# Patient Record
Sex: FEMALE | Race: OTHER
[De-identification: names, ages, dates, MRNs, and addresses within clinical notes are randomized per-mention and may not be internally consistent; named-entity substitution may affect disease eponyms.]

---

## 2017-01-12 ENCOUNTER — APPOINTMENT (OUTPATIENT)
Dept: GYNECOLOGIC ONCOLOGY | Facility: CLINIC | Age: 52
End: 2017-01-12

## 2017-07-05 ENCOUNTER — APPOINTMENT (OUTPATIENT)
Dept: GYNECOLOGIC ONCOLOGY | Facility: CLINIC | Age: 52
End: 2017-07-05

## 2017-07-05 VITALS
HEIGHT: 57 IN | BODY MASS INDEX: 29.12 KG/M2 | DIASTOLIC BLOOD PRESSURE: 70 MMHG | WEIGHT: 135 LBS | SYSTOLIC BLOOD PRESSURE: 120 MMHG

## 2017-07-06 LAB — HPV HIGH+LOW RISK DNA PNL CVX: NEGATIVE

## 2017-07-10 LAB
CORE LAB BIOPSY: NORMAL
CYTOLOGY CVX/VAG DOC THIN PREP: NORMAL

## 2017-07-12 ENCOUNTER — APPOINTMENT (OUTPATIENT)
Dept: NUCLEAR MEDICINE | Facility: IMAGING CENTER | Age: 52
End: 2017-07-12

## 2017-07-25 ENCOUNTER — OTHER (OUTPATIENT)
Age: 52
End: 2017-07-25

## 2017-08-09 ENCOUNTER — APPOINTMENT (OUTPATIENT)
Dept: CT IMAGING | Facility: IMAGING CENTER | Age: 52
End: 2017-08-09
Payer: MEDICAID

## 2017-08-09 ENCOUNTER — OUTPATIENT (OUTPATIENT)
Dept: OUTPATIENT SERVICES | Facility: HOSPITAL | Age: 52
LOS: 1 days | End: 2017-08-09
Payer: MEDICAID

## 2017-08-09 DIAGNOSIS — N93.9 ABNORMAL UTERINE AND VAGINAL BLEEDING, UNSPECIFIED: ICD-10-CM

## 2017-08-09 DIAGNOSIS — C53.9 MALIGNANT NEOPLASM OF CERVIX UTERI, UNSPECIFIED: ICD-10-CM

## 2017-08-09 PROCEDURE — 71260 CT THORAX DX C+: CPT | Mod: 26

## 2017-08-09 PROCEDURE — 74177 CT ABD & PELVIS W/CONTRAST: CPT

## 2017-08-09 PROCEDURE — 74177 CT ABD & PELVIS W/CONTRAST: CPT | Mod: 26

## 2017-08-09 PROCEDURE — 71260 CT THORAX DX C+: CPT

## 2017-12-21 ENCOUNTER — APPOINTMENT (OUTPATIENT)
Dept: RADIATION ONCOLOGY | Facility: CLINIC | Age: 52
End: 2017-12-21

## 2018-03-08 ENCOUNTER — APPOINTMENT (OUTPATIENT)
Dept: GYNECOLOGIC ONCOLOGY | Facility: CLINIC | Age: 53
End: 2018-03-08
Payer: COMMERCIAL

## 2018-03-08 VITALS
SYSTOLIC BLOOD PRESSURE: 120 MMHG | HEIGHT: 57 IN | BODY MASS INDEX: 30.85 KG/M2 | DIASTOLIC BLOOD PRESSURE: 80 MMHG | WEIGHT: 143 LBS

## 2018-03-08 DIAGNOSIS — G89.3 NEOPLASM RELATED PAIN (ACUTE) (CHRONIC): ICD-10-CM

## 2018-03-08 DIAGNOSIS — Z87.898 PERSONAL HISTORY OF OTHER SPECIFIED CONDITIONS: ICD-10-CM

## 2018-03-08 DIAGNOSIS — N93.9 ABNORMAL UTERINE AND VAGINAL BLEEDING, UNSPECIFIED: ICD-10-CM

## 2018-03-08 DIAGNOSIS — N88.8 OTHER SPECIFIED NONINFLAMMATORY DISORDERS OF CERVIX UTERI: ICD-10-CM

## 2018-03-08 PROCEDURE — 99213 OFFICE O/P EST LOW 20 MIN: CPT

## 2018-03-09 LAB — HPV HIGH+LOW RISK DNA PNL CVX: NOT DETECTED

## 2018-03-16 LAB — CYTOLOGY CVX/VAG DOC THIN PREP: NORMAL

## 2018-03-20 ENCOUNTER — CHART COPY (OUTPATIENT)
Age: 53
End: 2018-03-20

## 2018-10-11 ENCOUNTER — APPOINTMENT (OUTPATIENT)
Dept: GYNECOLOGIC ONCOLOGY | Facility: CLINIC | Age: 53
End: 2018-10-11

## 2018-12-20 ENCOUNTER — APPOINTMENT (OUTPATIENT)
Dept: GYNECOLOGIC ONCOLOGY | Facility: CLINIC | Age: 53
End: 2018-12-20
Payer: COMMERCIAL

## 2018-12-20 ENCOUNTER — OTHER (OUTPATIENT)
Age: 53
End: 2018-12-20

## 2018-12-20 VITALS
HEIGHT: 57 IN | WEIGHT: 140 LBS | SYSTOLIC BLOOD PRESSURE: 124 MMHG | BODY MASS INDEX: 30.2 KG/M2 | DIASTOLIC BLOOD PRESSURE: 80 MMHG

## 2018-12-20 PROCEDURE — 57100 BIOPSY VAGINAL MUCOSA SIMPLE: CPT

## 2018-12-20 PROCEDURE — 99213 OFFICE O/P EST LOW 20 MIN: CPT | Mod: 25

## 2018-12-21 LAB — HPV HIGH+LOW RISK DNA PNL CVX: NOT DETECTED

## 2018-12-27 ENCOUNTER — OTHER (OUTPATIENT)
Age: 53
End: 2018-12-27

## 2019-01-02 LAB
CORE LAB BIOPSY: NORMAL
CYTOLOGY CVX/VAG DOC THIN PREP: NORMAL

## 2019-01-23 ENCOUNTER — OUTPATIENT (OUTPATIENT)
Dept: OUTPATIENT SERVICES | Facility: HOSPITAL | Age: 54
LOS: 1 days | End: 2019-01-23
Payer: MEDICAID

## 2019-01-23 ENCOUNTER — APPOINTMENT (OUTPATIENT)
Dept: NUCLEAR MEDICINE | Facility: IMAGING CENTER | Age: 54
End: 2019-01-23
Payer: COMMERCIAL

## 2019-01-23 DIAGNOSIS — C53.9 MALIGNANT NEOPLASM OF CERVIX UTERI, UNSPECIFIED: ICD-10-CM

## 2019-01-23 PROCEDURE — 78815 PET IMAGE W/CT SKULL-THIGH: CPT | Mod: 26,PS

## 2019-01-23 PROCEDURE — 78815 PET IMAGE W/CT SKULL-THIGH: CPT

## 2019-01-23 PROCEDURE — A9552: CPT

## 2019-02-06 ENCOUNTER — APPOINTMENT (OUTPATIENT)
Dept: ULTRASOUND IMAGING | Facility: IMAGING CENTER | Age: 54
End: 2019-02-06
Payer: COMMERCIAL

## 2019-02-06 ENCOUNTER — OUTPATIENT (OUTPATIENT)
Dept: OUTPATIENT SERVICES | Facility: HOSPITAL | Age: 54
LOS: 1 days | End: 2019-02-06
Payer: COMMERCIAL

## 2019-02-06 ENCOUNTER — RESULT REVIEW (OUTPATIENT)
Age: 54
End: 2019-02-06

## 2019-02-06 DIAGNOSIS — R59.0 LOCALIZED ENLARGED LYMPH NODES: ICD-10-CM

## 2019-02-06 DIAGNOSIS — Z00.8 ENCOUNTER FOR OTHER GENERAL EXAMINATION: ICD-10-CM

## 2019-02-06 PROCEDURE — 88305 TISSUE EXAM BY PATHOLOGIST: CPT

## 2019-02-06 PROCEDURE — 88172 CYTP DX EVAL FNA 1ST EA SITE: CPT

## 2019-02-06 PROCEDURE — 10005 FNA BX W/US GDN 1ST LES: CPT

## 2019-02-06 PROCEDURE — 88173 CYTOPATH EVAL FNA REPORT: CPT

## 2019-02-06 PROCEDURE — 88305 TISSUE EXAM BY PATHOLOGIST: CPT | Mod: 26

## 2019-02-06 PROCEDURE — 88173 CYTOPATH EVAL FNA REPORT: CPT | Mod: 26

## 2019-02-11 LAB — NON-GYNECOLOGICAL CYTOLOGY STUDY: SIGNIFICANT CHANGE UP

## 2019-03-21 ENCOUNTER — EMERGENCY (EMERGENCY)
Facility: HOSPITAL | Age: 54
LOS: 1 days | Discharge: HOME | End: 2019-03-21
Attending: EMERGENCY MEDICINE
Payer: COMMERCIAL

## 2019-03-21 VITALS
HEIGHT: 59 IN | WEIGHT: 134.92 LBS | HEART RATE: 74 BPM | SYSTOLIC BLOOD PRESSURE: 158 MMHG | DIASTOLIC BLOOD PRESSURE: 87 MMHG | TEMPERATURE: 98 F | RESPIRATION RATE: 16 BRPM | OXYGEN SATURATION: 97 %

## 2019-03-21 LAB
APPEARANCE UR: CLEAR — SIGNIFICANT CHANGE UP
BILIRUB UR-MCNC: NEGATIVE — SIGNIFICANT CHANGE UP
COLOR SPEC: ABNORMAL
DIFF PNL FLD: ABNORMAL
GLUCOSE UR QL: NEGATIVE — SIGNIFICANT CHANGE UP
KETONES UR-MCNC: ABNORMAL
LEUKOCYTE ESTERASE UR-ACNC: ABNORMAL
NITRITE UR-MCNC: NEGATIVE — SIGNIFICANT CHANGE UP
PH UR: 7 — SIGNIFICANT CHANGE UP (ref 5–8)
PROT UR-MCNC: 500 MG/DL
SP GR SPEC: 1.01 — SIGNIFICANT CHANGE UP (ref 1.01–1.02)
UROBILINOGEN FLD QL: NEGATIVE — SIGNIFICANT CHANGE UP

## 2019-03-21 PROCEDURE — 87086 URINE CULTURE/COLONY COUNT: CPT

## 2019-03-21 PROCEDURE — 99283 EMERGENCY DEPT VISIT LOW MDM: CPT | Mod: 25

## 2019-03-21 PROCEDURE — 81001 URINALYSIS AUTO W/SCOPE: CPT

## 2019-03-21 PROCEDURE — 99283 EMERGENCY DEPT VISIT LOW MDM: CPT

## 2019-03-21 NOTE — ED ADULT NURSE NOTE - OBJECTIVE STATEMENT
pt is here for bloody urine.  pt stated that bloody urine for one month, on and off, denied pain or sob, denied N/V/D, pt calm at this time.

## 2019-03-21 NOTE — ED ADULT NURSE NOTE - NSIMPLEMENTINTERV_GEN_ALL_ED
Implemented All Universal Safety Interventions:  [place] to call system. Call bell, personal items and telephone within reach. Instruct patient to call for assistance. Room bathroom lighting operational. Non-slip footwear when patient is off stretcher. Physically safe environment: no spills, clutter or unnecessary equipment. Stretcher in lowest position, wheels locked, appropriate side rails in place.

## 2019-03-21 NOTE — ED PROVIDER NOTE - CLINICAL SUMMARY MEDICAL DECISION MAKING FREE TEXT BOX
53 year old female with intermittent hematuria. vitals WNL. PE as above.  ua with +blood but no UTI. will dc home with urology f/u- advised she will likely need cystoscopy for r/o mass/polyp/etc and to determine cause of hematuria. also possibly radiation cystitis? return precautions given.

## 2019-03-21 NOTE — ED PROVIDER NOTE - TEMPLATE, MLM
Patient cancelled/no showed appointment on 1/29/19 at 11am due to patient's mom states that the patient does not need appointment.      This appointment was: . Dr. Solis, would you like to cancel or no show this appointment? Appointment is pending.    Message routed as Routine priority   Message routed to the Provider and the PSR Pool for the site Provider is working at today    Close message on routing unless Provider input is needed       General

## 2019-03-22 ENCOUNTER — APPOINTMENT (OUTPATIENT)
Dept: RADIATION ONCOLOGY | Facility: CLINIC | Age: 54
End: 2019-03-22

## 2019-03-22 ENCOUNTER — APPOINTMENT (OUTPATIENT)
Dept: UROLOGY | Facility: CLINIC | Age: 54
End: 2019-03-22
Payer: COMMERCIAL

## 2019-03-22 VITALS
OXYGEN SATURATION: 98 % | DIASTOLIC BLOOD PRESSURE: 79 MMHG | HEART RATE: 77 BPM | SYSTOLIC BLOOD PRESSURE: 120 MMHG | TEMPERATURE: 98.4 F

## 2019-03-22 PROCEDURE — 99204 OFFICE O/P NEW MOD 45 MIN: CPT

## 2019-03-22 NOTE — REVIEW OF SYSTEMS
[Painful [place]] : painful [place] [Pain during urination] : pain during urination [Blood in urine that you can see] : blood visible in urine [Discharge from urine canal] : discharge from urine canal [Strong urge to urinate] : strong urge to urinate [Leakage of urine with urgency] : leakage of urine with urgency [Unaware of when urine is leaking] : unaware of when urine is leaking [Negative] : Heme/Lymph

## 2019-03-23 LAB
CULTURE RESULTS: NO GROWTH — SIGNIFICANT CHANGE UP
SPECIMEN SOURCE: SIGNIFICANT CHANGE UP

## 2019-03-24 NOTE — HISTORY OF PRESENT ILLNESS
[FreeTextEntry1] : 52 yo Citizen of [place] speaking F with history of cervical cancer s/p chemoradiation\par Has been having gross hematuria intermittently since January\par Yesterday had gross hematuria with clots and dysuria\par Went to ER and was told to follow-up with urology\par Urine was clear today\par No fever, chills\par Pt also pending follow-up with rad onc later today\par Of note, pt had cysto in 2014 which showed evidence of radiation cystitis\par

## 2019-03-24 NOTE — ASSESSMENT
[FreeTextEntry1] : 54 yo F with gross hematuria\par \par - Reviewed PET CT done in January. Unfortunately, no urogram\par - CT urogram\par - UA, culture, cytology\par - Schedule cystoscopy for Mon\par - Start Bactrim today in case UTI and in anticipation of cysto on Mon

## 2019-03-24 NOTE — PHYSICAL EXAM

## 2019-03-25 ENCOUNTER — APPOINTMENT (OUTPATIENT)
Age: 54
End: 2019-03-25
Payer: COMMERCIAL

## 2019-03-25 PROBLEM — C53.9 MALIGNANT NEOPLASM OF CERVIX UTERI, UNSPECIFIED: Chronic | Status: ACTIVE | Noted: 2019-03-21

## 2019-03-25 LAB
APPEARANCE: CLEAR
BACTERIA UR CULT: NORMAL
BACTERIA: NEGATIVE
BILIRUBIN URINE: NEGATIVE
BLOOD URINE: ABNORMAL
COLOR: YELLOW
GLUCOSE QUALITATIVE U: NEGATIVE
HYALINE CASTS: 0 /LPF
KETONES URINE: NEGATIVE
LEUKOCYTE ESTERASE URINE: NEGATIVE
MICROSCOPIC-UA: NORMAL
NITRITE URINE: NEGATIVE
PH URINE: 6
PROTEIN URINE: NORMAL
RED BLOOD CELLS URINE: 3 /HPF
SPECIFIC GRAVITY URINE: 1.02
SQUAMOUS EPITHELIAL CELLS: 0 /HPF
URINE CYTOLOGY: NORMAL
UROBILINOGEN URINE: NORMAL
WHITE BLOOD CELLS URINE: 2 /HPF

## 2019-03-25 PROCEDURE — 52000 CYSTOURETHROSCOPY: CPT

## 2019-03-25 NOTE — HISTORY OF PRESENT ILLNESS
[FreeTextEntry1] : Shawanda Marrero is a 53 year old female with stage IIB cervical SCC s/p concurrent chemotherapy and EBRT/vaginal brachytherapy  completed 12/2013. She was on the OUtback trial and randomized to received additional systemic cytotoxic chemotherapy, completing March 2014. She then underwent TANIA/BSO in [facility] early 2015. She has continued to follow up with med onc and gyn onc. Was last seen in our office in April 2015.\par \par Recent imaging on 1/23/19 PET/CT noted a left retroclavicular LN unchanged in size and demonstrating FDG activity (SUV 3.7), biopsy recommended and completed on 2/6/19. Pathology was negative.

## 2019-03-27 ENCOUNTER — OUTPATIENT (OUTPATIENT)
Dept: OUTPATIENT SERVICES | Facility: HOSPITAL | Age: 54
LOS: 1 days | End: 2019-03-27
Payer: COMMERCIAL

## 2019-03-27 ENCOUNTER — APPOINTMENT (OUTPATIENT)
Dept: CT IMAGING | Facility: CLINIC | Age: 54
End: 2019-03-27
Payer: COMMERCIAL

## 2019-03-27 DIAGNOSIS — Z00.8 ENCOUNTER FOR OTHER GENERAL EXAMINATION: ICD-10-CM

## 2019-03-27 PROCEDURE — 74178 CT ABD&PLV WO CNTR FLWD CNTR: CPT

## 2019-03-27 PROCEDURE — 74178 CT ABD&PLV WO CNTR FLWD CNTR: CPT | Mod: 26

## 2019-04-25 ENCOUNTER — APPOINTMENT (OUTPATIENT)
Age: 54
End: 2019-04-25
Payer: COMMERCIAL

## 2019-04-25 VITALS
BODY MASS INDEX: 30.2 KG/M2 | DIASTOLIC BLOOD PRESSURE: 82 MMHG | HEART RATE: 70 BPM | SYSTOLIC BLOOD PRESSURE: 124 MMHG | WEIGHT: 140 LBS | HEIGHT: 57 IN

## 2019-04-25 PROCEDURE — 99213 OFFICE O/P EST LOW 20 MIN: CPT

## 2019-04-25 NOTE — PHYSICAL EXAM
[General Appearance - Well Developed] : well developed [General Appearance - Well Nourished] : well nourished [Normal Appearance] : normal appearance [Well Groomed] : well groomed [General Appearance - In No Acute Distress] : no acute distress [Abdomen Soft] : soft [Abdomen Tenderness] : non-tender [Costovertebral Angle Tenderness] : no ~M costovertebral angle tenderness [Urethral Meatus] : normal urethra [Urinary Bladder Findings] : the bladder was normal on palpation [External Female Genitalia] : normal external genitalia [FreeTextEntry1] : deferred today [Edema] : no peripheral edema [] : no respiratory distress [Respiration, Rhythm And Depth] : normal respiratory rhythm and effort [Exaggerated Use Of Accessory Muscles For Inspiration] : no accessory muscle use [Oriented To Time, Place, And Person] : oriented to person, place, and time [Affect] : the affect was normal [Mood] : the mood was normal [Not Anxious] : not anxious [Normal Station and Gait] : the gait and station were normal for the patient's age [No Focal Deficits] : no focal deficits [No Palpable Adenopathy] : no palpable adenopathy

## 2019-04-25 NOTE — HISTORY OF PRESENT ILLNESS
[FreeTextEntry1] : 52 yo Nicaraguan speaking F with history of cervical cancer s/p chemoradiation\par Has been having gross hematuria intermittently since January\par Yesterday had gross hematuria with clots and dysuria\par Went to ER and was told to follow-up with urology\par Urine was clear today\par No fever, chills\par Pt also pending follow-up with rad onc later today\par Of note, pt had cysto in 2014 which showed evidence of radiation cystitis\par \par Interval history: no recurrences of hematuria since last visit\par Feels like elmiron has been making her more fatigued\par \par

## 2019-04-25 NOTE — ASSESSMENT
[FreeTextEntry1] : 52 yo F with radiation cystitis\par \par - Can try stopping elmiron\par - Should hematuria recur, discussed importance of immediate medical attention\par - FU in 3 months

## 2019-06-20 ENCOUNTER — APPOINTMENT (OUTPATIENT)
Dept: GYNECOLOGIC ONCOLOGY | Facility: CLINIC | Age: 54
End: 2019-06-20
Payer: COMMERCIAL

## 2019-06-20 VITALS
DIASTOLIC BLOOD PRESSURE: 83 MMHG | BODY MASS INDEX: 33.44 KG/M2 | HEIGHT: 57 IN | SYSTOLIC BLOOD PRESSURE: 135 MMHG | HEART RATE: 75 BPM | WEIGHT: 155 LBS

## 2019-06-20 PROCEDURE — 99213 OFFICE O/P EST LOW 20 MIN: CPT | Mod: 25

## 2019-06-20 PROCEDURE — 57100 BIOPSY VAGINAL MUCOSA SIMPLE: CPT

## 2019-06-24 LAB — HPV HIGH+LOW RISK DNA PNL CVX: NOT DETECTED

## 2019-06-25 LAB — CORE LAB BIOPSY: NORMAL

## 2019-06-30 LAB — CYTOLOGY CVX/VAG DOC THIN PREP: NORMAL

## 2019-07-25 ENCOUNTER — APPOINTMENT (OUTPATIENT)
Age: 54
End: 2019-07-25
Payer: COMMERCIAL

## 2019-07-25 VITALS
WEIGHT: 150 LBS | TEMPERATURE: 97.9 F | HEIGHT: 57 IN | SYSTOLIC BLOOD PRESSURE: 125 MMHG | BODY MASS INDEX: 32.36 KG/M2 | HEART RATE: 77 BPM | DIASTOLIC BLOOD PRESSURE: 84 MMHG

## 2019-07-25 PROCEDURE — 99213 OFFICE O/P EST LOW 20 MIN: CPT

## 2019-07-25 RX ORDER — PENTOSAN POLYSULFATE SODIUM 100 MG/1
100 CAPSULE, GELATIN COATED ORAL 3 TIMES DAILY
Qty: 90 | Refills: 3 | Status: COMPLETED | COMMUNITY
Start: 2019-03-25 | End: 2019-07-25

## 2019-07-25 RX ORDER — SULFAMETHOXAZOLE AND TRIMETHOPRIM 400; 80 MG/1; MG/1
400-80 TABLET ORAL TWICE DAILY
Qty: 14 | Refills: 0 | Status: COMPLETED | COMMUNITY
Start: 2019-03-22 | End: 2019-07-25

## 2019-08-04 NOTE — ASSESSMENT
[FreeTextEntry1] : 55 yo F with history of radiation cystitis. Doing well\par \par - Can continue to hold elmiron\par - FU in 6 months

## 2019-08-04 NOTE — HISTORY OF PRESENT ILLNESS
[FreeTextEntry1] : 54 yo [place] Leonean speaking F with history of cervical cancer s/p chemoradiation\par Has been having gross hematuria intermittently since January\par Yesterday had gross hematuria with clots and dysuria\par Went to ER and was told to follow-up with urology\par Urine was clear today\par No fever, chills\par Pt also pending follow-up with rad onc later today\par Of note, pt had cysto in 2014 which showed evidence of radiation cystitis\par \par Interval history: no recurrences of hematuria since last visit\par Feels like elmiron has been making her more fatigued\par \par Interval history; feels like she gained weight and feeling bloated\par Stopped elmiron and no more recurrences of hematuria\par \par

## 2019-12-26 ENCOUNTER — APPOINTMENT (OUTPATIENT)
Dept: GYNECOLOGIC ONCOLOGY | Facility: CLINIC | Age: 54
End: 2019-12-26
Payer: COMMERCIAL

## 2019-12-26 VITALS
DIASTOLIC BLOOD PRESSURE: 85 MMHG | HEIGHT: 57 IN | SYSTOLIC BLOOD PRESSURE: 129 MMHG | WEIGHT: 149 LBS | BODY MASS INDEX: 32.15 KG/M2

## 2019-12-26 PROCEDURE — 99213 OFFICE O/P EST LOW 20 MIN: CPT

## 2019-12-27 LAB — HPV HIGH+LOW RISK DNA PNL CVX: NOT DETECTED

## 2020-01-07 ENCOUNTER — CHART COPY (OUTPATIENT)
Age: 55
End: 2020-01-07

## 2020-01-07 LAB — CYTOLOGY CVX/VAG DOC THIN PREP: ABNORMAL

## 2020-01-27 ENCOUNTER — APPOINTMENT (OUTPATIENT)
Dept: UROLOGY | Facility: CLINIC | Age: 55
End: 2020-01-27
Payer: COMMERCIAL

## 2020-01-27 VITALS
HEIGHT: 57 IN | SYSTOLIC BLOOD PRESSURE: 122 MMHG | DIASTOLIC BLOOD PRESSURE: 81 MMHG | WEIGHT: 152 LBS | BODY MASS INDEX: 32.79 KG/M2

## 2020-01-27 PROCEDURE — 99213 OFFICE O/P EST LOW 20 MIN: CPT

## 2020-01-27 NOTE — ASSESSMENT
[FreeTextEntry1] : 53 yo F with radiation cystitis. Stable\par \par - Continue observation\par - Reaffirmed behavioral modifications\par - FU in 6 months

## 2020-01-27 NOTE — HISTORY OF PRESENT ILLNESS
[FreeTextEntry1] : 52 yo Micronesian speaking F with history of cervical cancer s/p chemoradiation\par Has been having gross hematuria intermittently since January\par Yesterday had gross hematuria with clots and dysuria\par Went to ER and was told to follow-up with urology\par Urine was clear today\par No fever, chills\par Pt also pending follow-up with rad onc later today\par Of note, pt had cysto in 2014 which showed evidence of radiation cystitis\par \par Interval history: no recurrences of hematuria since last visit\par Feels like elmiron has been making her more fatigued\par \par 7/25/19 Interval history; feels like she gained weight and feeling bloated\par Stopped elmiron and no more recurrences of hematuria\par \par 1/27/20 Interval history: In August had some hematuria when travelling in [place] after she lifted something heavy\par Lasted for only one day\par Took two doses of elmiron when it occurred and no recurrences since\par no pain\par

## 2020-06-11 ENCOUNTER — APPOINTMENT (OUTPATIENT)
Dept: GYNECOLOGIC ONCOLOGY | Facility: CLINIC | Age: 55
End: 2020-06-11
Payer: COMMERCIAL

## 2020-06-11 VITALS
SYSTOLIC BLOOD PRESSURE: 146 MMHG | WEIGHT: 154 LBS | DIASTOLIC BLOOD PRESSURE: 86 MMHG | HEART RATE: 78 BPM | BODY MASS INDEX: 33.33 KG/M2

## 2020-06-11 PROCEDURE — 99213 OFFICE O/P EST LOW 20 MIN: CPT

## 2020-06-12 LAB — HPV HIGH+LOW RISK DNA PNL CVX: NOT DETECTED

## 2020-06-16 LAB — CYTOLOGY CVX/VAG DOC THIN PREP: ABNORMAL

## 2020-07-27 ENCOUNTER — APPOINTMENT (OUTPATIENT)
Age: 55
End: 2020-07-27

## 2021-06-17 ENCOUNTER — APPOINTMENT (OUTPATIENT)
Dept: GYNECOLOGIC ONCOLOGY | Facility: CLINIC | Age: 56
End: 2021-06-17
Payer: COMMERCIAL

## 2021-06-17 VITALS
SYSTOLIC BLOOD PRESSURE: 147 MMHG | DIASTOLIC BLOOD PRESSURE: 90 MMHG | HEIGHT: 57 IN | BODY MASS INDEX: 33.44 KG/M2 | HEART RATE: 69 BPM | WEIGHT: 155 LBS

## 2021-06-17 PROCEDURE — 99213 OFFICE O/P EST LOW 20 MIN: CPT | Mod: 25

## 2021-06-17 PROCEDURE — 57100 BIOPSY VAGINAL MUCOSA SIMPLE: CPT

## 2021-06-19 LAB — HPV HIGH+LOW RISK DNA PNL CVX: NOT DETECTED

## 2021-06-22 LAB — CYTOLOGY CVX/VAG DOC THIN PREP: ABNORMAL

## 2021-06-30 LAB — CORE LAB BIOPSY: NORMAL

## 2021-07-01 ENCOUNTER — NON-APPOINTMENT (OUTPATIENT)
Age: 56
End: 2021-07-01

## 2021-12-16 ENCOUNTER — APPOINTMENT (OUTPATIENT)
Dept: GYNECOLOGIC ONCOLOGY | Facility: CLINIC | Age: 56
End: 2021-12-16
Payer: COMMERCIAL

## 2021-12-16 VITALS
BODY MASS INDEX: 33.22 KG/M2 | WEIGHT: 154 LBS | HEART RATE: 86 BPM | SYSTOLIC BLOOD PRESSURE: 130 MMHG | DIASTOLIC BLOOD PRESSURE: 86 MMHG | HEIGHT: 57 IN

## 2021-12-16 DIAGNOSIS — Z85.41 ENCOUNTER FOR FOLLOW-UP EXAMINATION AFTER COMPLETED TREATMENT FOR MALIGNANT NEOPLASM: ICD-10-CM

## 2021-12-16 DIAGNOSIS — Z08 ENCOUNTER FOR FOLLOW-UP EXAMINATION AFTER COMPLETED TREATMENT FOR MALIGNANT NEOPLASM: ICD-10-CM

## 2021-12-16 PROCEDURE — 99213 OFFICE O/P EST LOW 20 MIN: CPT

## 2021-12-21 LAB — HPV HIGH+LOW RISK DNA PNL CVX: NOT DETECTED

## 2021-12-22 ENCOUNTER — NON-APPOINTMENT (OUTPATIENT)
Age: 56
End: 2021-12-22

## 2021-12-22 LAB — CYTOLOGY CVX/VAG DOC THIN PREP: ABNORMAL

## 2022-06-22 RX ORDER — ICOSAPENT ETHYL 1 G/1
1 CAPSULE ORAL
Qty: 360 | Refills: 0 | Status: ACTIVE | COMMUNITY
Start: 2022-03-09

## 2022-06-22 RX ORDER — ATORVASTATIN CALCIUM 40 MG/1
40 TABLET, FILM COATED ORAL
Qty: 90 | Refills: 0 | Status: ACTIVE | COMMUNITY
Start: 2022-06-02

## 2022-06-22 RX ORDER — ERGOCALCIFEROL 1.25 MG/1
1.25 MG CAPSULE, LIQUID FILLED ORAL
Qty: 12 | Refills: 0 | Status: ACTIVE | COMMUNITY
Start: 2022-03-03

## 2022-07-14 ENCOUNTER — APPOINTMENT (OUTPATIENT)
Dept: GYNECOLOGIC ONCOLOGY | Facility: CLINIC | Age: 57
End: 2022-07-14

## 2022-07-14 VITALS
SYSTOLIC BLOOD PRESSURE: 139 MMHG | DIASTOLIC BLOOD PRESSURE: 89 MMHG | WEIGHT: 154 LBS | HEART RATE: 67 BPM | BODY MASS INDEX: 33.22 KG/M2 | HEIGHT: 57 IN

## 2022-07-14 PROCEDURE — 99213 OFFICE O/P EST LOW 20 MIN: CPT

## 2022-07-15 LAB — HPV HIGH+LOW RISK DNA PNL CVX: NOT DETECTED

## 2022-07-22 ENCOUNTER — NON-APPOINTMENT (OUTPATIENT)
Age: 57
End: 2022-07-22

## 2022-07-22 LAB — CYTOLOGY CVX/VAG DOC THIN PREP: ABNORMAL

## 2022-08-25 ENCOUNTER — APPOINTMENT (OUTPATIENT)
Dept: UROLOGY | Facility: CLINIC | Age: 57
End: 2022-08-25

## 2022-09-26 ENCOUNTER — APPOINTMENT (OUTPATIENT)
Age: 57
End: 2022-09-26

## 2022-09-26 VITALS
OXYGEN SATURATION: 99 % | TEMPERATURE: 98.1 F | HEART RATE: 70 BPM | BODY MASS INDEX: 33.22 KG/M2 | WEIGHT: 154 LBS | DIASTOLIC BLOOD PRESSURE: 83 MMHG | HEIGHT: 57 IN | SYSTOLIC BLOOD PRESSURE: 128 MMHG | RESPIRATION RATE: 15 BRPM

## 2022-09-26 DIAGNOSIS — Z78.9 OTHER SPECIFIED HEALTH STATUS: ICD-10-CM

## 2022-09-26 PROCEDURE — 99214 OFFICE O/P EST MOD 30 MIN: CPT

## 2022-09-27 NOTE — ASSESSMENT
[FreeTextEntry1] : 58 yo F with history of radiation cystitis presents with intermittent gross hematuria\par \par - UA, culture, cytology\par - last cysto was in 2019. Discussed pros and cons of repeating cysto at this time. Will schedule in the near future

## 2022-09-27 NOTE — PHYSICAL EXAM
[General Appearance - Well Developed] : well developed [General Appearance - Well Nourished] : well nourished [Normal Appearance] : normal appearance [Well Groomed] : well groomed [General Appearance - In No Acute Distress] : no acute distress [Edema] : no peripheral edema [Respiration, Rhythm And Depth] : normal respiratory rhythm and effort [Exaggerated Use Of Accessory Muscles For Inspiration] : no accessory muscle use [Abdomen Soft] : soft [Abdomen Tenderness] : non-tender [Costovertebral Angle Tenderness] : no ~M costovertebral angle tenderness [Urethral Meatus] : normal urethra [Urinary Bladder Findings] : the bladder was normal on palpation [External Female Genitalia] : normal external genitalia [Normal Station and Gait] : the gait and station were normal for the patient's age [] : no rash [No Focal Deficits] : no focal deficits [Oriented To Time, Place, And Person] : oriented to person, place, and time [Affect] : the affect was normal [Mood] : the mood was normal [Not Anxious] : not anxious [No Palpable Adenopathy] : no palpable adenopathy [FreeTextEntry1] : deferred today

## 2022-09-27 NOTE — REVIEW OF SYSTEMS
[Told you have blood in urine on a urine test] : told blood was present in a urine test [Negative] : Heme/Lymph [FreeTextEntry6] : some discomfort on the vaginal area / bladder

## 2022-09-27 NOTE — HISTORY OF PRESENT ILLNESS
[FreeTextEntry1] : 54 yo Malagasy speaking F with history of cervical cancer s/p chemoradiation\par Has been having gross hematuria intermittently since January\par Yesterday had gross hematuria with clots and dysuria\par Went to ER and was told to follow-up with urology\par Urine was clear today\par No fever, chills\par Pt also pending follow-up with rad onc later today\par Of note, pt had cysto in 2014 which showed evidence of radiation cystitis\par \par Interval history: no recurrences of hematuria since last visit\par Feels like elmiron has been making her more fatigued\par \par 7/25/19 Interval history; feels like she gained weight and feeling bloated\par Stopped elmiron and no more recurrences of hematuria\par \par 1/27/20 Interval history: In August had some hematuria when travelling in [place] after she lifted something heavy\par Lasted for only one day\par Took two doses of elmiron when it occurred and no recurrences since\par no pain\par \par 9/26/22 Interval history: Last seen in Jan 2020\par Was doing well with no hematuria until this past June\par Since then, has been having intermittent hematuria\par some lower pelvic pressure\par no dysuria\par Also with recent Diarrhea issues and hematochezia

## 2022-09-28 LAB
APPEARANCE: CLEAR
BACTERIA UR CULT: NORMAL
BACTERIA: NEGATIVE
BILIRUBIN URINE: NEGATIVE
BLOOD URINE: NEGATIVE
COLOR: NORMAL
GLUCOSE QUALITATIVE U: NEGATIVE
HYALINE CASTS: 1 /LPF
KETONES URINE: NEGATIVE
LEUKOCYTE ESTERASE URINE: ABNORMAL
MICROSCOPIC-UA: NORMAL
NITRITE URINE: NEGATIVE
PH URINE: 6.5
PROTEIN URINE: NEGATIVE
RED BLOOD CELLS URINE: 1 /HPF
SPECIFIC GRAVITY URINE: 1.01
SQUAMOUS EPITHELIAL CELLS: 0 /HPF
URINE CYTOLOGY: NORMAL
UROBILINOGEN URINE: NORMAL
WHITE BLOOD CELLS URINE: 0 /HPF

## 2022-10-31 ENCOUNTER — APPOINTMENT (OUTPATIENT)
Dept: UROLOGY | Facility: CLINIC | Age: 57
End: 2022-10-31

## 2022-11-14 ENCOUNTER — APPOINTMENT (OUTPATIENT)
Age: 57
End: 2022-11-14

## 2023-01-12 ENCOUNTER — APPOINTMENT (OUTPATIENT)
Dept: GYNECOLOGIC ONCOLOGY | Facility: CLINIC | Age: 58
End: 2023-01-12
Payer: COMMERCIAL

## 2023-01-12 VITALS
BODY MASS INDEX: 33.22 KG/M2 | WEIGHT: 154 LBS | SYSTOLIC BLOOD PRESSURE: 151 MMHG | HEART RATE: 64 BPM | DIASTOLIC BLOOD PRESSURE: 89 MMHG | HEIGHT: 57 IN

## 2023-01-12 PROCEDURE — 99213 OFFICE O/P EST LOW 20 MIN: CPT

## 2023-01-19 ENCOUNTER — NON-APPOINTMENT (OUTPATIENT)
Age: 58
End: 2023-01-19

## 2023-01-19 LAB
CYTOLOGY CVX/VAG DOC THIN PREP: ABNORMAL
HPV HIGH+LOW RISK DNA PNL CVX: NOT DETECTED

## 2023-01-19 NOTE — HISTORY OF PRESENT ILLNESS
[FreeTextEntry1] : Ms. Marrero has a h/o stage IIB cervical SCC (with bilateral pelvic node involvement on imaging at diagnosis)  and is a former patient of Dr. Larson. She was treated on the Outback trial and completed concurrent chemotherapy and pelvic RT/vaginal brachytherapy completed in 12/2013. She was randomized to receive additional systemic cytotoxic chemotherapy and completed this with Dr. Mandujano in 3/2014. \par Subsequently, she underwent TANIA/BSO in [facility] in early 2015 due to bleeding, and per notes, there was no residual disease on the specimen. \par \par SHe has been ELVIS since that time. She feels well and denies pelvic pain or any other associated signs or symptoms. \par She has spotting after intercourse which is longstanding and resolves same day; occasional scant bleeding with wiping after urination. \par She does not use a dilator.\par She is followed by Urology, Dr. Sierra - CT identified a bladder lesion, she had office cystoscopy; radiation cystitis; she denies any hematuria since summer 2022. \par \par IMAGING:\par PET/CT 3/2016 - no recurrence. Decreased size and metabolism of small cervical nodes. \par CT C/A/P 8/9/17: ELVIS (questionable mild posterior bladder wall thickening and post-tx changes of sigmoid colon.) PET was not approved. \par 1/23/19 PET/CT noted a left retroclavicular LN unchanged in size and demonstrating FDG activity (SUV 3.7), biopsy 2/6/19 was negative.\par 3/27/19 CT A/P: small b/l renal angiomyolipomas; 6mm bladder polypoid lesion with air bubbles in bladder.\par \par HM:\par 12/26/19: PAP negative/HRHPV (-)\par 6/2019 PAP negative/HRHPV (-)/ vaginal biopsy benign.\par 12/20/18 PAP negative; HRHPV (-); vaginal biopsy c/w post-RT changes.\par 3/2018 PAP (-)/HRHPV (-)\par 6/12/2020:  PAP:  Negative for intraepithelial lesion or malignancy.  Negative HPV\par 6/17/21:  Vagina, Right:  Dilated and anastomosing vascular spaces in the subepithelial tissue, with hemorrhage and fibrin.  Negative for malignancy.  \par 6/17/21:  Negative for intraepithelial lesion or malignancy.  Negative HPV\par 12/16/21: vag PAP negative/HPV (-)\par 7/2022: vag PAP negative/HPV (-)\par \par Mammo: outside facility 12/2021 per pt\par Colonoscopy: 10/2015 radiation proctitis\par \par PCP: Dr. Porter Camacho, Dr. Aletha Zamora in same office\par Med Onc: Dr. Mandujano\par Rad Onc: Dr. Resendiz\par Urologist: Dr. Rosa Sierra

## 2023-01-19 NOTE — PHYSICAL EXAM
[Chaperone Present] : A chaperone was present in the examining room during all aspects of the physical examination [Absent] : Adnexa(ae): Absent [Normal] : Recto-Vaginal Exam: Normal [Fully active, able to carry on all pre-disease performance without restriction] : Status 0 - Fully active, able to carry on all pre-disease performance without restriction [FreeTextEntry1] : Ara [de-identified] : vagina is shortened and stenotic c/w treatment effect, no lesions [de-identified] : no mass [de-identified] : confirmatory

## 2023-08-24 ENCOUNTER — APPOINTMENT (OUTPATIENT)
Dept: GYNECOLOGIC ONCOLOGY | Facility: CLINIC | Age: 58
End: 2023-08-24
Payer: COMMERCIAL

## 2023-08-24 VITALS
DIASTOLIC BLOOD PRESSURE: 82 MMHG | HEIGHT: 57 IN | BODY MASS INDEX: 33.01 KG/M2 | SYSTOLIC BLOOD PRESSURE: 144 MMHG | WEIGHT: 153 LBS | HEART RATE: 67 BPM

## 2023-08-24 PROCEDURE — 99213 OFFICE O/P EST LOW 20 MIN: CPT

## 2023-08-24 NOTE — REASON FOR VISIT
[FreeTextEntry1] : surveillance; IIB cervical cancer s/p chemoradiation and subsequent TANIA/BSO 2015

## 2023-08-24 NOTE — DISCUSSION/SUMMARY
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 - f/u UA, C&S  - Continued Urology followup is advised, for intermittent hematuria.- we gave her Dr. Sierra's number. -  reviewed - Risk, signs and symptoms of recurrence were reviewed.  -  All questions answered. She was again advised to see me on a 6 month alternating basis with Dr. Resendiz for continued surveillance.

## 2023-08-24 NOTE — ASSESSMENT
[FreeTextEntry1] : 59 y/o with stage IIB cervical SCC, s/p chemoradiation and hysterectomy/BSO, clinically without evidence of disease.

## 2023-08-24 NOTE — PHYSICAL EXAM
[Chaperone Present] : A chaperone was present in the examining room during all aspects of the physical examination [Absent] : Adnexa(ae): Absent [Normal] : Recto-Vaginal Exam: Normal [Fully active, able to carry on all pre-disease performance without restriction] : Status 0 - Fully active, able to carry on all pre-disease performance without restriction [FreeTextEntry1] : Ara [de-identified] : vagina is shortened and stenotic c/w treatment effect, no lesions [de-identified] : no mass [de-identified] : confirmatory

## 2023-08-24 NOTE — HISTORY OF PRESENT ILLNESS
[FreeTextEntry1] : Ms. Marrero has a h/o stage IIB cervical SCC (with bilateral pelvic node involvement on imaging at diagnosis)  and is a former patient of Dr. Larson. She was treated on the Outback trial and completed concurrent chemotherapy and pelvic RT/vaginal brachytherapy completed in 12/2013. She was randomized to receive additional systemic cytotoxic chemotherapy and completed this with Dr. Mandujano in 3/2014.  Subsequently, she underwent TANIA/BSO in [facility] in early 2015 due to bleeding, and per notes, there was no residual disease on the specimen.   SHe has been ELVIS since that time. She feels well and denies pelvic pain or any other associated signs or symptoms.  She has spotting after intercourse which is longstanding and resolves same day; occasional scant bleeding with wiping after urination.  She does not use a dilator. She is followed by Urology, Dr. Sierra - CT identified a bladder lesion, she had office cystoscopy; radiation cystitis; she denies any hematuria since summer 2022.   IMAGING: PET/CT 3/2016 - no recurrence. Decreased size and metabolism of small cervical nodes.  CT C/A/P 8/9/17: ELVIS (questionable mild posterior bladder wall thickening and post-tx changes of sigmoid colon.) PET was not approved.  1/23/19 PET/CT noted a left retroclavicular LN unchanged in size and demonstrating FDG activity (SUV 3.7), biopsy 2/6/19 was negative. 3/27/19 CT A/P: small b/l renal angiomyolipomas; 6mm bladder polypoid lesion with air bubbles in bladder.  HM: 12/26/19: PAP negative/HRHPV (-) 6/2019 PAP negative/HRHPV (-)/ vaginal biopsy benign. 12/20/18 PAP negative; HRHPV (-); vaginal biopsy c/w post-RT changes. 3/2018 PAP (-)/HRHPV (-) 6/12/2020:  PAP:  Negative for intraepithelial lesion or malignancy.  Negative HPV 6/17/21:  Vagina, Right:  Dilated and anastomosing vascular spaces in the subepithelial tissue, with hemorrhage and fibrin.  Negative for malignancy.   6/17/21:  Negative for intraepithelial lesion or malignancy.  Negative HPV 12/16/21: vag PAP negative/HPV (-) 7/2022: vag PAP negative/HPV (-) 1/2023: VAG PAP negative/ HRHPV (-)  Mammo: up to date per pt Colonoscopy: 10/2015 radiation proctitis  PCP: Dr. Porter Camacho, Dr. Aletha Zamora in same office Med Onc: Dr. Delbert [place] Onc: Dr. Resendiz Urologist: Dr. Rosa Sierra

## 2023-10-13 LAB
APPEARANCE: CLEAR
BACTERIA UR CULT: NORMAL
BACTERIA: NEGATIVE /HPF
BILIRUBIN URINE: NEGATIVE
BLOOD URINE: NEGATIVE
CAST: 0 /LPF
COLOR: YELLOW
CYTOLOGY CVX/VAG DOC THIN PREP: ABNORMAL
EPITHELIAL CELLS: 0 /HPF
GLUCOSE QUALITATIVE U: NEGATIVE MG/DL
HPV HIGH+LOW RISK DNA PNL CVX: NOT DETECTED
KETONES URINE: NEGATIVE MG/DL
LEUKOCYTE ESTERASE URINE: NEGATIVE
MICROSCOPIC-UA: NORMAL
NITRITE URINE: NEGATIVE
PH URINE: 6
PROTEIN URINE: NEGATIVE MG/DL
RED BLOOD CELLS URINE: 0 /HPF
SPECIFIC GRAVITY URINE: 1.01
UROBILINOGEN URINE: 0.2 MG/DL
WHITE BLOOD CELLS URINE: 0 /HPF

## 2024-02-15 ENCOUNTER — APPOINTMENT (OUTPATIENT)
Dept: GYNECOLOGIC ONCOLOGY | Facility: CLINIC | Age: 59
End: 2024-02-15
Payer: COMMERCIAL

## 2024-02-15 ENCOUNTER — RESULT REVIEW (OUTPATIENT)
Age: 59
End: 2024-02-15

## 2024-02-15 VITALS
BODY MASS INDEX: 32.15 KG/M2 | DIASTOLIC BLOOD PRESSURE: 85 MMHG | SYSTOLIC BLOOD PRESSURE: 130 MMHG | HEART RATE: 74 BPM | HEIGHT: 57 IN | WEIGHT: 149 LBS

## 2024-02-15 DIAGNOSIS — C53.9 MALIGNANT NEOPLASM OF CERVIX UTERI, UNSPECIFIED: ICD-10-CM

## 2024-02-15 PROCEDURE — 99213 OFFICE O/P EST LOW 20 MIN: CPT

## 2024-02-24 ENCOUNTER — APPOINTMENT (OUTPATIENT)
Dept: CT IMAGING | Facility: IMAGING CENTER | Age: 59
End: 2024-02-24
Payer: SELF-PAY

## 2024-02-24 ENCOUNTER — OUTPATIENT (OUTPATIENT)
Dept: OUTPATIENT SERVICES | Facility: HOSPITAL | Age: 59
LOS: 1 days | End: 2024-02-24
Payer: COMMERCIAL

## 2024-02-24 DIAGNOSIS — C53.9 MALIGNANT NEOPLASM OF CERVIX UTERI, UNSPECIFIED: ICD-10-CM

## 2024-02-24 DIAGNOSIS — Z00.8 ENCOUNTER FOR OTHER GENERAL EXAMINATION: ICD-10-CM

## 2024-02-24 PROCEDURE — 74177 CT ABD & PELVIS W/CONTRAST: CPT

## 2024-02-24 PROCEDURE — 71260 CT THORAX DX C+: CPT | Mod: 26

## 2024-02-24 PROCEDURE — 74177 CT ABD & PELVIS W/CONTRAST: CPT | Mod: 26

## 2024-02-24 PROCEDURE — 71260 CT THORAX DX C+: CPT

## 2024-02-27 LAB
CYTOLOGY CVX/VAG DOC THIN PREP: ABNORMAL
HPV HIGH+LOW RISK DNA PNL CVX: NOT DETECTED

## 2024-02-27 NOTE — HISTORY OF PRESENT ILLNESS
[FreeTextEntry1] : Ms. Marrero has a h/o stage IIB cervical SCC (with bilateral pelvic node involvement on imaging at diagnosis)  and is a former patient of Dr. Larson. She was treated on the Outback trial and completed concurrent chemotherapy and pelvic RT/vaginal brachytherapy completed in 12/2013. She was randomized to receive additional systemic cytotoxic chemotherapy and completed this with Dr. Mandujano in 3/2014.  Subsequently, she underwent TANIA/BSO in [facility] in early 2015 due to bleeding, and per notes, there was no residual disease on the specimen.   SHe has been ELVIS since that time.  She notes narinder past week there is suprapubic pain radiating to groins. No urinary symptoms at all. She otherwise feels well and denies pelvic pain or any other associated signs or symptoms.  She has had spotting after intercourse which is longstanding and occasional scant bleeding with wiping after urination; however none of this in the past 6 months.  She does not use a dilator. She is followed by Urology, Dr. Sierra - CT identified a bladder lesion, she had office cystoscopy; radiation cystitis; she denies any hematuria since summer 2022.   IMAGING: PET/CT 3/2016 - no recurrence. Decreased size and metabolism of small cervical nodes.  CT C/A/P 8/9/17: ELVIS (questionable mild posterior bladder wall thickening and post-tx changes of sigmoid colon.) PET was not approved.  1/23/19 PET/CT noted a left retroclavicular LN unchanged in size and demonstrating FDG activity (SUV 3.7), biopsy 2/6/19 was negative. 3/27/19 CT A/P: small b/l renal angiomyolipomas; 6mm bladder polypoid lesion with air bubbles in bladder.  HM: 12/26/19: PAP negative/HRHPV (-) 6/2019 PAP negative/HRHPV (-)/ vaginal biopsy benign. 12/20/18 PAP negative; HRHPV (-); vaginal biopsy c/w post-RT changes. 3/2018 PAP (-)/HRHPV (-) 6/12/2020:  PAP:  Negative for intraepithelial lesion or malignancy.  Negative HPV 6/17/21:  Vagina, Right:  Dilated and anastomosing vascular spaces in the subepithelial tissue, with hemorrhage and fibrin.  Negative for malignancy.   6/17/21:  Negative for intraepithelial lesion or malignancy.  Negative HPV 12/16/21: vag PAP negative/HPV (-) 7/2022: vag PAP negative/HPV (-) 1/2023: VAG PAP negative/ HRHPV (-) 8/2023: vag PAP negative/ HRHPV (-)  Mammo: up to date per pt Colonoscopy: 10/2015 radiation proctitis  PCP: Dr. Porter Camacho, Dr. Aletha Zamora in same office Med Onc: Dr. Mandujano Rad Onc: Dr. Resendiz Urologist: Dr. Rosa Sierra

## 2024-02-27 NOTE — REASON FOR VISIT
[FreeTextEntry1] : surveillance; IIB cervical cancer s/p chemoradiation 2013 and subsequent TANIA/BSO 2015

## 2024-02-27 NOTE — PHYSICAL EXAM
[Chaperone Present] : A chaperone was present in the examining room during all aspects of the physical examination [Absent] : Adnexa(ae): Absent [Normal] : Recto-Vaginal Exam: Normal [Fully active, able to carry on all pre-disease performance without restriction] : Status 0 - Fully active, able to carry on all pre-disease performance without restriction [FreeTextEntry1] : Ara [de-identified] : vagina is shortened and stenotic c/w treatment effect, no lesions [de-identified] : no mass [de-identified] : confirmatory

## 2024-02-27 NOTE — DISCUSSION/SUMMARY
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 (addendum: PAP negative/HRHPV (-) - CT C/A/P ordered; eval new pain; no scans since 2019 (addendum: CT : ELVIS; Patient notified by BJS) - HM reviewed - Risk, signs and symptoms of recurrence were reviewed.  -  All questions answered. She was again advised to see me on a 6 month alternating basis with Dr. Resendiz for continued surveillance.

## 2024-02-29 ENCOUNTER — APPOINTMENT (OUTPATIENT)
Dept: UROLOGY | Facility: CLINIC | Age: 59
End: 2024-02-29
Payer: COMMERCIAL

## 2024-02-29 VITALS
SYSTOLIC BLOOD PRESSURE: 111 MMHG | HEART RATE: 85 BPM | DIASTOLIC BLOOD PRESSURE: 74 MMHG | TEMPERATURE: 97.3 F | WEIGHT: 149 LBS | OXYGEN SATURATION: 97 % | BODY MASS INDEX: 32.15 KG/M2 | RESPIRATION RATE: 16 BRPM | HEIGHT: 57 IN

## 2024-02-29 DIAGNOSIS — Z87.448 PERSONAL HISTORY OF OTHER DISEASES OF URINARY SYSTEM: ICD-10-CM

## 2024-02-29 DIAGNOSIS — N30.40 IRRADIATION CYSTITIS W/OUT HEMATURIA: ICD-10-CM

## 2024-02-29 DIAGNOSIS — R10.2 PELVIC AND PERINEAL PAIN: ICD-10-CM

## 2024-02-29 PROCEDURE — 99213 OFFICE O/P EST LOW 20 MIN: CPT

## 2024-03-04 LAB
APPEARANCE: CLEAR
BACTERIA UR CULT: NORMAL
BACTERIA: NEGATIVE /HPF
BILIRUBIN URINE: NEGATIVE
BLOOD URINE: NEGATIVE
CAST: 1 /LPF
COLOR: NORMAL
EPITHELIAL CELLS: 2 /HPF
GLUCOSE QUALITATIVE U: NEGATIVE MG/DL
KETONES URINE: NEGATIVE MG/DL
LEUKOCYTE ESTERASE URINE: ABNORMAL
MICROSCOPIC-UA: NORMAL
NITRITE URINE: NEGATIVE
PH URINE: 6
PROTEIN URINE: NORMAL MG/DL
RED BLOOD CELLS URINE: 2 /HPF
SPECIFIC GRAVITY URINE: 1.02
UROBILINOGEN URINE: 1 MG/DL
WHITE BLOOD CELLS URINE: 7 /HPF

## 2024-03-10 ENCOUNTER — INPATIENT (INPATIENT)
Facility: HOSPITAL | Age: 59
LOS: 11 days | Discharge: HOME | DRG: 558 | End: 2024-03-22
Attending: INTERNAL MEDICINE | Admitting: INTERNAL MEDICINE
Payer: MEDICAID

## 2024-03-10 VITALS
HEART RATE: 85 BPM | OXYGEN SATURATION: 97 % | HEIGHT: 58 IN | SYSTOLIC BLOOD PRESSURE: 163 MMHG | TEMPERATURE: 98 F | DIASTOLIC BLOOD PRESSURE: 89 MMHG | RESPIRATION RATE: 16 BRPM | WEIGHT: 149.91 LBS

## 2024-03-10 PROBLEM — Z87.448 HISTORY OF HEMATURIA: Status: RESOLVED | Noted: 2019-03-22 | Resolved: 2024-03-10

## 2024-03-10 PROBLEM — R10.2 PELVIC PAIN: Status: ACTIVE | Noted: 2024-02-15

## 2024-03-10 LAB
ALBUMIN SERPL ELPH-MCNC: 2.4 G/DL — LOW (ref 3.5–5)
ALP SERPL-CCNC: 755 U/L — HIGH (ref 40–120)
ALT FLD-CCNC: 103 U/L DA — HIGH (ref 10–60)
ANION GAP SERPL CALC-SCNC: 5 MMOL/L — SIGNIFICANT CHANGE UP (ref 5–17)
ANISOCYTOSIS BLD QL: SLIGHT — SIGNIFICANT CHANGE UP
APPEARANCE UR: CLEAR — SIGNIFICANT CHANGE UP
AST SERPL-CCNC: 72 U/L — HIGH (ref 10–40)
BACTERIA # UR AUTO: ABNORMAL /HPF
BASOPHILS # BLD AUTO: 0 K/UL — SIGNIFICANT CHANGE UP (ref 0–0.2)
BASOPHILS NFR BLD AUTO: 0 % — SIGNIFICANT CHANGE UP (ref 0–2)
BILIRUB SERPL-MCNC: 0.3 MG/DL — SIGNIFICANT CHANGE UP (ref 0.2–1.2)
BILIRUB UR-MCNC: NEGATIVE — SIGNIFICANT CHANGE UP
BUN SERPL-MCNC: 17 MG/DL — SIGNIFICANT CHANGE UP (ref 7–18)
CALCIUM SERPL-MCNC: 9.1 MG/DL — SIGNIFICANT CHANGE UP (ref 8.4–10.5)
CHLORIDE SERPL-SCNC: 109 MMOL/L — HIGH (ref 96–108)
CO2 SERPL-SCNC: 25 MMOL/L — SIGNIFICANT CHANGE UP (ref 22–31)
COLOR SPEC: YELLOW — SIGNIFICANT CHANGE UP
COMMENT - URINE: SIGNIFICANT CHANGE UP
CREAT SERPL-MCNC: 0.68 MG/DL — SIGNIFICANT CHANGE UP (ref 0.5–1.3)
DIFF PNL FLD: NEGATIVE — SIGNIFICANT CHANGE UP
EGFR: 100 ML/MIN/1.73M2 — SIGNIFICANT CHANGE UP
EOSINOPHIL # BLD AUTO: 0.41 K/UL — SIGNIFICANT CHANGE UP (ref 0–0.5)
EOSINOPHIL NFR BLD AUTO: 3 % — SIGNIFICANT CHANGE UP (ref 0–6)
EPI CELLS # UR: PRESENT
GLUCOSE SERPL-MCNC: 131 MG/DL — HIGH (ref 70–99)
GLUCOSE UR QL: NEGATIVE MG/DL — SIGNIFICANT CHANGE UP
HCT VFR BLD CALC: 32 % — LOW (ref 34.5–45)
HGB BLD-MCNC: 10.4 G/DL — LOW (ref 11.5–15.5)
HYPOCHROMIA BLD QL: SLIGHT — SIGNIFICANT CHANGE UP
KETONES UR-MCNC: NEGATIVE MG/DL — SIGNIFICANT CHANGE UP
LACTATE SERPL-SCNC: 0.6 MMOL/L — LOW (ref 0.7–2)
LEUKOCYTE ESTERASE UR-ACNC: ABNORMAL
LG PLATELETS BLD QL AUTO: SLIGHT — SIGNIFICANT CHANGE UP
LIDOCAIN IGE QN: 48 U/L — SIGNIFICANT CHANGE UP (ref 13–75)
LYMPHOCYTES # BLD AUTO: 1.8 K/UL — SIGNIFICANT CHANGE UP (ref 1–3.3)
LYMPHOCYTES # BLD AUTO: 13 % — SIGNIFICANT CHANGE UP (ref 13–44)
MANUAL SMEAR VERIFICATION: SIGNIFICANT CHANGE UP
MCHC RBC-ENTMCNC: 29.5 PG — SIGNIFICANT CHANGE UP (ref 27–34)
MCHC RBC-ENTMCNC: 32.5 GM/DL — SIGNIFICANT CHANGE UP (ref 32–36)
MCV RBC AUTO: 90.7 FL — SIGNIFICANT CHANGE UP (ref 80–100)
MICROCYTES BLD QL: SLIGHT — SIGNIFICANT CHANGE UP
MONOCYTES # BLD AUTO: 0.83 K/UL — SIGNIFICANT CHANGE UP (ref 0–0.9)
MONOCYTES NFR BLD AUTO: 6 % — SIGNIFICANT CHANGE UP (ref 2–14)
NEUTROPHILS # BLD AUTO: 10.78 K/UL — HIGH (ref 1.8–7.4)
NEUTROPHILS NFR BLD AUTO: 78 % — HIGH (ref 43–77)
NITRITE UR-MCNC: NEGATIVE — SIGNIFICANT CHANGE UP
NRBC # BLD: 0 /100 WBCS — SIGNIFICANT CHANGE UP (ref 0–0)
PH UR: 6 — SIGNIFICANT CHANGE UP (ref 5–8)
PLAT MORPH BLD: NORMAL — SIGNIFICANT CHANGE UP
PLATELET # BLD AUTO: 499 K/UL — HIGH (ref 150–400)
POIKILOCYTOSIS BLD QL AUTO: SLIGHT — SIGNIFICANT CHANGE UP
POTASSIUM SERPL-MCNC: 3.9 MMOL/L — SIGNIFICANT CHANGE UP (ref 3.5–5.3)
POTASSIUM SERPL-SCNC: 3.9 MMOL/L — SIGNIFICANT CHANGE UP (ref 3.5–5.3)
PROT SERPL-MCNC: 7.6 G/DL — SIGNIFICANT CHANGE UP (ref 6–8.3)
PROT UR-MCNC: NEGATIVE MG/DL — SIGNIFICANT CHANGE UP
RBC # BLD: 3.53 M/UL — LOW (ref 3.8–5.2)
RBC # FLD: 13.2 % — SIGNIFICANT CHANGE UP (ref 10.3–14.5)
RBC BLD AUTO: ABNORMAL
RBC CASTS # UR COMP ASSIST: SIGNIFICANT CHANGE UP /HPF
SODIUM SERPL-SCNC: 139 MMOL/L — SIGNIFICANT CHANGE UP (ref 135–145)
SP GR SPEC: 1.01 — SIGNIFICANT CHANGE UP (ref 1–1.03)
UROBILINOGEN FLD QL: 0.2 MG/DL — SIGNIFICANT CHANGE UP (ref 0.2–1)
WBC # BLD: 13.82 K/UL — HIGH (ref 3.8–10.5)
WBC # FLD AUTO: 13.82 K/UL — HIGH (ref 3.8–10.5)
WBC UR QL: 5 /HPF — SIGNIFICANT CHANGE UP (ref 0–5)

## 2024-03-10 PROCEDURE — 74177 CT ABD & PELVIS W/CONTRAST: CPT | Mod: 26,MC

## 2024-03-10 PROCEDURE — 99285 EMERGENCY DEPT VISIT HI MDM: CPT

## 2024-03-10 RX ORDER — CYCLOBENZAPRINE HYDROCHLORIDE 10 MG/1
10 TABLET, FILM COATED ORAL ONCE
Refills: 0 | Status: COMPLETED | OUTPATIENT
Start: 2024-03-10 | End: 2024-03-10

## 2024-03-10 RX ORDER — ACETAMINOPHEN 500 MG
975 TABLET ORAL ONCE
Refills: 0 | Status: COMPLETED | OUTPATIENT
Start: 2024-03-10 | End: 2024-03-10

## 2024-03-10 RX ADMIN — Medication 975 MILLIGRAM(S): at 20:28

## 2024-03-10 RX ADMIN — Medication 975 MILLIGRAM(S): at 20:58

## 2024-03-10 RX ADMIN — CYCLOBENZAPRINE HYDROCHLORIDE 10 MILLIGRAM(S): 10 TABLET, FILM COATED ORAL at 20:28

## 2024-03-10 NOTE — ED ADULT NURSE NOTE - OBJECTIVE STATEMENT
Pt arrived to ED c/o Rt groin pain x 2 months getting progressively worse , interfering with ambulation

## 2024-03-10 NOTE — ED PROVIDER NOTE - CARE PLAN
Principal Discharge DX:	Abdominal pain   1 Principal Discharge DX:	Abdominal pain  Secondary Diagnosis:	Abscess

## 2024-03-10 NOTE — ED ADULT NURSE NOTE - SUICIDE SCREENING QUESTION 1
Render Risk Assessment In Note?: no
Additional Notes: Cutemol samples given to patient
Detail Level: Simple
No

## 2024-03-10 NOTE — ED PROVIDER NOTE - CLINICAL SUMMARY MEDICAL DECISION MAKING FREE TEXT BOX
58-year-old female with past history of cervical cancer status post chemo, status post hysterectomy, recent PET scan about 3 weeks ago was negative coming in with 2 months of right groin pain that is been getting worse.  States patient has been seen by Gynonc and urologist for these symptoms  -UA, physical exam, Pap smear and biopsies were all negative.  States recently seen at her urgent care where they noted she had mild erythema of the right thigh and started her on Keflex.  Reports the pain is still persistent.  Has been taking Motrin 800 mg every 8 hours without relief.  Reports the pain is alleviated when she rests and lies down and gets worse when she is bearing weight and walking on that side.  Patient is nontoxic-appearing.  No distress.  No spinal tenderness to palpation.  No paraspinal tenderness to palpation.  Positive TTP of right lower quadrant, right proximal thigh.  Full range of motion in both upper and lower extremity with slight limitation due to pain in the right lower extremity when flexing the hip.  No focal bony tenderness to palpation of the lower extremities.  Sensation is intact in lower extremities. DDx include but not limited to appendicitis, msk pain, UTI/cystitis.

## 2024-03-10 NOTE — ED PROVIDER NOTE - PROGRESS NOTE DETAILS
Donovan RIDDLE: Received sign out from Dr. Corona.  CT reported New 5.3 cm complex collection in the region of the right obturator   externus muscle medially, most consistent with abscess.    There is asymmetric mural thickening of the urinary bladder with   perivesical fat stranding, likely reactively inflamed.    Given the patient's prior history of cervical cancer, follow-up imaging   is recommended after resolution of symptoms or within 6-8 weeks.    1.2 cm indeterminate right hepatic lobe hyperdense focus. This can be   further characterized with nonemergent hepatic protocol MRI. IV Clinda ordered.  Surgical HO endorsed and will eval. patient evaluated by surgical house officer, patient to be admitted to medicine service for IR drainage of abscess.  Dr. Arenas and medical admitting resident endorsed.  I had a detailed discussion with the patient and/or guardian regarding the historical points, exam findings, and any diagnostic results supporting the admit diagnosis.

## 2024-03-10 NOTE — ASSESSMENT
[FreeTextEntry1] : 59 yo F with pelvic pain, previous history of radiation cystitis  - Reviewed CT imaging from 2/24/24 through PACS and confirmed findings as stated above - Discussed potential etiologies for symptoms including  vs. non- - Reviewed ways to manage symptoms including NSAIDs, heating packs, IC diet - UA, culture - FU as needed

## 2024-03-10 NOTE — PHYSICAL EXAM
[Normal Appearance] : normal appearance [Well Groomed] : well groomed [Edema] : no peripheral edema [General Appearance - In No Acute Distress] : no acute distress [Respiration, Rhythm And Depth] : normal respiratory rhythm and effort [Exaggerated Use Of Accessory Muscles For Inspiration] : no accessory muscle use [Abdomen Tenderness] : non-tender [Abdomen Soft] : soft [Costovertebral Angle Tenderness] : no ~M costovertebral angle tenderness [Urinary Bladder Findings] : the bladder was normal on palpation [No Focal Deficits] : no focal deficits [Oriented To Time, Place, And Person] : oriented to person, place, and time [] : no rash [Mood] : the mood was normal [Affect] : the affect was normal

## 2024-03-11 DIAGNOSIS — L02.91 CUTANEOUS ABSCESS, UNSPECIFIED: ICD-10-CM

## 2024-03-11 DIAGNOSIS — R74.8 ABNORMAL LEVELS OF OTHER SERUM ENZYMES: ICD-10-CM

## 2024-03-11 DIAGNOSIS — R10.9 UNSPECIFIED ABDOMINAL PAIN: ICD-10-CM

## 2024-03-11 DIAGNOSIS — C53.9 MALIGNANT NEOPLASM OF CERVIX UTERI, UNSPECIFIED: ICD-10-CM

## 2024-03-11 DIAGNOSIS — Z29.9 ENCOUNTER FOR PROPHYLACTIC MEASURES, UNSPECIFIED: ICD-10-CM

## 2024-03-11 LAB
A1C WITH ESTIMATED AVERAGE GLUCOSE RESULT: 6.5 % — HIGH (ref 4–5.6)
ALBUMIN SERPL ELPH-MCNC: 2.3 G/DL — LOW (ref 3.5–5)
ALP SERPL-CCNC: 610 U/L — HIGH (ref 40–120)
ALT FLD-CCNC: 89 U/L DA — HIGH (ref 10–60)
ANION GAP SERPL CALC-SCNC: 5 MMOL/L — SIGNIFICANT CHANGE UP (ref 5–17)
APTT BLD: 41.8 SEC — HIGH (ref 24.5–35.6)
AST SERPL-CCNC: 42 U/L — HIGH (ref 10–40)
BILIRUB SERPL-MCNC: 0.3 MG/DL — SIGNIFICANT CHANGE UP (ref 0.2–1.2)
BLD GP AB SCN SERPL QL: SIGNIFICANT CHANGE UP
BUN SERPL-MCNC: 13 MG/DL — SIGNIFICANT CHANGE UP (ref 7–18)
CALCIUM SERPL-MCNC: 8.5 MG/DL — SIGNIFICANT CHANGE UP (ref 8.4–10.5)
CHLORIDE SERPL-SCNC: 108 MMOL/L — SIGNIFICANT CHANGE UP (ref 96–108)
CO2 SERPL-SCNC: 24 MMOL/L — SIGNIFICANT CHANGE UP (ref 22–31)
CREAT SERPL-MCNC: 0.68 MG/DL — SIGNIFICANT CHANGE UP (ref 0.5–1.3)
EGFR: 100 ML/MIN/1.73M2 — SIGNIFICANT CHANGE UP
ESTIMATED AVERAGE GLUCOSE: 140 MG/DL — HIGH (ref 68–114)
GLUCOSE BLDC GLUCOMTR-MCNC: 108 MG/DL — HIGH (ref 70–99)
GLUCOSE SERPL-MCNC: 282 MG/DL — HIGH (ref 70–99)
HCT VFR BLD CALC: 31 % — LOW (ref 34.5–45)
HGB BLD-MCNC: 9.8 G/DL — LOW (ref 11.5–15.5)
INR BLD: 1.12 RATIO — SIGNIFICANT CHANGE UP (ref 0.85–1.18)
MAGNESIUM SERPL-MCNC: 1.8 MG/DL — SIGNIFICANT CHANGE UP (ref 1.6–2.6)
MCHC RBC-ENTMCNC: 29.4 PG — SIGNIFICANT CHANGE UP (ref 27–34)
MCHC RBC-ENTMCNC: 31.6 GM/DL — LOW (ref 32–36)
MCV RBC AUTO: 93.1 FL — SIGNIFICANT CHANGE UP (ref 80–100)
MRSA PCR RESULT.: SIGNIFICANT CHANGE UP
NRBC # BLD: 0 /100 WBCS — SIGNIFICANT CHANGE UP (ref 0–0)
PHOSPHATE SERPL-MCNC: 3.5 MG/DL — SIGNIFICANT CHANGE UP (ref 2.5–4.5)
PLATELET # BLD AUTO: 450 K/UL — HIGH (ref 150–400)
POTASSIUM SERPL-MCNC: 3.8 MMOL/L — SIGNIFICANT CHANGE UP (ref 3.5–5.3)
POTASSIUM SERPL-SCNC: 3.8 MMOL/L — SIGNIFICANT CHANGE UP (ref 3.5–5.3)
PROT SERPL-MCNC: 7.1 G/DL — SIGNIFICANT CHANGE UP (ref 6–8.3)
PROTHROM AB SERPL-ACNC: 12.7 SEC — SIGNIFICANT CHANGE UP (ref 9.5–13)
RBC # BLD: 3.33 M/UL — LOW (ref 3.8–5.2)
RBC # FLD: 13.3 % — SIGNIFICANT CHANGE UP (ref 10.3–14.5)
S AUREUS DNA NOSE QL NAA+PROBE: SIGNIFICANT CHANGE UP
SODIUM SERPL-SCNC: 137 MMOL/L — SIGNIFICANT CHANGE UP (ref 135–145)
WBC # BLD: 11.33 K/UL — HIGH (ref 3.8–10.5)
WBC # FLD AUTO: 11.33 K/UL — HIGH (ref 3.8–10.5)

## 2024-03-11 PROCEDURE — 77012 CT SCAN FOR NEEDLE BIOPSY: CPT | Mod: 26

## 2024-03-11 PROCEDURE — 10160 PNXR ASPIR ABSC HMTMA BULLA: CPT

## 2024-03-11 PROCEDURE — 99221 1ST HOSP IP/OBS SF/LOW 40: CPT

## 2024-03-11 RX ORDER — MORPHINE SULFATE 50 MG/1
2 CAPSULE, EXTENDED RELEASE ORAL EVERY 6 HOURS
Refills: 0 | Status: DISCONTINUED | OUTPATIENT
Start: 2024-03-11 | End: 2024-03-14

## 2024-03-11 RX ORDER — VANCOMYCIN HCL 1 G
1000 VIAL (EA) INTRAVENOUS EVERY 12 HOURS
Refills: 0 | Status: DISCONTINUED | OUTPATIENT
Start: 2024-03-11 | End: 2024-03-13

## 2024-03-11 RX ORDER — KETOROLAC TROMETHAMINE 30 MG/ML
15 SYRINGE (ML) INJECTION EVERY 8 HOURS
Refills: 0 | Status: DISCONTINUED | OUTPATIENT
Start: 2024-03-11 | End: 2024-03-15

## 2024-03-11 RX ORDER — CEFEPIME 1 G/1
1000 INJECTION, POWDER, FOR SOLUTION INTRAMUSCULAR; INTRAVENOUS EVERY 8 HOURS
Refills: 0 | Status: DISCONTINUED | OUTPATIENT
Start: 2024-03-11 | End: 2024-03-13

## 2024-03-11 RX ORDER — SODIUM CHLORIDE 9 MG/ML
1000 INJECTION, SOLUTION INTRAVENOUS
Refills: 0 | Status: DISCONTINUED | OUTPATIENT
Start: 2024-03-11 | End: 2024-03-11

## 2024-03-11 RX ORDER — INFLUENZA VIRUS VACCINE 15; 15; 15; 15 UG/.5ML; UG/.5ML; UG/.5ML; UG/.5ML
0.5 SUSPENSION INTRAMUSCULAR ONCE
Refills: 0 | Status: COMPLETED | OUTPATIENT
Start: 2024-03-11 | End: 2024-03-22

## 2024-03-11 RX ORDER — ONDANSETRON 8 MG/1
4 TABLET, FILM COATED ORAL EVERY 8 HOURS
Refills: 0 | Status: DISCONTINUED | OUTPATIENT
Start: 2024-03-11 | End: 2024-03-22

## 2024-03-11 RX ORDER — ACETAMINOPHEN 500 MG
650 TABLET ORAL EVERY 6 HOURS
Refills: 0 | Status: DISCONTINUED | OUTPATIENT
Start: 2024-03-11 | End: 2024-03-22

## 2024-03-11 RX ORDER — LANOLIN ALCOHOL/MO/W.PET/CERES
3 CREAM (GRAM) TOPICAL AT BEDTIME
Refills: 0 | Status: DISCONTINUED | OUTPATIENT
Start: 2024-03-11 | End: 2024-03-22

## 2024-03-11 RX ADMIN — SODIUM CHLORIDE 110 MILLILITER(S): 9 INJECTION, SOLUTION INTRAVENOUS at 07:31

## 2024-03-11 RX ADMIN — CEFEPIME 100 MILLIGRAM(S): 1 INJECTION, POWDER, FOR SOLUTION INTRAMUSCULAR; INTRAVENOUS at 14:07

## 2024-03-11 RX ADMIN — Medication 100 MILLIGRAM(S): at 02:18

## 2024-03-11 RX ADMIN — Medication 100 MILLIGRAM(S): at 06:15

## 2024-03-11 RX ADMIN — Medication 15 MILLIGRAM(S): at 22:38

## 2024-03-11 RX ADMIN — Medication 15 MILLIGRAM(S): at 22:08

## 2024-03-11 RX ADMIN — SODIUM CHLORIDE 110 MILLILITER(S): 9 INJECTION, SOLUTION INTRAVENOUS at 02:21

## 2024-03-11 RX ADMIN — CEFEPIME 100 MILLIGRAM(S): 1 INJECTION, POWDER, FOR SOLUTION INTRAMUSCULAR; INTRAVENOUS at 21:20

## 2024-03-11 RX ADMIN — Medication 250 MILLIGRAM(S): at 18:35

## 2024-03-11 NOTE — PROGRESS NOTE ADULT - ASSESSMENT
admitted with inner thigh cellulitis  CT finding of collection at site of obturator externus muscle, not seen on ct few weeks prior  hx GYN ca in past    Recommend consult GYN/Onc due to pt's Ca hx and new finding in pelvic area  pt on abx  IR evaluating pt for possible drainage, will recommend await for GYN/Onc input  medical team notified  discussed with Dr Leon who re-reviewed CT and agrees with plan

## 2024-03-11 NOTE — H&P ADULT - PROBLEM SELECTOR PLAN 2
SCDs for now, hold for IR procedure    IMPROVE VTE Individual Risk Assessment    RISK                                                                Points  [  ] Previous VTE                                                  3  [  ] Thrombophilia                                               2  [  ] Lower limb paralysis                                      2        (unable to hold up >15 seconds)    [  ] Current Cancer                                              2         (within 6 months)  [ x ] Immobilization > 24 hrs                                1  [  ] ICU/CCU stay > 24 hours                              1  [  x] Age > 60                                                      1  IMPROVE VTE Score _____2____   ALT/AST 72/103  No symptoms at this time   CT with 1.2 cm indeterminate right hepatic lobe hyperdense focus. This can be further characterized with nonemergent hepatic protocol MR  Trend CMP

## 2024-03-11 NOTE — CONSULT NOTE ADULT - SUBJECTIVE AND OBJECTIVE BOX
INCOMPLETE    GENERAL SURGERY CONSULT NOTE    Patient is a 58y old  Female who presents with a chief complaint of     HPI:      PAST MEDICAL & SURGICAL HISTORY:  Cervical cancer  cervical cancer /hysterectomy          Review of Systems:    I have reviewed 9 systems with the patient and the only positive findings were     MEDICATIONS  (STANDING):  clindamycin IVPB 600 milliGRAM(s) IV Intermittent once  clindamycin IVPB 600 milliGRAM(s) IV Intermittent every 8 hours  dextrose 5% + sodium chloride 0.45%. 1000 milliLiter(s) (110 mL/Hr) IV Continuous <Continuous>    MEDICATIONS  (PRN):      Allergies    No Known Allergies    Intolerances        Social History:      FAMILY HISTORY:      Vital Signs Last 24 Hrs  T(C): 36.5 (11 Mar 2024 01:03), Max: 36.9 (10 Mar 2024 22:31)  T(F): 97.7 (11 Mar 2024 01:03), Max: 98.4 (10 Mar 2024 22:31)  HR: 80 (11 Mar 2024 01:03) (72 - 85)  BP: 118/77 (11 Mar 2024 01:03) (118/77 - 163/89)  BP(mean): --  RR: 18 (11 Mar 2024 01:03) (16 - 18)  SpO2: 96% (11 Mar 2024 01:03) (96% - 99%)    Parameters below as of 11 Mar 2024 01:03  Patient On (Oxygen Delivery Method): room air        Physical Exam:  Vital Signs Last 24 Hrs  T(C): 36.5 (11 Mar 2024 01:03), Max: 36.9 (10 Mar 2024 22:31)  T(F): 97.7 (11 Mar 2024 01:03), Max: 98.4 (10 Mar 2024 22:31)  HR: 80 (11 Mar 2024 01:03) (72 - 85)  BP: 118/77 (11 Mar 2024 01:03) (118/77 - 163/89)  BP(mean): --  RR: 18 (11 Mar 2024 01:03) (16 - 18)  SpO2: 96% (11 Mar 2024 01:03) (96% - 99%)    Parameters below as of 11 Mar 2024 01:03  Patient On (Oxygen Delivery Method): room air        General:  A&Ox3,Appears stated age, No acute distress,  Head: NC/AT  EENT: PERRLA. EOMI. Conjunctiva and sclera clear. Pharynx clear.  Neck: Supple. No JVD  Lungs: CTA B/l. Nonlabored Respirations  CV: +S1S2, RRR  Abdomen: Soft, Nondistended,  Nontender, no guarding, no rebound  Extremities: Warm and well perfused. 2+ peripheral pulses b/l. Calf soft, nontender b/l. No pedal edema.            LABS:                        10.4   13.82 )-----------( 499      ( 10 Mar 2024 20:42 )             32.0     03-10    139  |  109<H>  |  17  ----------------------------<  131<H>  3.9   |  25  |  0.68    Ca    9.1      10 Mar 2024 20:42    TPro  7.6  /  Alb  2.4<L>  /  TBili  0.3  /  DBili  x   /  AST  72<H>  /  ALT  103<H>  /  AlkPhos  755<H>  03-10    LIVER FUNCTIONS - ( 10 Mar 2024 20:42 )  Alb: 2.4 g/dL / Pro: 7.6 g/dL / ALK PHOS: 755 U/L / ALT: 103 U/L DA / AST: 72 U/L / GGT: x             Urinalysis Basic - ( 10 Mar 2024 21:15 )    Color: Yellow / Appearance: Clear / S.010 / pH: x  Gluc: x / Ketone: Negative mg/dL  / Bili: Negative / Urobili: 0.2 mg/dL   Blood: x / Protein: Negative mg/dL / Nitrite: Negative   Leuk Esterase: Small / RBC: None Seen /HPF / WBC 5 /HPF   Sq Epi: x / Non Sq Epi: x / Bacteria: Occasional /HPF        RADIOLOGY & ADDITIONAL STUDIES:  < from: CT Abdomen and Pelvis w/ IV Cont (03.10.24 @ 22:44) >  FINDINGS:  LOWER CHEST: Within normal limits.    LIVER: 1.2 cm indeterminate hyperdense focus in the right hepatic lobe   (2/40)  BILE DUCTS: Normal caliber.  GALLBLADDER: Cholelithiasis.  SPLEEN: Within normal limits.  PANCREAS: Within normal limits.  ADRENALS: 1.7 cm indeterminate left adrenal nodule, not substantially   changed  KIDNEYS/URETERS: Within normal limits.    BLADDER: Asymmetric mural thickening of the urinary bladder with   perivesical fat stranding  REPRODUCTIVE ORGANS: Hysterectomy. History of cervical cancer, now   definite nodular mass in the vaginal cuff.    BOWEL: No bowel obstruction. Appendix is normal.  PERITONEUM: No ascites.  VESSELS: Atherosclerotic changes.  RETROPERITONEUM/LYMPH NODES: No lymphadenopathy.  ABDOMINAL WALL: In the region of the right obturator externus muscle   medially there is a 5.3 x 3.8 x 3.5 cm complex peripherally enhancing   collection with surrounding fat stranding, new compared to prior  BONES: No acute osseous abnormality.    IMPRESSION:  New 5.3 cm complex collection in the region of the right obturator   externus muscle medially, most consistent with abscess.    There is asymmetric mural thickening of the urinary bladder with   perivesical fat stranding, likely reactively inflamed.    Given the patient's prior history of cervical cancer, follow-up imaging   is recommended after resolution of symptoms or within 6-8 weeks.    1.2 cm indeterminate right hepatic lobe hyperdense focus. This can be   further characterized with nonemergent hepatic protocol MRI.    < end of copied text >   GENERAL SURGERY CONSULT NOTE     chief complaint of Rt Inguinal pain x 2wks    HPI:  59y/o f with PMHx of cervical cancer s/p chemo in , s/p hysterectomy in , with a recent negative PET scan 3 weeks ago presents to the ED with Rt inguinal Pain x 2wks .  Pain is mostly in the Rt proximal inguinal thigh described as very debilitating .  Last week the pain got worse so she went to urgent care where she was given Keflex 500 mg tid.  She has been taking the Keflex since . The day before admission the pain became worse and so she came to the emergency room today. It gets worse when she bears weight and walks.  Denies fever, chills, SOB, lightheadedness, calf pain or any other complaints at this time.       PAST MEDICAL & SURGICAL HISTORY:  Cervical cancer  cervical cancer /hysterectomy          MEDICATIONS  (STANDING):  clindamycin IVPB 600 milliGRAM(s) IV Intermittent once  clindamycin IVPB 600 milliGRAM(s) IV Intermittent every 8 hours  dextrose 5% + sodium chloride 0.45%. 1000 milliLiter(s) (110 mL/Hr) IV Continuous <Continuous>    MEDICATIONS  (PRN):      Allergies    No Known Allergies    Intolerances        Social History:      FAMILY HISTORY:      Vital Signs Last 24 Hrs  T(C): 36.5 (11 Mar 2024 01:03), Max: 36.9 (10 Mar 2024 22:31)  T(F): 97.7 (11 Mar 2024 01:03), Max: 98.4 (10 Mar 2024 22:31)  HR: 80 (11 Mar 2024 01:03) (72 - 85)  BP: 118/77 (11 Mar 2024 01:03) (118/77 - 163/89)  BP(mean): --  RR: 18 (11 Mar 2024 01:03) (16 - 18)  SpO2: 96% (11 Mar 2024 01:03) (96% - 99%)    Parameters below as of 11 Mar 2024 01:03  Patient On (Oxygen Delivery Method): room air        General:  A&Ox3,Appears stated age, No acute distress,  Head: NC/AT  EENT: PERRLA. EOMI. Conjunctiva and sclera clear. Pharynx clear.  Neck: Supple. No JVD  Lungs: CTA B/l. Nonlabored Respirations  CV: +S1S2, RRR  Abdomen: Soft, Nondistended,  Nontender, no guarding, no rebound  Extremities: Rt proximal Inguinal medial tenderness, no obvious induration, or fluctuance. Other extremities are Warm and well perfused. 2+ peripheral pulses b/l. Calf soft, nontender b/l. No pedal edema.            LABS:                        10.4   13.82 )-----------( 499      ( 10 Mar 2024 20:42 )             32.0     03-10    139  |  109<H>  |  17  ----------------------------<  131<H>  3.9   |  25  |  0.68    Ca    9.1      10 Mar 2024 20:42    TPro  7.6  /  Alb  2.4<L>  /  TBili  0.3  /  DBili  x   /  AST  72<H>  /  ALT  103<H>  /  AlkPhos  755<H>  03-10    LIVER FUNCTIONS - ( 10 Mar 2024 20:42 )  Alb: 2.4 g/dL / Pro: 7.6 g/dL / ALK PHOS: 755 U/L / ALT: 103 U/L DA / AST: 72 U/L / GGT: x             Urinalysis Basic - ( 10 Mar 2024 21:15 )    Color: Yellow / Appearance: Clear / S.010 / pH: x  Gluc: x / Ketone: Negative mg/dL  / Bili: Negative / Urobili: 0.2 mg/dL   Blood: x / Protein: Negative mg/dL / Nitrite: Negative   Leuk Esterase: Small / RBC: None Seen /HPF / WBC 5 /HPF   Sq Epi: x / Non Sq Epi: x / Bacteria: Occasional /HPF        RADIOLOGY & ADDITIONAL STUDIES:  < from: CT Abdomen and Pelvis w/ IV Cont (03.10.24 @ 22:44) >  FINDINGS:  LOWER CHEST: Within normal limits.    LIVER: 1.2 cm indeterminate hyperdense focus in the right hepatic lobe   (2/40)  BILE DUCTS: Normal caliber.  GALLBLADDER: Cholelithiasis.  SPLEEN: Within normal limits.  PANCREAS: Within normal limits.  ADRENALS: 1.7 cm indeterminate left adrenal nodule, not substantially   changed  KIDNEYS/URETERS: Within normal limits.    BLADDER: Asymmetric mural thickening of the urinary bladder with   perivesical fat stranding  REPRODUCTIVE ORGANS: Hysterectomy. History of cervical cancer, now   definite nodular mass in the vaginal cuff.    BOWEL: No bowel obstruction. Appendix is normal.  PERITONEUM: No ascites.  VESSELS: Atherosclerotic changes.  RETROPERITONEUM/LYMPH NODES: No lymphadenopathy.  ABDOMINAL WALL: In the region of the right obturator externus muscle   medially there is a 5.3 x 3.8 x 3.5 cm complex peripherally enhancing   collection with surrounding fat stranding, new compared to prior  BONES: No acute osseous abnormality.    IMPRESSION:  New 5.3 cm complex collection in the region of the right obturator   externus muscle medially, most consistent with abscess.    There is asymmetric mural thickening of the urinary bladder with   perivesical fat stranding, likely reactively inflamed.    Given the patient's prior history of cervical cancer, follow-up imaging   is recommended after resolution of symptoms or within 6-8 weeks.    1.2 cm indeterminate right hepatic lobe hyperdense focus. This can be   further characterized with nonemergent hepatic protocol MRI.    < end of copied text >

## 2024-03-11 NOTE — PROGRESS NOTE ADULT - SUBJECTIVE AND OBJECTIVE BOX
Pt c/o persistent pain right groin area inner thigh area. no n/v no change in bm  no dysuria  ICU Vital Signs Last 24 Hrs  T(C): 36.9 (11 Mar 2024 09:18), Max: 37.2 (11 Mar 2024 01:00)  T(F): 98.4 (11 Mar 2024 09:18), Max: 99 (11 Mar 2024 01:00)  HR: 75 (11 Mar 2024 09:18) (72 - 85)  BP: 128/82 (11 Mar 2024 09:18) (112/71 - 163/89)  BP(mean): --  ABP: --  ABP(mean): --  RR: 17 (11 Mar 2024 09:18) (16 - 18)  SpO2: 96% (11 Mar 2024 09:18) (96% - 99%)    O2 Parameters below as of 11 Mar 2024 09:18  Patient On (Oxygen Delivery Method): room air        Alert  Right inner thigh mild erythema, no fluctuance no crepitus  no hernias palpated with standing and valsalva  no cvat  Abd: soft nt nd                        9.8    11.33 )-----------( 450      ( 11 Mar 2024 05:00 )             31.0

## 2024-03-11 NOTE — CONSULT NOTE ADULT - ASSESSMENT
Keep NPO, IVF  IV ABx -Clinda  IR consult   Pain/nausea control prn   Other care/mgmt per primary team   Will folloe/update on surgical plans  57y/o f with PMHx of cervical cancer s/p chemo in 2013, s/p hysterectomy in 2015, with a recent negative PET scan 3 weeks ago presents to the ED with Rt inguinal Pain x 2wks .  Pain is mostly in the Rt proximal inguinal thigh described as very debilitating .  Last week the pain got worse so she went to urgent care where she was given Keflex 500 mg tid.  She has been taking the Keflex since March 6. The day before admission the pain became worse and so she came to the emergency room today. It gets worse when she bears weight and walks. Afebrle, mild leucocytosis. CT shows New 5.3 cm complex collection in the region of the right obturator externus muscle medially, most consistent with abscess.       Keep NPO, IVF  IV ABx -Clinda  IR consult   Pain/nausea control prn   Other care/mgmt per primary team   Will follow/update on surgical plans

## 2024-03-11 NOTE — CONSULT NOTE ADULT - PROBLEM SELECTOR RECOMMENDATION 9
panculture  antibiotics  ID consult  IR eval
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 Esposito explained findings to patient's daughter and plan by the medical team, in which she understood  Dr. Esposito reviewed imaging with radiologist from last month and yesterday, appears to be a new finding consistent with an infection  for IR drainage today  gyn/onc following, low suspicion for recurrent disease at this time  mngt per primary team  thank you for the consult

## 2024-03-11 NOTE — PRE PROCEDURE NOTE - PROCEDURE SERVICE
Diagnoses and all orders for this visit:  Annual physical exam  Encounter for routine laboratory testing  Comments:  Labwork for today looked great.  Will order labwork for next visit.  Orders:  -     CBC; Future  -     Basic Metabolic Panel; Future  -     Hepatic Function Panel; Future  -     Lipid Panel; Future  -     Vitamin D 25-Hydroxy,Total (for eval of Vitamin D levels); Future  -     Prostate Specific Antigen; Future  -     Hemoglobin A1C; Future  -     Albumin , Urine Random; Future  -     TSH with reflex to Free T4 if abnormal; Future  Encounter for prostate cancer screening  -     Prostate Specific Antigen; Future  Encounter for colorectal cancer screening  Comments:  Not due until 2024.  History of colonic polyps  Encounter for immunization  Comments:  Encouraged the shingles (shingrix) immunizations.  Discussed COVID-19 immunizations.  Patient not due for anything at this time.  Counseling regarding advanced care planning and goals of care  Comments:  Patient and spouse to confirm that Living Will and Healthcare Power of  (HCPoA) are up to date.  Type 2 diabetes mellitus with other specified complication, without long-term current use of insulin (CMS/[facility])  Comments:  Sugars look great. No changes at this time.  Orders:  -     liraglutide (Victoza 2-Thomas) 0.6 mg/0.1 mL (18 mg/3 mL) injection; Inject 0.3 mL (1.8 mg) under the skin once daily.  -     metFORMIN  mg 24 hr tablet; Take 1 tablet (500 mg) by mouth 2 times a day with meals.  -     CBC; Future  -     Hemoglobin A1C; Future  -     Albumin , Urine Random; Future  Essential hypertension  Comments:  Acceptable. No changes at this time.  Orders:  -     lisinopril 20 mg tablet; Take 1 tablet (20 mg) by mouth once daily.  -     Basic Metabolic Panel; Future  Mixed hyperlipidemia  Comments:  Looks great. No changes at this time.  Orders:  -     atorvastatin (Lipitor) 40 mg tablet; Take 1 tablet (40 mg) by mouth once daily.  -     Hepatic  Vascular and Interventional Radiology Function Panel; Future  -     Lipid Panel; Future  Subclinical hypothyroidism  Comments:  Check for next visit. Attempted levothyroxine; however, it appears that the patient stopped this. This is reasonable.  Orders:  -     TSH with reflex to Free T4 if abnormal; Future  Cerebrovascular disease  Comments:  Patient feels great. No changes at this time.  Orders:  -     aspirin 81 mg EC tablet; Take 1 tablet (81 mg) by mouth once daily.  -     atorvastatin (Lipitor) 40 mg tablet; Take 1 tablet (40 mg) by mouth once daily.  Stenosis of right carotid artery  -     Referral to Vascular Surgery; Future  Seizure disorder (CMS/HCC)  Comments:  Patient's keppra dosing is odd. I wonder if this should just be adjusted to 1,000mg twice / day. Encouraged the patient to discuss this with neurology.  Orders:  -     levETIRAcetam (Keppra) 500 mg tablet; Take 1.5 tablets (750 mg) by mouth once daily in the morning AND 2.5 tablets (1,250 mg) once daily at bedtime.  Vitamin D deficiency  Comments:  Continue supplementation.  Orders:  -     cholecalciferol (Vitamin D3) 50 mcg (2,000 unit) capsule; Take 1 capsule (2,000 Units) by mouth once daily.  -     Vitamin D 25-Hydroxy,Total (for eval of Vitamin D levels); Future  Other orders  -     Follow Up In Primary Care; Future

## 2024-03-11 NOTE — PROCEDURE NOTE - PROCEDURE FINDINGS AND DETAILS
- Right groin ill-defined collection identified, demonstrating interval decrease in size.   - 1 cc of purulent fluid aspirated - sample sent for culture and cytology, as requested.   - Official report to follow.

## 2024-03-11 NOTE — H&P ADULT - ASSESSMENT
She is a 58-year-old woman with a remote history of uterine cancer who presents with right-sided groin abscess.  IR will drain the abscess during this admission.

## 2024-03-11 NOTE — PRE PROCEDURE NOTE - PRE PROCEDURE EVALUATION
Interventional Radiology Pre-Procedure Note    Diagnosis/Indication: Patient is a 58y old Female with history of cervical cancer s/p TANIA/BSO and chemo/XRT, who p/w collection in the right obturator externus muscle, concern for abscess vs malignancy. Patient presents for aspiration of the collection.     PAST MEDICAL & SURGICAL HISTORY:  Cervical cancer  cervical cancer /hysterectomy       Allergies: No Known Allergies      LABS:  CBC Full  -  ( 11 Mar 2024 05:00 )  WBC Count : 11.33 K/uL  RBC Count : 3.33 M/uL  Hemoglobin : 9.8 g/dL  Hematocrit : 31.0 %  Platelet Count - Automated : 450 K/uL  Mean Cell Volume : 93.1 fl  Mean Cell Hemoglobin : 29.4 pg  Mean Cell Hemoglobin Concentration : 31.6 gm/dL    03-11    137  |  108  |  13  ----------------------------<  282<H>  3.8   |  24  |  0.68    Ca    8.5      11 Mar 2024 05:00  Phos  3.5     03-11  Mg     1.8     03-11    TPro  7.1  /  Alb  2.3<L>  /  TBili  0.3  /  DBili  x   /  AST  42<H>  /  ALT  89<H>  /  AlkPhos  610<H>  03-11    PT/INR - ( 11 Mar 2024 11:15 )   PT: 12.7 sec;   INR: 1.12 ratio      PTT - ( 11 Mar 2024 11:15 )  PTT:41.8 sec    Procedure/ risks/ benefits/ alternatives were discussed with the patient and patient's daughter, both of whom verbalize understanding, and witnessed informed consent was obtained.

## 2024-03-11 NOTE — H&P ADULT - PROBLEM SELECTOR PLAN 1
CTshows a 5.3 cm complex collection in the right obturator externus medially indicative of abscess  Surgery has seen the patient and deferred to IR who will drain the abscess during this admission  Status post clindamycin in the ER  Continue with cefazolin IV  Continue with IV fluids, pain control  NPO for IR procedure CTshows a 5.3 cm complex collection in the right obturator externus medially indicative of abscess  Surgery has seen the patient and deferred to IR who will drain the abscess during this admission  Status post clindamycin in the ER  Continue with IV clindamycin   Continue with IV fluids, pain control  NPO for IR procedure CTshows a 5.3 cm complex collection in the right obturator externus medially indicative of abscess  Surgery has seen the patient and deferred to IR who may drain the abscess during this admission  Status post clindamycin in the ER  Continue with IV clindamycin   Continue with IV fluids, pain control  NPO for possible IR procedure-- morning team to consult IR to confirm CTshows a 5.3 cm complex collection in the right obturator externus medially indicative of abscess  Surgery has seen the patient and deferred to IR who may drain the abscess during this admission  Status post clindamycin in the ER  Per ID Dr. Cline, started cefepime + vanc   Continue with IV fluids, pain control  NPO for possible IR procedure-- morning team to consult IR to confirm

## 2024-03-11 NOTE — H&P ADULT - NSHPPHYSICALEXAM_GEN_ALL_CORE
Gen: AOx3, NAD, non-toxic, pleasant  HEAD:  Atraumatic, Normocephalic  EYES: PERRLA, conjunctiva and sclera clear  ENT: Moist mucous membranes  NECK: Supple, No JVD  CV: S1+S2 normal, no murmurs   Resp: Clear bilat, no resp distress, no crackles/wheezes  Abd: Soft, nontender, +BS  Ext: No LE edema, no cyanosis, LE pulses present  IV/Skin: +R sided groin tenderness and fullness with fluctuance, without erythema  Msk: No low back pain, no arthralgias, no joint swelling  Neuro: AAOx3. No focal deficits  Psych: no anxiety, no delusional ideas, no suicidal ideation

## 2024-03-11 NOTE — CONSULT NOTE ADULT - ASSESSMENT
Patient is a 58y old  Female with history of cervical cancer s/p chemo in 2013, s/p hysterectomy in 2015, with a recent negative PET scan 3 weeks ago, now coming in with 2 months h/o worsening right groin pain. One month ago she saw her Gynecologist and Urologist for the pain. Pap smear and biopsies were negative.  The UA was negative at that time. She was told to continue to take Motrin for symptomatic relief, which she has been taking every 8 hours without any relief.  Last week the pain got worse so she went to urgent care where she was given Keflex 500 mg.  She has been taking the Keflex since March 6. The day before admission the pain became worse and so she came to the emergency room for further evaluation. On admission, he has no fever but leukocytosis and the CT abd/pelvis shows a 5.3 cm complex collection in the right obturator externus muscle medially, consistent with an abscess.  The Surgery has seen her and deferred to IR for drainage.  She was started on IV clindamycin and the ID consult requested to assist with further evaluation and antibiotic management.    # Right obturator externus muscle Abscess    would recommend:    1. IR guided drainage of the Abscess with culture  2. Monitor WBC count  3. Change Clindamycin to IV Vancomycin and Cefepime until work up is done  4. Pain management as needed    will follow the patient with you and make further recommendation based on the clinical course and Lab results  Thank you for the opportunity to participate in Ms. PORRAS's care    Attending Attestation:    Spent more than 65 minutes on total encounter, more than 50 % of the visit was spent counseling and/or coordinating care by the Attending physician.     Patient is a 58y old  Female with history of cervical cancer s/p chemo in 2013, s/p hysterectomy in 2015, with a recent negative PET scan 3 weeks ago, now coming in with 2 months h/o worsening right groin pain. One month ago she saw her Gynecologist and Urologist for the pain. Pap smear and biopsies were negative.  The UA was negative at that time. She was told to continue to take Motrin for symptomatic relief, which she has been taking every 8 hours without any relief.  Last week the pain got worse so she went to urgent care where she was given Keflex 500 mg.  She has been taking the Keflex since March 6. The day before admission the pain became worse and so she came to the emergency room for further evaluation. On admission, he has no fever but leukocytosis and the CT abd/pelvis shows a 5.3 cm complex collection in the right obturator externus muscle medially, consistent with an abscess.  The Surgery has seen her and deferred to IR for drainage.  She was started on IV clindamycin and the ID consult requested to assist with further evaluation and antibiotic management.    # Right obturator externus muscle Abscess  # Cervical cancer -  s/p chemo in 2013, s/p hysterectomy in 2015,    would recommend:    1. IR guided drainage of the Abscess with culture  2. Monitor WBC count  3. Change Clindamycin to IV Vancomycin and Cefepime until work up is done  4. Pain management as needed    d/w Family at the bed side    will follow the patient with you and make further recommendation based on the clinical course and Lab results  Thank you for the opportunity to participate in Ms. PORRAS's care    Attending Attestation:    Spent more than 65 minutes on total encounter, more than 50 % of the visit was spent counseling and/or coordinating care by the Attending physician.

## 2024-03-11 NOTE — CONSULT NOTE ADULT - ASSESSMENT
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 Esposito explained findings to patient's daughter and plan by the medical team, in which she understood  Dr. Esposito reviewed imaging with radiologist from last month and yesterday, appears to be a new findings consistent with an infection  for IR drainage today  gyn/onc following, low suspicion for recurrent disease at this time  mngt per primary team  thank you for the consult

## 2024-03-11 NOTE — CONSULT NOTE ADULT - SUBJECTIVE AND OBJECTIVE BOX
GYN ONC PA Consult Note    Patient seen and evaluated at bedside with Dr. Esposito, admitted with right groin abscess, unable to bear weight possible I&R grain today.    GYN/onc consulted due to h/o stage IIB cervical SCC with bilateral pelvic node involvement, treated on the Outbreak trial s/p chemotherapy and pelvic RT/brachytherapy completed in 2013. Received additional systemic cytotoxic chemotherapy in 2014. Patient is s/p TANIA/BSO in [facility] in 2015 due to vaginal bleeding, pathology was negative.    Patient's daughter at bedside, reports patient has had right groin pain x2 months. Patient had follow up with Dr. Blanco on 2/15 in which CT scan chest/abd/pelvis were negative on 2/24. Patient also had f/u with urology,  h/o radiation cystitis s/p cysto in the office, last month which unremarkable findings and was instructed to treat the pain with OTC medication. Patient's pain increased last week and patient went to the urgent care in which the findings were consistent with a cellulitis of the right groin and marked at time of evaluation at urgent care. Patient's daughter explains area was painful, red and warm.  Patient yesterday was unable to bear weight which prompted her daughter to bring her to the ER yesterday. Patient's daughter states the area marked has improved since started of the antibiotics. Patient denies fever, chills, CP, SOB, abdominal pain, pelvic pain, n/v/diarrhea, hematuria, urgency, frequency and dysuria. Voiding without difficulty, currently NPO.    Vital Signs Last 24 Hrs  T(C): 36.9 (11 Mar 2024 09:18), Max: 37.2 (11 Mar 2024 01:00)  T(F): 98.4 (11 Mar 2024 09:18), Max: 99 (11 Mar 2024 01:00)  HR: 75 (11 Mar 2024 09:18) (72 - 85)  BP: 128/82 (11 Mar 2024 09:18) (112/71 - 163/89)  BP(mean): --  RR: 17 (11 Mar 2024 09:18) (16 - 18)  SpO2: 96% (11 Mar 2024 09:18) (96% - 99%)    Parameters below as of 11 Mar 2024 09:18  Patient On (Oxygen Delivery Method): room air    gen aox3, not in acute distress, appears well  lungs are clear and equal b/l  abd soft, nondistended, non tender, no guarding, no rebound, old incision noted well healed  right groin noted maurizio around the area of pain, currently not indurated, not warm to touch, non edematous, non tender                          9.8    11.33 )-----------( 450      ( 11 Mar 2024 05:00 )             31.0       < from: CT Abdomen and Pelvis w/ IV Cont (03.10.24 @ 22:44) >  IMPRESSION:  New 5.3 cm complex collection in the region of the right obturator   externus muscle medially, most consistent with abscess.    There is asymmetric mural thickening of the urinary bladder with   perivesical fat stranding, likely reactively inflamed.    Given the patient's prior history of cervical cancer, follow-up imaging   is recommended after resolution of symptoms or within 6-8 weeks.    1.2 cm indeterminate right hepatic lobe hyperdense focus. This can be   further characterized with nonemergent hepatic protocol MRI.    < end of copied text >

## 2024-03-11 NOTE — CONSULT NOTE ADULT - NS ATTEND AMEND GEN_ALL_CORE FT
Patient seen and evaluated with the gyn oncology team.  Patient is is well-known to the gyn oncology service at [facility].  Seen by Dr. Larson initially for her stage IIB cervical cancer s/p outback trial treatment, chemo/RT, and then additional chemotherapy based on her randomization.  Treatment was completed in3/2014.    Patient is followed by Dr Blanco now.  Seen on 2/15/24 with c/o pelvic pain.  Exam was unremarkable.  CT CAP 2/24/24 showed no evidence of any abnormality.  Patient pain persisted and migrated towards the right thigh and buttock.  Patient was seen at urgent care and was treated for a cellulitis with PO antibiotics.  She also saw Urology for her hematuria which was presumably from radiation cystitis.      The erythema appeared to have improved.  However patient c/o inability to put weight on her right leg [facility]  She does not have a fever but a leukocytosis and a left shift.    Repeat CT scan this admission showed a 5cm heterogeneous collection in the right medical upper thigh just below the public one, new compared to her previous CT scan in 2/24.    Clinical picture is c/w an abscess.  Risk of a recurrent disease is low.  Will need IR evaluation and drainage  Will ask if biopsy is possible vs drainage, and the possibility of sending material for both culture and also for tissue analysis.  will follow with you.    I spent 45 min reviewing chart, face-to-face encounter and examination of patient.

## 2024-03-11 NOTE — H&P ADULT - HISTORY OF PRESENT ILLNESS
She is a 58-year-old woman with history of cervical cancer s/p chemo in 2013, s/p hysterectomy in 2015, with a recent negative PET scan 3 weeks coming in with 2 months of right groin pain that has been getting worse. 1 month ago she saw her gynecologist and urologist for the pain. Pap smear and biopsies were negative.  UA was negative at that time. She was told to continue to take Motrin for symptomatic relief, which she has been taking every 8 hours without relief.  Last week the pain got worse so she went to urgent care where she was given Keflex 500 mg.  She has been taking the Keflex since March 6. The day before admission the pain became worse and so she came to the emergency room today. It gets worse when she bears weight and walks.  In the ER CT was positive for a 5.3 cm complex collection in the right obturator externus medially, most consistent with an abscess.  Surgery has seen her and deferred to IR for drainage in the morning.  She was started on IV clindamycin.     Review of systems is positive for 2 days of loss of appetite, nausea without vomiting, chills 4 days ago.  She has been afebrile.  She denies chest pain, shortness of breath, and headache.

## 2024-03-11 NOTE — CONSULT NOTE ADULT - SUBJECTIVE AND OBJECTIVE BOX
Patient is a 58y old  Female with history of cervical cancer s/p chemo in 2013, s/p hysterectomy in 2015, with a recent negative PET scan 3 weeks ago, now coming in with 2 months h/o worsening right groin pain. One month ago she saw her Gynecologist and Urologist for the pain. Pap smear and biopsies were negative.  The UA was negative at that time. She was told to continue to take Motrin for symptomatic relief, which she has been taking every 8 hours without any relief.  Last week the pain got worse so she went to urgent care where she was given Keflex 500 mg.  She has been taking the Keflex since March 6. The day before admission the pain became worse and so she came to the emergency room for further evaluation. On admission, he has no fever but leukocytosis and the CT abd/pelvis shows a 5.3 cm complex collection in the right obturator externus muscle medially, consistent with an abscess.  The Surgery has seen her and deferred to IR for drainage.  She was started on IV clindamycin and the ID consult requested to assist with further evaluation and antibiotic management.      REVIEW OF SYSTEMS: Total of twelve systems have been reviewed with patient and found to be negative unless mentioned in HPI        PAST MEDICAL & SURGICAL HISTORY:  Cervical cancer  cervical cancer /hysterectomy        SOCIAL HISTORY  Alcohol: Does not drink  Tobacco: Does not smoke  Illicit substance use: None      FAMILY HISTORY: Non contributory to the present illness      ALLERGIES: No Known Allergies      Vital Signs Last 24 Hrs  T(C): 37 (11 Mar 2024 15:22), Max: 37.2 (11 Mar 2024 01:00)  T(F): 98.6 (11 Mar 2024 15:22), Max: 99 (11 Mar 2024 01:00)  HR: 76 (11 Mar 2024 15:22) (72 - 85)  BP: 125/77 (11 Mar 2024 15:22) (112/71 - 163/89)  BP(mean): --  RR: 18 (11 Mar 2024 15:22) (16 - 18)  SpO2: 96% (11 Mar 2024 15:22) (96% - 99%)    Parameters below as of 11 Mar 2024 13:20  Patient On (Oxygen Delivery Method): room air      PHYSICAL EXAM:  GENERAL: Not in distress   CHEST/LUNG: Not using accessory muscles  HEART: s1 and s2 present  ABDOMEN:  Nontender and  Nondistended  EXTREMITIES: No pedal  edema  CNS: Awake and Alert      LABS:                        9.8    11.33 )-----------( 450      ( 11 Mar 2024 05:00 )             31.0       03-11    137  |  108  |  13  ----------------------------<  282<H>  3.8   |  24  |  0.68    Ca    8.5      11 Mar 2024 05:00  Phos  3.5     03-11  Mg     1.8     03-11    TPro  7.1  /  Alb  2.3<L>  /  TBili  0.3  /  DBili  x   /  AST  42<H>  /  ALT  89<H>  /  AlkPhos  610<H>  03-11    PT/INR - ( 11 Mar 2024 11:15 )   PT: 12.7 sec;   INR: 1.12 ratio      PTT - ( 11 Mar 2024 11:15 )  PTT:41.8 sec        CAPILLARY BLOOD GLUCOSE  POCT Blood Glucose.: 108 mg/dL (11 Mar 2024 07:44)        MEDICATIONS  (STANDING):  cefepime   IVPB 1000 milliGRAM(s) IV Intermittent every 8 hours  dextrose 5% + sodium chloride 0.45%. 1000 milliLiter(s) (110 mL/Hr) IV Continuous <Continuous>  influenza   Vaccine 0.5 milliLiter(s) IntraMuscular once  vancomycin  IVPB 1000 milliGRAM(s) IV Intermittent every 12 hours    MEDICATIONS  (PRN):  acetaminophen     Tablet .. 650 milliGRAM(s) Oral every 6 hours PRN Temp greater or equal to 38C (100.4F), Mild Pain (1 - 3)  aluminum hydroxide/magnesium hydroxide/simethicone Suspension 30 milliLiter(s) Oral every 4 hours PRN Dyspepsia  ketorolac   Injectable 15 milliGRAM(s) IV Push every 8 hours PRN Moderate Pain (4 - 6)  melatonin 3 milliGRAM(s) Oral at bedtime PRN Insomnia  morphine  - Injectable 2 milliGRAM(s) IV Push every 6 hours PRN Severe Pain (7 - 10)  ondansetron Injectable 4 milliGRAM(s) IV Push every 8 hours PRN Nausea and/or Vomiting        RADIOLOGY & ADDITIONAL TESTS:    3/10/24: CT Abdomen and Pelvis w/ IV Cont (03.10.24 @ 22:44) New 5.3 cm complex collection in the region of the right obturator   externus muscle medially, most consistent with abscess.    There is asymmetric mural thickening of the urinary bladder with perivesical fat stranding, likely reactively inflamed.    Given the patient's prior history of cervical cancer, follow-up imaging is recommended after resolution of symptoms or within 6-8 weeks.    1.2 cm indeterminate right hepatic lobe hyperdense focus. This can be further characterized with nonemergent hepatic protocol MRI.        MICROBIOLOGY DATA:    Urinalysis + Microscopic Examination (03.10.24 @ 21:15)   pH Urine: 6.0  Urine Appearance: Clear  Color: Yellow  Specific Gravity: 1.010  Protein, Urine: Negative mg/dL  Glucose Qualitative, Urine: Negative mg/dL  Ketone - Urine: Negative mg/dL  Blood, Urine: Negative  Bilirubin: Negative  Urobilinogen: 0.2 mg/dL  Leukocyte Esterase Concentration: Small  Nitrite: Negative  White Blood Cell - Urine: 5 /HPF  Red Blood Cell - Urine: None Seen /HPF  Bacteria: Occasional /HPF  Squamous Epithelial Cells: Present: few  Comment - Urine: Occasional renal tubular epithelial cells present               Patient is a 58y old  Female with history of cervical cancer s/p chemo in 2013, s/p hysterectomy in 2015, with a recent negative PET scan 3 weeks ago, now coming in with 2 months h/o worsening right groin pain. One month ago she saw her Gynecologist and Urologist for the pain. Pap smear and biopsies were negative.  The UA was negative at that time. She was told to continue to take Motrin for symptomatic relief, which she has been taking every 8 hours without any relief.  Last week the pain got worse so she went to urgent care where she was given Keflex 500 mg.  She has been taking the Keflex since March 6. The day before admission the pain became worse and so she came to the emergency room for further evaluation. On admission, he has no fever but leukocytosis and the CT abd/pelvis shows a 5.3 cm complex collection in the right obturator externus muscle medially, consistent with an abscess.  The Surgery has seen her and deferred to IR for drainage.  She was started on IV clindamycin and the ID consult requested to assist with further evaluation and antibiotic management.      REVIEW OF SYSTEMS: Total of twelve systems have been reviewed with Family at the bed side and found to be negative unless mentioned in HPI      PAST MEDICAL & SURGICAL HISTORY:  Cervical cancer  cervical cancer /hysterectomy      SOCIAL HISTORY  Alcohol: Does not drink  Tobacco: Does not smoke  Illicit substance use: None      FAMILY HISTORY: Non contributory to the present illness      ALLERGIES: No Known Allergies      PHYSICAL EXAM:  Vital Signs Last 24 Hrs  T(C): 37 (11 Mar 2024 15:22), Max: 37.2 (11 Mar 2024 01:00)  T(F): 98.6 (11 Mar 2024 15:22), Max: 99 (11 Mar 2024 01:00)  HR: 76 (11 Mar 2024 15:22) (72 - 85)  BP: 125/77 (11 Mar 2024 15:22) (112/71 - 163/89)  BP(mean): --  RR: 18 (11 Mar 2024 15:22) (16 - 18)  SpO2: 96% (11 Mar 2024 15:22) (96% - 99%)    Parameters below as of 11 Mar 2024 13:20  Patient On (Oxygen Delivery Method): room air        LABS:                        9.8    11.33 )-----------( 450      ( 11 Mar 2024 05:00 )             31.0       03-11    137  |  108  |  13  ----------------------------<  282<H>  3.8   |  24  |  0.68    Ca    8.5      11 Mar 2024 05:00  Phos  3.5     03-11  Mg     1.8     03-11    TPro  7.1  /  Alb  2.3<L>  /  TBili  0.3  /  DBili  x   /  AST  42<H>  /  ALT  89<H>  /  AlkPhos  610<H>  03-11    PT/INR - ( 11 Mar 2024 11:15 )   PT: 12.7 sec;   INR: 1.12 ratio      PTT - ( 11 Mar 2024 11:15 )  PTT:41.8 sec        CAPILLARY BLOOD GLUCOSE  POCT Blood Glucose.: 108 mg/dL (11 Mar 2024 07:44)        MEDICATIONS  (STANDING):  cefepime   IVPB 1000 milliGRAM(s) IV Intermittent every 8 hours  dextrose 5% + sodium chloride 0.45%. 1000 milliLiter(s) (110 mL/Hr) IV Continuous <Continuous>  influenza   Vaccine 0.5 milliLiter(s) IntraMuscular once  vancomycin  IVPB 1000 milliGRAM(s) IV Intermittent every 12 hours    MEDICATIONS  (PRN):  acetaminophen     Tablet .. 650 milliGRAM(s) Oral every 6 hours PRN Temp greater or equal to 38C (100.4F), Mild Pain (1 - 3)  aluminum hydroxide/magnesium hydroxide/simethicone Suspension 30 milliLiter(s) Oral every 4 hours PRN Dyspepsia  ketorolac   Injectable 15 milliGRAM(s) IV Push every 8 hours PRN Moderate Pain (4 - 6)  melatonin 3 milliGRAM(s) Oral at bedtime PRN Insomnia  morphine  - Injectable 2 milliGRAM(s) IV Push every 6 hours PRN Severe Pain (7 - 10)  ondansetron Injectable 4 milliGRAM(s) IV Push every 8 hours PRN Nausea and/or Vomiting        RADIOLOGY & ADDITIONAL TESTS:    3/10/24: CT Abdomen and Pelvis w/ IV Cont (03.10.24 @ 22:44) New 5.3 cm complex collection in the region of the right obturator   externus muscle medially, most consistent with abscess.    There is asymmetric mural thickening of the urinary bladder with perivesical fat stranding, likely reactively inflamed.    Given the patient's prior history of cervical cancer, follow-up imaging is recommended after resolution of symptoms or within 6-8 weeks.    1.2 cm indeterminate right hepatic lobe hyperdense focus. This can be further characterized with nonemergent hepatic protocol MRI.        MICROBIOLOGY DATA:    Urinalysis + Microscopic Examination (03.10.24 @ 21:15)   pH Urine: 6.0  Urine Appearance: Clear  Color: Yellow  Specific Gravity: 1.010  Protein, Urine: Negative mg/dL  Glucose Qualitative, Urine: Negative mg/dL  Ketone - Urine: Negative mg/dL  Blood, Urine: Negative  Bilirubin: Negative  Urobilinogen: 0.2 mg/dL  Leukocyte Esterase Concentration: Small  Nitrite: Negative  White Blood Cell - Urine: 5 /HPF  Red Blood Cell - Urine: None Seen /HPF  Bacteria: Occasional /HPF  Squamous Epithelial Cells: Present: few  Comment - Urine: Occasional renal tubular epithelial cells present

## 2024-03-11 NOTE — H&P ADULT - PROBLEM SELECTOR PLAN 3
SCDs for now, hold for IR procedure    IMPROVE VTE Individual Risk Assessment    RISK                                                                Points  [  ] Previous VTE                                                  3  [  ] Thrombophilia                                               2  [  ] Lower limb paralysis                                      2        (unable to hold up >15 seconds)    [  ] Current Cancer                                              2         (within 6 months)  [ x ] Immobilization > 24 hrs                                1  [  ] ICU/CCU stay > 24 hours                              1  [  x] Age > 60                                                      1  IMPROVE VTE Score _____2____

## 2024-03-11 NOTE — PATIENT PROFILE ADULT - FALL HARM RISK - HARM RISK INTERVENTIONS

## 2024-03-11 NOTE — H&P ADULT - NSHPREVIEWOFSYSTEMS_GEN_ALL_CORE
CONSTITUTIONAL:  No fevers or chills, poor appetite, good general state  EYES/ENT:  No visual changes;  No vertigo or throat pain   NECK:  No neck pain or stiffness  RESPIRATORY:  No cough, wheezing, hemoptysis; No shortness of breath  CARDIOVASCULAR:  No chest pain or palpitations  GASTROINTESTINAL:  No abdominal pain. + nausea without vomiting or hematemesis; No diarrhea or constipation. No melena or hematochezia.  GENITOURINARY:  No dysuria, frequency or hematuria  NEUROLOGICAL:  No HA, no numbness or LE weakness  MSK: +R sided groin pain   SKIN:  No itching, no skin rash

## 2024-03-11 NOTE — CONSULT NOTE ADULT - SUBJECTIVE AND OBJECTIVE BOX
Patient is a 58y old  Female who presents with a chief complaint of R groin abscess (11 Mar 2024 02:41)      History of Present Illness:  Reason for Admission: R groin abscess  History of Present Illness:   She is a 58-year-old woman with history of cervical cancer s/p chemo in 2013, s/p hysterectomy in 2015, with a recent negative PET scan 3 weeks coming in with 2 months of right groin pain that has been getting worse. 1 month ago she saw her gynecologist and urologist for the pain. Pap smear and biopsies were negative.  UA was negative at that time. She was told to continue to take Motrin for symptomatic relief, which she has been taking every 8 hours without relief.  Last week the pain got worse so she went to urgent care where she was given Keflex 500 mg.  She has been taking the Keflex since March 6. The day before admission the pain became worse and so she came to the emergency room today. It gets worse when she bears weight and walks.  In the ER CT was positive for a 5.3 cm complex collection in the right obturator externus medially, most consistent with an abscess.  Surgery has seen her and deferred to IR for drainage in the morning.  She was started on IV clindamycin.     Review of systems is positive for 2 days of loss of appetite, nausea without vomiting, chills 4 days ago.  She has been afebrile.  She denies chest pain, shortness of breath, and headache.    Awake, alert, comfortable in bed in NAD  INTERVAL HPI/OVERNIGHT EVENTS:  T(C): 36.9 (03-11-24 @ 09:18), Max: 37.2 (03-11-24 @ 01:00)  HR: 75 (03-11-24 @ 09:18) (72 - 85)  BP: 128/82 (03-11-24 @ 09:18) (112/71 - 163/89)  RR: 17 (03-11-24 @ 09:18) (16 - 18)  SpO2: 96% (03-11-24 @ 09:18) (96% - 99%)  Wt(kg): --  I&O's Summary      PAST MEDICAL & SURGICAL HISTORY:  Cervical cancer  cervical cancer /hysterectomy          SOCIAL HISTORY  Alcohol:  Tobacco:  Illicit substance use:      FAMILY HISTORY:      LABS:                        9.8    11.33 )-----------( 450      ( 11 Mar 2024 05:00 )             31.0     03-11    137  |  108  |  13  ----------------------------<  282<H>  3.8   |  24  |  0.68    Ca    8.5      11 Mar 2024 05:00  Phos  3.5     03-11  Mg     1.8     03-11    TPro  7.1  /  Alb  2.3<L>  /  TBili  0.3  /  DBili  x   /  AST  42<H>  /  ALT  89<H>  /  AlkPhos  610<H>  03-11      Urinalysis Basic - ( 11 Mar 2024 05:00 )    Color: x / Appearance: x / SG: x / pH: x  Gluc: 282 mg/dL / Ketone: x  / Bili: x / Urobili: x   Blood: x / Protein: x / Nitrite: x   Leuk Esterase: x / RBC: x / WBC x   Sq Epi: x / Non Sq Epi: x / Bacteria: x      CAPILLARY BLOOD GLUCOSE      POCT Blood Glucose.: 108 mg/dL (11 Mar 2024 07:44)        Urinalysis Basic - ( 11 Mar 2024 05:00 )    Color: x / Appearance: x / SG: x / pH: x  Gluc: 282 mg/dL / Ketone: x  / Bili: x / Urobili: x   Blood: x / Protein: x / Nitrite: x   Leuk Esterase: x / RBC: x / WBC x   Sq Epi: x / Non Sq Epi: x / Bacteria: x        MEDICATIONS  (STANDING):  cefepime   IVPB 1000 milliGRAM(s) IV Intermittent every 8 hours  dextrose 5% + sodium chloride 0.45%. 1000 milliLiter(s) (110 mL/Hr) IV Continuous <Continuous>  influenza   Vaccine 0.5 milliLiter(s) IntraMuscular once  vancomycin  IVPB 1000 milliGRAM(s) IV Intermittent every 12 hours    MEDICATIONS  (PRN):  acetaminophen     Tablet .. 650 milliGRAM(s) Oral every 6 hours PRN Temp greater or equal to 38C (100.4F), Mild Pain (1 - 3)  aluminum hydroxide/magnesium hydroxide/simethicone Suspension 30 milliLiter(s) Oral every 4 hours PRN Dyspepsia  ketorolac   Injectable 15 milliGRAM(s) IV Push every 8 hours PRN Moderate Pain (4 - 6)  melatonin 3 milliGRAM(s) Oral at bedtime PRN Insomnia  morphine  - Injectable 2 milliGRAM(s) IV Push every 6 hours PRN Severe Pain (7 - 10)  ondansetron Injectable 4 milliGRAM(s) IV Push every 8 hours PRN Nausea and/or Vomiting      REVIEW OF SYSTEMS:  CONSTITUTIONAL: No fever, weight loss, or fatigue  EYES: No eye pain, visual disturbances, or discharge  ENMT:  No difficulty hearing, tinnitus, vertigo; No sinus or throat pain  NECK: No pain or stiffness  RESPIRATORY: No cough, wheezing, chills or hemoptysis; No shortness of breath  CARDIOVASCULAR: No chest pain, palpitations, dizziness, or leg swelling  GASTROINTESTINAL: + abdominal and right groin  pain. No nausea, vomiting, or hematemesis; No diarrhea or constipation. No melena or hematochezia.  GENITOURINARY: No dysuria, frequency, hematuria, or incontinence  NEUROLOGICAL: No headaches, memory loss, loss of strength, numbness, or tremors  SKIN: No itching, burning, rashes, or lesions   LYMPH NODES: No enlarged glands  ENDOCRINE: No heat or cold intolerance; No hair loss  MUSCULOSKELETAL: No joint pain or swelling; No muscle, back, or extremity pain  PSYCHIATRIC: No depression, anxiety, mood swings, or difficulty sleeping  HEME/LYMPH: No easy bruising, or bleeding gums  ALLERY AND IMMUNOLOGIC: No hives or eczema    PHYSICAL EXAM:  GENERAL: NAD, well-groomed, well-developed  HEAD:  Atraumatic, Normocephalic  EYES: EOMI, PERRLA, conjunctiva and sclera clear  ENMT: No tonsillar erythema, exudates, or enlargement; Moist mucous membranes, Good dentition, No lesions  NECK: Supple, No JVD, Normal thyroid  NERVOUS SYSTEM:  Alert & Oriented X3, Good concentration; Motor Strength 5/5 B/L upper and lower extremities; DTRs 2+ intact and symmetric  CHEST/LUNG: Clear to percussion bilaterally; No rales, rhonchi, wheezing, or rubs  HEART: Regular rate and rhythm; No murmurs, rubs, or gallops  ABDOMEN: Soft, right groin tenderness, Nondistended; Bowel sounds present  EXTREMITIES:  2+ Peripheral Pulses, No clubbing, cyanosis, or edema  LYMPH: No lymphadenopathy noted  SKIN: No rashes or lesions    RADIOLOGY & ADDITIONAL TESTS:  < from: CT Abdomen and Pelvis w/ IV Cont (03.10.24 @ 22:44) >  IMPRESSION:  New 5.3 cm complex collection in the region of the right obturator   externus muscle medially, most consistent with abscess.    There is asymmetric mural thickening of the urinary bladder with   perivesical fat stranding, likely reactively inflamed.    Given the patient's prior history of cervical cancer, follow-up imaging   is recommended after resolution of symptoms or within 6-8 weeks.    1.2 cm indeterminate right hepatic lobe hyperdense focus. This can be   further characterized with nonemergent hepatic protocol MRI.    < end of copied text >    Imaging Personally Reviewed:  [x ] YES  [ ] NO    Consultant(s) Notes Reviewed:  [ x] YES  [ ] NO        Care Discussed with Consultants/Other Providers [ x] YES  [ ] NO

## 2024-03-12 ENCOUNTER — RESULT REVIEW (OUTPATIENT)
Age: 59
End: 2024-03-12

## 2024-03-12 DIAGNOSIS — K76.9 LIVER DISEASE, UNSPECIFIED: ICD-10-CM

## 2024-03-12 DIAGNOSIS — Z02.9 ENCOUNTER FOR ADMINISTRATIVE EXAMINATIONS, UNSPECIFIED: ICD-10-CM

## 2024-03-12 LAB
ANION GAP SERPL CALC-SCNC: 7 MMOL/L — SIGNIFICANT CHANGE UP (ref 5–17)
BUN SERPL-MCNC: 20 MG/DL — HIGH (ref 7–18)
CALCIUM SERPL-MCNC: 9 MG/DL — SIGNIFICANT CHANGE UP (ref 8.4–10.5)
CHLORIDE SERPL-SCNC: 110 MMOL/L — HIGH (ref 96–108)
CO2 SERPL-SCNC: 23 MMOL/L — SIGNIFICANT CHANGE UP (ref 22–31)
CREAT SERPL-MCNC: 1.14 MG/DL — SIGNIFICANT CHANGE UP (ref 0.5–1.3)
CULTURE RESULTS: NO GROWTH — SIGNIFICANT CHANGE UP
EGFR: 55 ML/MIN/1.73M2 — LOW
GLUCOSE SERPL-MCNC: 149 MG/DL — HIGH (ref 70–99)
GRAM STN FLD: ABNORMAL
HCT VFR BLD CALC: 32 % — LOW (ref 34.5–45)
HCV AB S/CO SERPL IA: 0.17 S/CO — SIGNIFICANT CHANGE UP (ref 0–0.99)
HCV AB SERPL-IMP: SIGNIFICANT CHANGE UP
HGB BLD-MCNC: 10.5 G/DL — LOW (ref 11.5–15.5)
MCHC RBC-ENTMCNC: 29.3 PG — SIGNIFICANT CHANGE UP (ref 27–34)
MCHC RBC-ENTMCNC: 32.8 GM/DL — SIGNIFICANT CHANGE UP (ref 32–36)
MCV RBC AUTO: 89.4 FL — SIGNIFICANT CHANGE UP (ref 80–100)
NRBC # BLD: 0 /100 WBCS — SIGNIFICANT CHANGE UP (ref 0–0)
PLATELET # BLD AUTO: 524 K/UL — HIGH (ref 150–400)
POTASSIUM SERPL-MCNC: 3.9 MMOL/L — SIGNIFICANT CHANGE UP (ref 3.5–5.3)
POTASSIUM SERPL-SCNC: 3.9 MMOL/L — SIGNIFICANT CHANGE UP (ref 3.5–5.3)
RBC # BLD: 3.58 M/UL — LOW (ref 3.8–5.2)
RBC # FLD: 13 % — SIGNIFICANT CHANGE UP (ref 10.3–14.5)
SODIUM SERPL-SCNC: 140 MMOL/L — SIGNIFICANT CHANGE UP (ref 135–145)
SPECIMEN SOURCE: SIGNIFICANT CHANGE UP
SPECIMEN SOURCE: SIGNIFICANT CHANGE UP
WBC # BLD: 11 K/UL — HIGH (ref 3.8–10.5)
WBC # FLD AUTO: 11 K/UL — HIGH (ref 3.8–10.5)

## 2024-03-12 PROCEDURE — 88305 TISSUE EXAM BY PATHOLOGIST: CPT | Mod: 26

## 2024-03-12 RX ORDER — PANTOPRAZOLE SODIUM 20 MG/1
40 TABLET, DELAYED RELEASE ORAL
Refills: 0 | Status: DISCONTINUED | OUTPATIENT
Start: 2024-03-12 | End: 2024-03-22

## 2024-03-12 RX ADMIN — Medication 15 MILLIGRAM(S): at 17:10

## 2024-03-12 RX ADMIN — MORPHINE SULFATE 2 MILLIGRAM(S): 50 CAPSULE, EXTENDED RELEASE ORAL at 19:14

## 2024-03-12 RX ADMIN — CEFEPIME 100 MILLIGRAM(S): 1 INJECTION, POWDER, FOR SOLUTION INTRAMUSCULAR; INTRAVENOUS at 21:00

## 2024-03-12 RX ADMIN — Medication 15 MILLIGRAM(S): at 08:42

## 2024-03-12 RX ADMIN — CEFEPIME 100 MILLIGRAM(S): 1 INJECTION, POWDER, FOR SOLUTION INTRAMUSCULAR; INTRAVENOUS at 05:11

## 2024-03-12 RX ADMIN — CEFEPIME 100 MILLIGRAM(S): 1 INJECTION, POWDER, FOR SOLUTION INTRAMUSCULAR; INTRAVENOUS at 13:50

## 2024-03-12 RX ADMIN — MORPHINE SULFATE 2 MILLIGRAM(S): 50 CAPSULE, EXTENDED RELEASE ORAL at 18:44

## 2024-03-12 RX ADMIN — Medication 15 MILLIGRAM(S): at 17:40

## 2024-03-12 RX ADMIN — Medication 250 MILLIGRAM(S): at 05:11

## 2024-03-12 RX ADMIN — Medication 250 MILLIGRAM(S): at 17:10

## 2024-03-12 RX ADMIN — Medication 15 MILLIGRAM(S): at 08:12

## 2024-03-12 NOTE — PHYSICAL THERAPY INITIAL EVALUATION ADULT - PERTINENT HX OF CURRENT PROBLEM, REHAB EVAL
Pt presents s/p aspiration of mass on Rt Obturator & pain in the Rt groin. Additional past medical history as detailed below by medical team.

## 2024-03-12 NOTE — PROGRESS NOTE ADULT - SUBJECTIVE AND OBJECTIVE BOX
Gyn ONC Progress Note   HD#2 Right groin abscess with hx/o pain x 2 months.   Hx/o Stage IIB SCC Cervical Cancer s/p chemo/ radiation  2013/14.     Subjective:   Pt seen and examined at bedside. No events overnight. Pt states that groin area felt warm last night.   Pain still present to touch and ambulation. Pt states she is able to ambulate after pain meds.   Tolerating regular diet.   Pt denies fever, chills, chest pain, SOB, nausea, vomiting, lightheadedness, dizziness.      Objective:  T(F): 98.2 (03-12-24 @ 05:09), Max: 98.6 (03-11-24 @ 13:20)  HR: 81 (03-12-24 @ 05:09) (71 - 84)  BP: 105/67 (03-12-24 @ 05:09) (105/67 - 140/79)  RR: 18 (03-12-24 @ 05:09) (18 - 21)  SpO2: 96% (03-12-24 @ 05:09) (96% - 99%)  Wt(kg): --  I&O's Summary    11 Mar 2024 07:01  -  12 Mar 2024 07:00  --------------------------------------------------------  IN: 0 mL / OUT: 250 mL / NET: -250 mL      CAPILLARY BLOOD GLUCOSE          MEDICATIONS  (STANDING):  cefepime   IVPB 1000 milliGRAM(s) IV Intermittent every 8 hours  influenza   Vaccine 0.5 milliLiter(s) IntraMuscular once  vancomycin  IVPB 1000 milliGRAM(s) IV Intermittent every 12 hours    MEDICATIONS  (PRN):  acetaminophen     Tablet .. 650 milliGRAM(s) Oral every 6 hours PRN Temp greater or equal to 38C (100.4F), Mild Pain (1 - 3)  aluminum hydroxide/magnesium hydroxide/simethicone Suspension 30 milliLiter(s) Oral every 4 hours PRN Dyspepsia  ketorolac   Injectable 15 milliGRAM(s) IV Push every 8 hours PRN Moderate Pain (4 - 6)  melatonin 3 milliGRAM(s) Oral at bedtime PRN Insomnia  morphine  - Injectable 2 milliGRAM(s) IV Push every 6 hours PRN Severe Pain (7 - 10)  ondansetron Injectable 4 milliGRAM(s) IV Push every 8 hours PRN Nausea and/or Vomiting      Physical Exam: Pt is alert and Ox3.   Constitutional: NAD  CV: normal S1, S2  Lungs: CTA b/l   Abdomen: soft NT  right groin no erythema at this time, tender to palpation, palpable tender inferior node.   Inner right thigh:  area of induration decreased from marked area  Extremities:  ROM present throughout but decreased to right hip area due to pain.     LABS:  03-11                        9.8    11.33 )-----------( 450      ( 11 Mar 2024 05:00 )             31.0       137    |  108    |  13     ----------------------------<  282<H>  3.8     |  24     |  0.68     Ca    8.5        11 Mar 2024 05:00  Phos  3.5       03-11  Mg     1.8       03-11    TPro  7.1    /  Alb  2.3<L>  /  TBili  0.3    /  DBili  x      /  AST  42<H>  /  ALT  89<H>  /  AlkPhos  610<H>  03-11        PT/INR - ( 11 Mar 2024 11:15 )   PT: 12.7 sec;   INR: 1.12 ratio         PTT - ( 11 Mar 2024 11:15 )  PTT:41.8 sec  Urinalysis Basic - ( 11 Mar 2024 05:00 )    Color: x / Appearance: x / SG: x / pH: x  Gluc: 282 mg/dL / Ketone: x  / Bili: x / Urobili: x   Blood: x / Protein: x / Nitrite: x   Leuk Esterase: x / RBC: x / WBC x   Sq Epi: x / Non Sq Epi: x / Bacteria: x    Abcess -   Gram stain: gram positive  cocci in pairs.

## 2024-03-12 NOTE — PHYSICAL THERAPY INITIAL EVALUATION ADULT - NSPTDISCHREC_GEN_A_CORE
Pain is current limiting factor in pt participation. After anticipated decrease in pain during hospital course, pt will see benefit from the skilled management of a HPT./Home PT

## 2024-03-12 NOTE — PHYSICAL THERAPY INITIAL EVALUATION ADULT - GAIT DEVIATIONS NOTED, PT EVAL
decreased ina/increased time in double stance/decreased velocity of limb motion/decreased step length/decreased stride length/decreased swing-to-stance ratio/decreased weight-shifting ability

## 2024-03-12 NOTE — PROGRESS NOTE ADULT - ASSESSMENT
She is a 58-year-old woman with a remote history of uterine cancer who presents with right-sided groin abscess.  IR will drain the abscess during this admission. She is a 58-year-old woman with a remote history of uterine cancer who presents with right-sided groin abscess. s/p aspiration by IR, 1cc purulent fluid 3/11. Changed Clindamycin to IV Vancomycin and Cefepime. Pending PT consult

## 2024-03-12 NOTE — PROVIDER CONTACT NOTE (CRITICAL VALUE NOTIFICATION) - TEST AND RESULT REPORTED:
Abscess culture from right groin from 3/11: no polymorphonuclear leucocytes seen per low power field. Rare gram positive cocci in pairs seen per oil power field.

## 2024-03-12 NOTE — PROGRESS NOTE ADULT - SUBJECTIVE AND OBJECTIVE BOX
NP Note discussed with  primary attending    Patient is a 58y old  Female who presents with a chief complaint of R groin abscess (12 Mar 2024 09:21)      INTERVAL HPI/OVERNIGHT EVENTS: no new complaints    MEDICATIONS  (STANDING):  cefepime   IVPB 1000 milliGRAM(s) IV Intermittent every 8 hours  influenza   Vaccine 0.5 milliLiter(s) IntraMuscular once  vancomycin  IVPB 1000 milliGRAM(s) IV Intermittent every 12 hours    MEDICATIONS  (PRN):  acetaminophen     Tablet .. 650 milliGRAM(s) Oral every 6 hours PRN Temp greater or equal to 38C (100.4F), Mild Pain (1 - 3)  aluminum hydroxide/magnesium hydroxide/simethicone Suspension 30 milliLiter(s) Oral every 4 hours PRN Dyspepsia  ketorolac   Injectable 15 milliGRAM(s) IV Push every 8 hours PRN Moderate Pain (4 - 6)  melatonin 3 milliGRAM(s) Oral at bedtime PRN Insomnia  morphine  - Injectable 2 milliGRAM(s) IV Push every 6 hours PRN Severe Pain (7 - 10)  ondansetron Injectable 4 milliGRAM(s) IV Push every 8 hours PRN Nausea and/or Vomiting      __________________________________________________  REVIEW OF SYSTEMS:    CONSTITUTIONAL: No fever,   EYES: no acute visual disturbances  NECK: No pain or stiffness  RESPIRATORY: No cough; No shortness of breath  CARDIOVASCULAR: No chest pain, no palpitations  GASTROINTESTINAL: No pain. No nausea or vomiting; No diarrhea   NEUROLOGICAL: No headache or numbness, no tremors  MUSCULOSKELETAL: No joint pain, no muscle pain  GENITOURINARY: no dysuria, no frequency, no hesitancy  PSYCHIATRY: no depression , no anxiety  ALL OTHER  ROS negative        Vital Signs Last 24 Hrs  T(C): 36.8 (12 Mar 2024 05:09), Max: 37 (11 Mar 2024 13:20)  T(F): 98.2 (12 Mar 2024 05:09), Max: 98.6 (11 Mar 2024 13:20)  HR: 81 (12 Mar 2024 05:09) (71 - 84)  BP: 105/67 (12 Mar 2024 05:09) (105/67 - 140/79)  BP(mean): 93 (11 Mar 2024 18:07) (90 - 103)  RR: 18 (12 Mar 2024 05:09) (18 - 21)  SpO2: 96% (12 Mar 2024 05:09) (96% - 99%)    Parameters below as of 12 Mar 2024 05:09  Patient On (Oxygen Delivery Method): room air        ________________________________________________  PHYSICAL EXAM:  GENERAL: NAD  HEENT: Normocephalic;  conjunctivae and sclerae clear; moist mucous membranes;   NECK : supple  CHEST/LUNG: Diminished to auscultation bilaterally with good air entry   HEART: S1 S2  regular; no murmurs, gallops or rubs  ABDOMEN: Soft, Nontender, Nondistended; Bowel sounds present  EXTREMITIES: no cyanosis; no edema; no calf tenderness  SKIN: warm and dry; no rash  NERVOUS SYSTEM:  Awake and alert; Oriented  to place, person and time ; no new deficits    _________________________________________________  LABS:                        9.8    11.33 )-----------( 450      ( 11 Mar 2024 05:00 )             31.0     03-11    137  |  108  |  13  ----------------------------<  282<H>  3.8   |  24  |  0.68    Ca    8.5      11 Mar 2024 05:00  Phos  3.5     03-11  Mg     1.8     03-11    TPro  7.1  /  Alb  2.3<L>  /  TBili  0.3  /  DBili  x   /  AST  42<H>  /  ALT  89<H>  /  AlkPhos  610<H>  03-11    PT/INR - ( 11 Mar 2024 11:15 )   PT: 12.7 sec;   INR: 1.12 ratio         PTT - ( 11 Mar 2024 11:15 )  PTT:41.8 sec  Urinalysis Basic - ( 11 Mar 2024 05:00 )    Color: x / Appearance: x / SG: x / pH: x  Gluc: 282 mg/dL / Ketone: x  / Bili: x / Urobili: x   Blood: x / Protein: x / Nitrite: x   Leuk Esterase: x / RBC: x / WBC x   Sq Epi: x / Non Sq Epi: x / Bacteria: x      CAPILLARY BLOOD GLUCOSE            RADIOLOGY & ADDITIONAL TESTS:    < from: CT Aspiration Abscess Hematoma, Cyst (03.11.24 @ 16:59) >  IMPRESSION: SUCCESSFUL CT-GUIDED ASPIRATION OF RIGHT GROIN COLLECTION, AS   DESCRIBED ABOVE.    < end of copied text >      Imaging Personally Reviewed:  YES/NO    Consultant(s) Notes Reviewed:   YES/ No    Care Discussed with Consultants :     Plan of care was discussed with patient and /or primary care giver; all questions and concerns were addressed and care was aligned with patient's wishes.     NP Note discussed with  primary attending    Patient is a 58y old  Female who presents with a chief complaint of R groin abscess (12 Mar 2024 09:21)      INTERVAL HPI/OVERNIGHT EVENTS: no new complaints    MEDICATIONS  (STANDING):  cefepime   IVPB 1000 milliGRAM(s) IV Intermittent every 8 hours  influenza   Vaccine 0.5 milliLiter(s) IntraMuscular once  vancomycin  IVPB 1000 milliGRAM(s) IV Intermittent every 12 hours    MEDICATIONS  (PRN):  acetaminophen     Tablet .. 650 milliGRAM(s) Oral every 6 hours PRN Temp greater or equal to 38C (100.4F), Mild Pain (1 - 3)  aluminum hydroxide/magnesium hydroxide/simethicone Suspension 30 milliLiter(s) Oral every 4 hours PRN Dyspepsia  ketorolac   Injectable 15 milliGRAM(s) IV Push every 8 hours PRN Moderate Pain (4 - 6)  melatonin 3 milliGRAM(s) Oral at bedtime PRN Insomnia  morphine  - Injectable 2 milliGRAM(s) IV Push every 6 hours PRN Severe Pain (7 - 10)  ondansetron Injectable 4 milliGRAM(s) IV Push every 8 hours PRN Nausea and/or Vomiting      __________________________________________________  REVIEW OF SYSTEMS:    CONSTITUTIONAL: No fever,   EYES: no acute visual disturbances  NECK: No pain or stiffness  RESPIRATORY: No cough; No shortness of breath  CARDIOVASCULAR: No chest pain, no palpitations  GASTROINTESTINAL: No pain. No nausea or vomiting; No diarrhea   NEUROLOGICAL: No headache or numbness, no tremors  MUSCULOSKELETAL: ROM present throughout but decreased to right hip area due to pain.   GENITOURINARY: no dysuria, no frequency, no hesitancy,  PSYCHIATRY: no depression , no anxiety  ALL OTHER  ROS negative        Vital Signs Last 24 Hrs  T(C): 36.8 (12 Mar 2024 05:09), Max: 37 (11 Mar 2024 13:20)  T(F): 98.2 (12 Mar 2024 05:09), Max: 98.6 (11 Mar 2024 13:20)  HR: 81 (12 Mar 2024 05:09) (71 - 84)  BP: 105/67 (12 Mar 2024 05:09) (105/67 - 140/79)  BP(mean): 93 (11 Mar 2024 18:07) (90 - 103)  RR: 18 (12 Mar 2024 05:09) (18 - 21)  SpO2: 96% (12 Mar 2024 05:09) (96% - 99%)    Parameters below as of 12 Mar 2024 05:09  Patient On (Oxygen Delivery Method): room air        ________________________________________________  PHYSICAL EXAM:  GENERAL: NAD  HEENT: Normocephalic;  conjunctivae and sclerae clear; moist mucous membranes;   NECK : supple  CHEST/LUNG: Diminished to auscultation bilaterally with good air entry   HEART: S1 S2  regular; no murmurs, gallops or rubs  ABDOMEN: Soft, Nontender, Nondistended; Bowel sounds present  EXTREMITIES:R right inner thigh without erythema noted on my exam, previously marked area of erythema with marker, bandaid   SKIN: warm and dry; no rash  NERVOUS SYSTEM:  Awake and alert; Oriented  to place, person and time ; no new deficits    _________________________________________________  LABS:                        9.8    11.33 )-----------( 450      ( 11 Mar 2024 05:00 )             31.0     03-11    137  |  108  |  13  ----------------------------<  282<H>  3.8   |  24  |  0.68    Ca    8.5      11 Mar 2024 05:00  Phos  3.5     03-11  Mg     1.8     03-11    TPro  7.1  /  Alb  2.3<L>  /  TBili  0.3  /  DBili  x   /  AST  42<H>  /  ALT  89<H>  /  AlkPhos  610<H>  03-11    PT/INR - ( 11 Mar 2024 11:15 )   PT: 12.7 sec;   INR: 1.12 ratio         PTT - ( 11 Mar 2024 11:15 )  PTT:41.8 sec  Urinalysis Basic - ( 11 Mar 2024 05:00 )    Color: x / Appearance: x / SG: x / pH: x  Gluc: 282 mg/dL / Ketone: x  / Bili: x / Urobili: x   Blood: x / Protein: x / Nitrite: x   Leuk Esterase: x / RBC: x / WBC x   Sq Epi: x / Non Sq Epi: x / Bacteria: x      CAPILLARY BLOOD GLUCOSE            RADIOLOGY & ADDITIONAL TESTS:    < from: CT Aspiration Abscess Hematoma, Cyst (03.11.24 @ 16:59) >  IMPRESSION: SUCCESSFUL CT-GUIDED ASPIRATION OF RIGHT GROIN COLLECTION, AS   DESCRIBED ABOVE.    < end of copied text >      Imaging Personally Reviewed:  YES/NO    Consultant(s) Notes Reviewed:   YES/ No    Care Discussed with Consultants :     Plan of care was discussed with patient and /or primary care giver; all questions and concerns were addressed and care was aligned with patient's wishes.

## 2024-03-12 NOTE — PHYSICAL THERAPY INITIAL EVALUATION ADULT - GENERAL OBSERVATIONS, REHAB EVAL
Consult received, chart reviewed. Patient received supine in bed, NAD, + IV, + alarm, bandages C/D/I. Patient agreed to EVALUATION from Physical Therapist. Consult received, chart reviewed. Patient received supine in bed, NAD, + IV, + alarm,. Patient agreed to EVALUATION from Physical Therapist.

## 2024-03-12 NOTE — PHYSICAL THERAPY INITIAL EVALUATION ADULT - DIAGNOSIS, PT EVAL
(ICF Model) Pt. present w/deficits in Body Structures/Function (Impairments), incl: Strength, Balance, & Pain, leading to deficits in performing the below noted Activities (Limitations).

## 2024-03-12 NOTE — PROGRESS NOTE ADULT - PROBLEM SELECTOR PLAN 3
SCDs for now, hold for IR procedure    IMPROVE VTE Individual Risk Assessment    RISK                                                                Points  [  ] Previous VTE                                                  3  [  ] Thrombophilia                                               2  [  ] Lower limb paralysis                                      2        (unable to hold up >15 seconds)    [  ] Current Cancer                                              2         (within 6 months)  [ x ] Immobilization > 24 hrs                                1  [  ] ICU/CCU stay > 24 hours                              1  [  x] Age > 60                                                      1  IMPROVE VTE Score _____2____ dvt: lovenox  GI: Protonix nonemergent hepatic protocol MRI outpatient to further right hepatic lobe density

## 2024-03-12 NOTE — PROGRESS NOTE ADULT - SUBJECTIVE AND OBJECTIVE BOX
Patient is seen and examined at the bed side, is afebrile. She is s/p IR guided aspiration of the abscess on 3/12 and tolerated the procedure well.       REVIEW OF SYSTEMS: All other review systems are negative      ALLERGIES: No Known Allergies      Vital Signs Last 24 Hrs  T(C): 37.1 (12 Mar 2024 13:45), Max: 37.1 (12 Mar 2024 13:45)  T(F): 98.8 (12 Mar 2024 13:45), Max: 98.8 (12 Mar 2024 13:45)  HR: 99 (12 Mar 2024 18:24) (81 - 99)  BP: 163/93 (12 Mar 2024 18:24) (105/67 - 163/93)  BP(mean): --  RR: 17 (12 Mar 2024 13:45) (17 - 18)  SpO2: 95% (12 Mar 2024 18:24) (95% - 99%)    Parameters below as of 12 Mar 2024 18:24  Patient On (Oxygen Delivery Method): room air      PHYSICAL EXAM:  GENERAL: Not in acute distress  CVS: s1 and s2 present  RESP: Not using accessory muscles  GI: Right Groin redness resolving but very tender to touch  EXT: No pedal edema  CNS: Awake and Alert      LABS:                          10.5   11.00 )-----------( 524      ( 12 Mar 2024 15:41 )             32.0                           9.8    11.33 )-----------( 450      ( 11 Mar 2024 05:00 )             31.0         03-12    140  |  110<H>  |  20<H>  ----------------------------<  149<H>  3.9   |  23  |  1.14    Ca    9.0      12 Mar 2024 15:41  Phos  3.5     03-11  Mg     1.8     03-11    TPro  7.1  /  Alb  2.3<L>  /  TBili  0.3  /  DBili  x   /  AST  42<H>  /  ALT  89<H>  /  AlkPhos  610<H>  03-11    03-11    137  |  108  |  13  ----------------------------<  282<H>  3.8   |  24  |  0.68    Ca    8.5      11 Mar 2024 05:00  Phos  3.5     03-11  Mg     1.8     03-11    TPro  7.1  /  Alb  2.3<L>  /  TBili  0.3  /  DBili  x   /  AST  42<H>  /  ALT  89<H>  /  AlkPhos  610<H>  03-11    PT/INR - ( 11 Mar 2024 11:15 )   PT: 12.7 sec;   INR: 1.12 ratio      PTT - ( 11 Mar 2024 11:15 )  PTT:41.8 sec        CAPILLARY BLOOD GLUCOSE  POCT Blood Glucose.: 108 mg/dL (11 Mar 2024 07:44)      MEDICATIONS  (STANDING):    cefepime   IVPB 1000 milliGRAM(s) IV Intermittent every 8 hours  influenza   Vaccine 0.5 milliLiter(s) IntraMuscular once  pantoprazole    Tablet 40 milliGRAM(s) Oral before breakfast  vancomycin  IVPB 1000 milliGRAM(s) IV Intermittent every 12 hours      RADIOLOGY & ADDITIONAL TESTS:    3/10/24: CT Abdomen and Pelvis w/ IV Cont (03.10.24 @ 22:44) New 5.3 cm complex collection in the region of the right obturator   externus muscle medially, most consistent with abscess.    There is asymmetric mural thickening of the urinary bladder with perivesical fat stranding, likely reactively inflamed.    Given the patient's prior history of cervical cancer, follow-up imaging is recommended after resolution of symptoms or within 6-8 weeks.    1.2 cm indeterminate right hepatic lobe hyperdense focus. This can be further characterized with nonemergent hepatic protocol MRI.        MICROBIOLOGY DATA:    Culture - Abscess with Gram Stain (03.11.24 @ 16:50)   Gram Stain:   No polymorphonuclear leukocytes seen per low power field   Rare Gram positive cocci in pairs seen per oil power field  Specimen Source: .Abscess right groin  Culture Results:   Moderate Streptococcus agalactiae (Group B) isolated   Group B streptococci are susceptible to ampicillin,   penicillin and cefazolin, but may be resistant to   erythromycin and clindamycin.    MRSA/MSSA PCR (03.11.24 @ 09:07)   MRSA PCR Result.: NotDetec:     Culture - Urine (03.10.24 @ 21:15)   Specimen Source: Clean Catch Clean Catch (Midstream)  Culture Results:  No growth    Urinalysis + Microscopic Examination (03.10.24 @ 21:15)   pH Urine: 6.0  Urine Appearance: Clear  Color: Yellow  Specific Gravity: 1.010  Protein, Urine: Negative mg/dL  Glucose Qualitative, Urine: Negative mg/dL  Ketone - Urine: Negative mg/dL  Blood, Urine: Negative  Bilirubin: Negative  Urobilinogen: 0.2 mg/dL  Leukocyte Esterase Concentration: Small  Nitrite: Negative  White Blood Cell - Urine: 5 /HPF  Red Blood Cell - Urine: None Seen /HPF  Bacteria: Occasional /HPF  Squamous Epithelial Cells: Present: few  Comment - Urine: Occasional renal tubular epithelial cells present

## 2024-03-12 NOTE — PROGRESS NOTE ADULT - PROBLEM SELECTOR PLAN 2
ALT/AST 72/103  No symptoms at this time   CT with 1.2 cm indeterminate right hepatic lobe hyperdense focus. This can be further characterized with nonemergent hepatic protocol MR  Trend CMP Hx/o Stage IIB SCC Cervical Cancer s/p chemo/ radiation 2013/14  Given the patient's prior history of cervical cancer, follow-up imaging   is recommended after resolution of symptoms or within 6-8 weeks  Obgynonc following

## 2024-03-12 NOTE — PROGRESS NOTE ADULT - SUBJECTIVE AND OBJECTIVE BOX
Patient is a 58y old  Female who presents with a chief complaint of R groin abscess (11 Mar 2024 16:02)    pt seen in icu [  ], reg med floor [   ], bed [  ], chair at bedside [   ], a+o x3 [  ], lethargic [  ],  nad [  ]    lew [  ], ngt [  ], peg [  ], et tube [  ], cent line [  ], picc line [  ]        Allergies    No Known Allergies        Vitals    T(F): 98.2 (03-12-24 @ 05:09), Max: 98.6 (03-11-24 @ 13:20)  HR: 81 (03-12-24 @ 05:09) (71 - 84)  BP: 105/67 (03-12-24 @ 05:09) (105/67 - 140/79)  RR: 18 (03-12-24 @ 05:09) (17 - 21)  SpO2: 96% (03-12-24 @ 05:09) (96% - 99%)  Wt(kg): --  CAPILLARY BLOOD GLUCOSE      POCT Blood Glucose.: 108 mg/dL (11 Mar 2024 07:44)      Labs                          9.8    11.33 )-----------( 450      ( 11 Mar 2024 05:00 )             31.0       03-11    137  |  108  |  13  ----------------------------<  282<H>  3.8   |  24  |  0.68    Ca    8.5      11 Mar 2024 05:00  Phos  3.5     03-11  Mg     1.8     03-11    TPro  7.1  /  Alb  2.3<L>  /  TBili  0.3  /  DBili  x   /  AST  42<H>  /  ALT  89<H>  /  AlkPhos  610<H>  03-11            .Abscess right groin  03-11 @ 16:50 --  --    No polymorphonuclear leukocytes seen per low power field  Rare Gram positive cocci in pairs seen per oil power field      Clean Catch Clean Catch (Midstream)  03-10 @ 21:15   No growth  --  --          Radiology Results      Meds    MEDICATIONS  (STANDING):  cefepime   IVPB 1000 milliGRAM(s) IV Intermittent every 8 hours  influenza   Vaccine 0.5 milliLiter(s) IntraMuscular once  vancomycin  IVPB 1000 milliGRAM(s) IV Intermittent every 12 hours      MEDICATIONS  (PRN):  acetaminophen     Tablet .. 650 milliGRAM(s) Oral every 6 hours PRN Temp greater or equal to 38C (100.4F), Mild Pain (1 - 3)  aluminum hydroxide/magnesium hydroxide/simethicone Suspension 30 milliLiter(s) Oral every 4 hours PRN Dyspepsia  ketorolac   Injectable 15 milliGRAM(s) IV Push every 8 hours PRN Moderate Pain (4 - 6)  melatonin 3 milliGRAM(s) Oral at bedtime PRN Insomnia  morphine  - Injectable 2 milliGRAM(s) IV Push every 6 hours PRN Severe Pain (7 - 10)  ondansetron Injectable 4 milliGRAM(s) IV Push every 8 hours PRN Nausea and/or Vomiting      Physical Exam    Neuro :  no focal deficits  Respiratory: CTA B/L  CV: RRR, S1S2, no murmurs,   Abdominal: Soft, NT, ND +BS,  Extremities: No edema, + peripheral pulses    ASSESSMENT    Abdominal pain    Cervical cancer        PLAN     Patient is a 58y old  Female who presents with a chief complaint of R groin abscess (11 Mar 2024 16:02)    pt seen in icu [  ], reg med floor [ x  ], bed [ x ], chair at bedside [   ], a+o x3 [ x ], lethargic [  ],  nad [x  ]    Allergies    No Known Allergies        Vitals    T(F): 98.2 (03-12-24 @ 05:09), Max: 98.6 (03-11-24 @ 13:20)  HR: 81 (03-12-24 @ 05:09) (71 - 84)  BP: 105/67 (03-12-24 @ 05:09) (105/67 - 140/79)  RR: 18 (03-12-24 @ 05:09) (17 - 21)  SpO2: 96% (03-12-24 @ 05:09) (96% - 99%)  Wt(kg): --  CAPILLARY BLOOD GLUCOSE      POCT Blood Glucose.: 108 mg/dL (11 Mar 2024 07:44)      Labs                          9.8    11.33 )-----------( 450      ( 11 Mar 2024 05:00 )             31.0       03-11    137  |  108  |  13  ----------------------------<  282<H>  3.8   |  24  |  0.68    Ca    8.5      11 Mar 2024 05:00  Phos  3.5     03-11  Mg     1.8     03-11    TPro  7.1  /  Alb  2.3<L>  /  TBili  0.3  /  DBili  x   /  AST  42<H>  /  ALT  89<H>  /  AlkPhos  610<H>  03-11            .Abscess right groin  03-11 @ 16:50 --  --    No polymorphonuclear leukocytes seen per low power field  Rare Gram positive cocci in pairs seen per oil power field      Clean Catch Clean Catch (Midstream)  03-10 @ 21:15   No growth  --  --          Radiology Results      Meds    MEDICATIONS  (STANDING):  cefepime   IVPB 1000 milliGRAM(s) IV Intermittent every 8 hours  influenza   Vaccine 0.5 milliLiter(s) IntraMuscular once  vancomycin  IVPB 1000 milliGRAM(s) IV Intermittent every 12 hours      MEDICATIONS  (PRN):  acetaminophen     Tablet .. 650 milliGRAM(s) Oral every 6 hours PRN Temp greater or equal to 38C (100.4F), Mild Pain (1 - 3)  aluminum hydroxide/magnesium hydroxide/simethicone Suspension 30 milliLiter(s) Oral every 4 hours PRN Dyspepsia  ketorolac   Injectable 15 milliGRAM(s) IV Push every 8 hours PRN Moderate Pain (4 - 6)  melatonin 3 milliGRAM(s) Oral at bedtime PRN Insomnia  morphine  - Injectable 2 milliGRAM(s) IV Push every 6 hours PRN Severe Pain (7 - 10)  ondansetron Injectable 4 milliGRAM(s) IV Push every 8 hours PRN Nausea and/or Vomiting      Physical Exam    Neuro :  no focal deficits  Respiratory: CTA B/L  CV: RRR, S1S2, no murmurs,   Abdominal: Soft, NT, ND +BS,  Extremities: No edema, + peripheral pulses, Right groin abscess  with puncture site w/o drainage        ASSESSMENT    Right obturator externus muscle Abscess   cystitis  right hepatic density  Hx/o Stage IIB SCC Cervical Cancer s/p chemo/ radiation  2013/14.         PLAN    s/p  IR guided drainage of the Abscess with culture   1 cc of purulent fluid aspirated - sample sent for culture and cytology,   abscess cx with Rare Gram positive cocci in pairs seen per oil power field noted above  ucx neg noted above   id f/u   Changed Clindamycin to IV Vancomycin and Cefepime until work up is done  surg f/u   Recommend consult GYN/Onc due to pt's Ca hx and new finding in pelvic area  gyn f/u   s/p chemo and radiation 2013/2014, s/p TANIA/BSO in 2015  new finding consistent with an infection  low suspicion for recurrent disease at this time   cont pain control   nonemergent hepatic protocol MRI to further right hepatic lobe density see on ct abd pelv.  cont current meds

## 2024-03-12 NOTE — PROGRESS NOTE ADULT - SUBJECTIVE AND OBJECTIVE BOX
INTERVAL HPI/OVERNIGHT EVENTS:  seen and examined   family at bedside   Pt resting comfortably. continued right groin pain    afebrile    MEDICATIONS  (STANDING):  cefepime   IVPB 1000 milliGRAM(s) IV Intermittent every 8 hours  influenza   Vaccine 0.5 milliLiter(s) IntraMuscular once  vancomycin  IVPB 1000 milliGRAM(s) IV Intermittent every 12 hours    MEDICATIONS  (PRN):  acetaminophen     Tablet .. 650 milliGRAM(s) Oral every 6 hours PRN Temp greater or equal to 38C (100.4F), Mild Pain (1 - 3)  aluminum hydroxide/magnesium hydroxide/simethicone Suspension 30 milliLiter(s) Oral every 4 hours PRN Dyspepsia  ketorolac   Injectable 15 milliGRAM(s) IV Push every 8 hours PRN Moderate Pain (4 - 6)  melatonin 3 milliGRAM(s) Oral at bedtime PRN Insomnia  morphine  - Injectable 2 milliGRAM(s) IV Push every 6 hours PRN Severe Pain (7 - 10)  ondansetron Injectable 4 milliGRAM(s) IV Push every 8 hours PRN Nausea and/or Vomiting      Vital Signs Last 24 Hrs  T(C): 36.8 (12 Mar 2024 05:09), Max: 37 (11 Mar 2024 13:20)  T(F): 98.2 (12 Mar 2024 05:09), Max: 98.6 (11 Mar 2024 13:20)  HR: 81 (12 Mar 2024 05:09) (71 - 84)  BP: 105/67 (12 Mar 2024 05:09) (105/67 - 140/79)  BP(mean): 93 (11 Mar 2024 18:07) (90 - 103)  RR: 18 (12 Mar 2024 05:09) (17 - 21)  SpO2: 96% (12 Mar 2024 05:09) (96% - 99%)    Parameters below as of 12 Mar 2024 05:09  Patient On (Oxygen Delivery Method): room air        Physical:  General: NAD.  Respirations: Unlabored  Right inner thigh without erythema noted on my exam, previously marked area of erythema with marker, bandaid cdi s/p ir aspiration, no fluctuance no crepitus, no palpable hernia  abd: soft, nontender, nondistended, no rebound, no guarding, no peritonitis     I&O's Detail    11 Mar 2024 07:01  -  12 Mar 2024 07:00  --------------------------------------------------------  IN:  Total IN: 0 mL    OUT:    Voided (mL): 250 mL  Total OUT: 250 mL    Total NET: -250 mL          LABS:                        9.8    11.33 )-----------( 450      ( 11 Mar 2024 05:00 )             31.0             03-11    137  |  108  |  13  ----------------------------<  282<H>  3.8   |  24  |  0.68    Ca    8.5      11 Mar 2024 05:00  Phos  3.5     03-11  Mg     1.8     03-11    TPro  7.1  /  Alb  2.3<L>  /  TBili  0.3  /  DBili  x   /  AST  42<H>  /  ALT  89<H>  /  AlkPhos  610<H>  03-11      < from: CT Abdomen and Pelvis w/ IV Cont (03.10.24 @ 22:44) >  IMPRESSION:  New 5.3 cm complex collection in the region of the right obturator   externus muscle medially, most consistent with abscess.    There is asymmetric mural thickening of the urinary bladder with   perivesical fat stranding, likely reactively inflamed.    Given the patient's prior history of cervical cancer, follow-up imaging   is recommended after resolution of symptoms or within 6-8 weeks.    1.2 cm indeterminate right hepatic lobe hyperdense focus. This can be   further characterized with nonemergent hepatic protocol MRI.      --- End of Report ---            FANNIE SALOMON MD; Attending Radiologist  This document has been electronically signed. Mar 10 2024 11:54PM    < end of copied text >  < from: CT Abdomen and Pelvis w/ IV Cont (03.10.24 @ 22:44) >  IMPRESSION:  New 5.3 cm complex collection in the region of the right obturator   externus muscle medially, most consistent with abscess.    There is asymmetric mural thickening of the urinary bladder with   perivesical fat stranding, likely reactively inflamed.    Given the patient's prior history of cervical cancer, follow-up imaging   is recommended after resolution of symptoms or within 6-8 weeks.    1.2 cm indeterminate right hepatic lobe hyperdense focus. This can be   further characterized with nonemergent hepatic protocol MRI.      --- End of Report ---            FANNIE SALOMON MD; Attending Radiologist  This document has been electronically signed. Mar 10 2024 11:54PM    < from: CT Aspiration Abscess Hematoma, Cyst (03.11.24 @ 16:59) >    ACC: [id number] EXAM:  CT ASP ABSC HEMATOMA CYST#   ORDERED BY: DECLAN JULIO     PROCEDURE DATE:  03/11/2024          INTERPRETATION:  PROCEDURE: CT-guided aspiration of right groin   collection.    : FRANCISCO Malagon MD    CLINICAL INDICATION: 58-year-old female with history of cervical cancer   s/p TANIA/BSO and chemotherapy/radiation therapy, who presents with   collection in the obturator externus muscle concerning for abscess versus   malignancy. Aspiration of the collection was requested.    ANESTHESIA: Intravenous sedation was provided by the attending   anesthesiologist. A total of 7 mL of 1% lidocaine was used for local   anesthesia.    ANTIBIOTICS: No additional antibiotics to patient's existing regimen.    TECHNIQUE: After discussing the risks, benefits and alternatives to this   procedure, informed consent was obtained. A timeout was performed.    The patient was placed supine on the CT examination table and connected   to physiologic monitoring. Noncontrast transaxialimages of the pelvis   were obtained. The ill-defined collection in the right obturator externus   muscle which had been seen on a recent CT scan was again identified,   demonstrating interval decrease in size. The right mons pubis region was   prepped and draped in the usual sterile fashion.    Using CT guidance, a 22-gauge Iesha needle was advanced into the   ill-defined collection using a right anterior approach. Access into the   collection was confirmed with CT images and aspiration of 1 mL of   purulent fluid. The obtained specimen was sent for microbiological and   cytological evaluation, as requested. The needle was removed and   hemostasis was achieved with manual compression. A Band-Aid was then   applied.    The patient toleratedthe procedure well and was transferred to the   recovery unit in stable condition. There were no immediate complications.    IMPRESSION: SUCCESSFUL CT-GUIDED ASPIRATION OF RIGHT GROIN COLLECTION, AS   DESCRIBED ABOVE.    --- End of Report ---            URBAN MALAGON MD; Attending Interventional Radiologist  This document has been electronically signed. Mar 11 2024  5:29PM    < end of copied text >      < end of copied text >

## 2024-03-12 NOTE — PROGRESS NOTE ADULT - ASSESSMENT
Assessment/Plan: 58y HD#2 Right groin abscess with hx/o pain x 2 months.   Hx/o Stage IIB SCC Cervical Cancer s/p chemo/ radiation  2013/14.   Chronic anemia      Neuro: ******  CV: Hemodynamically stable  Pulm: Saturating well on RA.    GI: DIET regular  :  Voiding Spontaneously  Heme: chronic anemia - start iron   FEN: iv lock      , replete electrolytes PRN  ID: Afebrile  Continue Cefepime and Vanco IV  F/u micro- organism and Sensitivity  Dispo: continue inpatient care   Assessment/Plan: 58y HD#2 Right groin abscess with hx/o pain x 2 months.   Hx/o Stage IIB SCC Cervical Cancer s/p chemo/ radiation  2013/14.   Chronic anemia      Neuro: Pain management - IV toradol/morphine as per medicine.   CV: Hemodynamically stable  Pulm: Saturating well on RA.    GI: DIET regular  :  Voiding Spontaneously  Heme: chronic anemia - start iron   FEN: iv lock        EXT: Right groin abscess  -     erythema/warmth improved  daily ambulation with pain management   Surgery - no surgical intervention at this time pending Attending evaluation.   ID: Afebrile  Continue Cefepime and Vanco IV  F/u micro- organism and Sensitivity  Reviewed above with Dr. Esposito.   Dispo: continue inpatient care

## 2024-03-12 NOTE — PHYSICAL THERAPY INITIAL EVALUATION ADULT - SKIN COLOR/CHARACTERISTICS
at exposed areas/without discoloration at exposed areas, except Rt groin bandage C/D/I/without discoloration

## 2024-03-12 NOTE — PHYSICAL THERAPY INITIAL EVALUATION ADULT - ADDITIONAL COMMENTS
-- Prior to recent exacerbation, pt had no need for AD use  -- Pt denies owning RW, SAC, shower chair, or commode  -- Pt does not currently receive services from [facility] or [facility].

## 2024-03-12 NOTE — PROGRESS NOTE ADULT - PROBLEM SELECTOR PLAN 1
CT shows a 5.3 cm complex collection in the right obturator externus medially indicative of abscess  s/p aspiration by IR, 1cc purulent fluid 3/11  Continue Cefepime and Vanco IV  f/u culture and sensitivities   c/w with IV fluids, pain control CT shows a 5.3 cm complex collection in the right obturator externus medially indicative of abscess  s/p aspiration by IR, 1cc purulent fluid 3/11  Changed Clindamycin to IV Vancomycin and Cefepime  abscess cx with Rare Gram positive cocci  pending sensitivities  c/w with pain control  ID Dr. Cline following   Surgery following

## 2024-03-12 NOTE — PROGRESS NOTE ADULT - ASSESSMENT
59 y/o female with ct showing 5.3 cm complex collection of right obturator externus muscle medially, s/p aspiation by IR, 1cc purulent fluid 3/11  hx GYN ca in past    -gyn recs appreciated  -abx  -f/u culture and sensitivities   -signed out to [phone]    This note and all of its recommendations herein are preliminary until co-signed by an attending physician

## 2024-03-12 NOTE — PHYSICAL THERAPY INITIAL EVALUATION ADULT - GAIT DISTANCE, PT EVAL
Pt ambulated w/o c/o dizziness, lightheadedness, SOB, or CP. Ambulation limited secondary to pain in Rt groin./50 feet

## 2024-03-12 NOTE — PROGRESS NOTE ADULT - ASSESSMENT
Patient is a 58y old  Female with history of cervical cancer s/p chemo in 2013, s/p hysterectomy in 2015, with a recent negative PET scan 3 weeks ago, now coming in with 2 months h/o worsening right groin pain. One month ago she saw her Gynecologist and Urologist for the pain. Pap smear and biopsies were negative.  The UA was negative at that time. She was told to continue to take Motrin for symptomatic relief, which she has been taking every 8 hours without any relief.  Last week the pain got worse so she went to urgent care where she was given Keflex 500 mg.  She has been taking the Keflex since March 6. The day before admission the pain became worse and so she came to the emergency room for further evaluation. On admission, he has no fever but leukocytosis and the CT abd/pelvis shows a 5.3 cm complex collection in the right obturator externus muscle medially, consistent with an abscess.  The Surgery has seen her and deferred to IR for drainage.  She was started on IV clindamycin and the ID consult requested to assist with further evaluation and antibiotic management.    # Right obturator externus muscle Abscess  - s/p IR guided Aspiration on 3/12/24  # Cervical cancer -  s/p chemo in 2013, s/p hysterectomy in 2015,    would recommend:    1. Follow up final Abscess culture, is in process  2. Pain management as needed  3. Change cefepime to Zosyn and Discontinue IV Vancomycin   4. Monitor kidney function     d/w Patient and Family at the bed side    Attending Attestation:    Spent more than 45 minutes on total encounter, more than 50 % of the visit was spent counseling and/or coordinating care by the Attending physician.

## 2024-03-13 LAB
ALBUMIN SERPL ELPH-MCNC: 2.4 G/DL — LOW (ref 3.5–5)
ALP SERPL-CCNC: 511 U/L — HIGH (ref 40–120)
ALT FLD-CCNC: 54 U/L DA — SIGNIFICANT CHANGE UP (ref 10–60)
ANION GAP SERPL CALC-SCNC: 7 MMOL/L — SIGNIFICANT CHANGE UP (ref 5–17)
AST SERPL-CCNC: 20 U/L — SIGNIFICANT CHANGE UP (ref 10–40)
BILIRUB SERPL-MCNC: 0.4 MG/DL — SIGNIFICANT CHANGE UP (ref 0.2–1.2)
BUN SERPL-MCNC: 16 MG/DL — SIGNIFICANT CHANGE UP (ref 7–18)
CALCIUM SERPL-MCNC: 9.2 MG/DL — SIGNIFICANT CHANGE UP (ref 8.4–10.5)
CHLORIDE SERPL-SCNC: 105 MMOL/L — SIGNIFICANT CHANGE UP (ref 96–108)
CO2 SERPL-SCNC: 25 MMOL/L — SIGNIFICANT CHANGE UP (ref 22–31)
CREAT SERPL-MCNC: 0.72 MG/DL — SIGNIFICANT CHANGE UP (ref 0.5–1.3)
EGFR: 96 ML/MIN/1.73M2 — SIGNIFICANT CHANGE UP
GLUCOSE SERPL-MCNC: 147 MG/DL — HIGH (ref 70–99)
HCT VFR BLD CALC: 31.6 % — LOW (ref 34.5–45)
HGB BLD-MCNC: 10.2 G/DL — LOW (ref 11.5–15.5)
INR BLD: 1.21 RATIO — HIGH (ref 0.85–1.18)
MCHC RBC-ENTMCNC: 28.7 PG — SIGNIFICANT CHANGE UP (ref 27–34)
MCHC RBC-ENTMCNC: 32.3 GM/DL — SIGNIFICANT CHANGE UP (ref 32–36)
MCV RBC AUTO: 88.8 FL — SIGNIFICANT CHANGE UP (ref 80–100)
MELD SCORE WITH DIALYSIS: 22 POINTS — SIGNIFICANT CHANGE UP
MELD SCORE WITHOUT DIALYSIS: 9 POINTS — SIGNIFICANT CHANGE UP
NRBC # BLD: 0 /100 WBCS — SIGNIFICANT CHANGE UP (ref 0–0)
PLATELET # BLD AUTO: 527 K/UL — HIGH (ref 150–400)
POTASSIUM SERPL-MCNC: 4.1 MMOL/L — SIGNIFICANT CHANGE UP (ref 3.5–5.3)
POTASSIUM SERPL-SCNC: 4.1 MMOL/L — SIGNIFICANT CHANGE UP (ref 3.5–5.3)
PROT SERPL-MCNC: 7.6 G/DL — SIGNIFICANT CHANGE UP (ref 6–8.3)
PROTHROM AB SERPL-ACNC: 13.7 SEC — HIGH (ref 9.5–13)
RBC # BLD: 3.56 M/UL — LOW (ref 3.8–5.2)
RBC # FLD: 13.1 % — SIGNIFICANT CHANGE UP (ref 10.3–14.5)
SODIUM SERPL-SCNC: 137 MMOL/L — SIGNIFICANT CHANGE UP (ref 135–145)
VANCOMYCIN TROUGH SERPL-MCNC: 10.2 UG/ML — SIGNIFICANT CHANGE UP (ref 10–20)
WBC # BLD: 11.1 K/UL — HIGH (ref 3.8–10.5)
WBC # FLD AUTO: 11.1 K/UL — HIGH (ref 3.8–10.5)

## 2024-03-13 RX ORDER — PIPERACILLIN AND TAZOBACTAM 4; .5 G/20ML; G/20ML
3.38 INJECTION, POWDER, LYOPHILIZED, FOR SOLUTION INTRAVENOUS EVERY 8 HOURS
Refills: 0 | Status: COMPLETED | OUTPATIENT
Start: 2024-03-13 | End: 2024-03-19

## 2024-03-13 RX ADMIN — PIPERACILLIN AND TAZOBACTAM 25 GRAM(S): 4; .5 INJECTION, POWDER, LYOPHILIZED, FOR SOLUTION INTRAVENOUS at 05:35

## 2024-03-13 RX ADMIN — Medication 15 MILLIGRAM(S): at 09:10

## 2024-03-13 RX ADMIN — PIPERACILLIN AND TAZOBACTAM 25 GRAM(S): 4; .5 INJECTION, POWDER, LYOPHILIZED, FOR SOLUTION INTRAVENOUS at 21:14

## 2024-03-13 RX ADMIN — Medication 15 MILLIGRAM(S): at 19:10

## 2024-03-13 RX ADMIN — PIPERACILLIN AND TAZOBACTAM 25 GRAM(S): 4; .5 INJECTION, POWDER, LYOPHILIZED, FOR SOLUTION INTRAVENOUS at 14:27

## 2024-03-13 RX ADMIN — Medication 15 MILLIGRAM(S): at 18:40

## 2024-03-13 RX ADMIN — PANTOPRAZOLE SODIUM 40 MILLIGRAM(S): 20 TABLET, DELAYED RELEASE ORAL at 05:09

## 2024-03-13 RX ADMIN — Medication 15 MILLIGRAM(S): at 08:40

## 2024-03-13 NOTE — PROGRESS NOTE ADULT - SUBJECTIVE AND OBJECTIVE BOX
Patient is a 58y old  Female who presents with a chief complaint of R groin abscess (12 Mar 2024 15:58)    pt seen in icu [  ], reg med floor [ x  ], bed [ x ], chair at bedside [   ], a+o x3 [ x ], lethargic [  ],    nad [x  ]        Allergies    No Known Allergies        Vitals    T(F): 98.5 (03-13-24 @ 05:04), Max: 98.8 (03-12-24 @ 13:45)  HR: 77 (03-13-24 @ 05:04) (77 - 99)  BP: 107/70 (03-13-24 @ 05:04) (107/70 - 163/93)  RR: 18 (03-13-24 @ 05:04) (17 - 18)  SpO2: 95% (03-13-24 @ 05:04) (95% - 99%)  Wt(kg): --  CAPILLARY BLOOD GLUCOSE      POCT Blood Glucose.: 108 mg/dL (11 Mar 2024 07:44)      Labs                          10.5   11.00 )-----------( 524      ( 12 Mar 2024 15:41 )             32.0       03-12    140  |  110<H>  |  20<H>  ----------------------------<  149<H>  3.9   |  23  |  1.14    Ca    9.0      12 Mar 2024 15:41              .Abscess right groin  03-11 @ 16:50   Moderate Streptococcus agalactiae (Group B) isolated  Group B streptococci are susceptible to ampicillin,  penicillin and cefazolin, but may be resistant to  erythromycin and clindamycin.  --    No polymorphonuclear leukocytes seen per low power field  Rare Gram positive cocci in pairs seen per oil power field      Clean Catch Clean Catch (Midstream)  03-10 @ 21:15   No growth  --  --          Radiology Results      Meds    MEDICATIONS  (STANDING):  influenza   Vaccine 0.5 milliLiter(s) IntraMuscular once  pantoprazole    Tablet 40 milliGRAM(s) Oral before breakfast  piperacillin/tazobactam IVPB.. 3.375 Gram(s) IV Intermittent every 8 hours      MEDICATIONS  (PRN):  acetaminophen     Tablet .. 650 milliGRAM(s) Oral every 6 hours PRN Temp greater or equal to 38C (100.4F), Mild Pain (1 - 3)  aluminum hydroxide/magnesium hydroxide/simethicone Suspension 30 milliLiter(s) Oral every 4 hours PRN Dyspepsia  ketorolac   Injectable 15 milliGRAM(s) IV Push every 8 hours PRN Moderate Pain (4 - 6)  melatonin 3 milliGRAM(s) Oral at bedtime PRN Insomnia  morphine  - Injectable 2 milliGRAM(s) IV Push every 6 hours PRN Severe Pain (7 - 10)  ondansetron Injectable 4 milliGRAM(s) IV Push every 8 hours PRN Nausea and/or Vomiting      Physical Exam    Neuro :  no focal deficits  Respiratory: CTA B/L  CV: RRR, S1S2, no murmurs,   Abdominal: Soft, NT, ND +BS,  Extremities: No edema, + peripheral pulses, Right groin abscess  with puncture site w/o drainage        ASSESSMENT    Right obturator externus muscle Abscess   cystitis  right hepatic density  Hx/o Stage IIB SCC Cervical Cancer s/p chemo/ radiation  2013/14.         PLAN    s/p  IR guided drainage of the Abscess with culture   1 cc of purulent fluid aspirated - sample sent for culture and cytology,   abscess cx with Rare Gram positive cocci in pairs seen per oil power field noted above  ucx neg noted above   id f/u   Changed Clindamycin to IV Vancomycin and Cefepime until work up is done  surg f/u   Recommend consult GYN/Onc due to pt's Ca hx and new finding in pelvic area  gyn f/u   s/p chemo and radiation 2013/2014, s/p TANIA/BSO in 2015  new finding consistent with an infection  low suspicion for recurrent disease at this time   cont pain control   nonemergent hepatic protocol MRI to further right hepatic lobe density see on ct abd pelv.  cont current meds        Patient is a 58y old  Female who presents with a chief complaint of R groin abscess (12 Mar 2024 15:58)    pt seen in icu [  ], reg med floor [ x  ], bed [ x ], chair at bedside [   ], a+o x3 [ x ], lethargic [  ],    nad [x  ]    pt still with pain on ambulation    Allergies    No Known Allergies        Vitals    T(F): 98.5 (03-13-24 @ 05:04), Max: 98.8 (03-12-24 @ 13:45)  HR: 77 (03-13-24 @ 05:04) (77 - 99)  BP: 107/70 (03-13-24 @ 05:04) (107/70 - 163/93)  RR: 18 (03-13-24 @ 05:04) (17 - 18)  SpO2: 95% (03-13-24 @ 05:04) (95% - 99%)  Wt(kg): --  CAPILLARY BLOOD GLUCOSE      POCT Blood Glucose.: 108 mg/dL (11 Mar 2024 07:44)      Labs                          10.5   11.00 )-----------( 524      ( 12 Mar 2024 15:41 )             32.0       03-12    140  |  110<H>  |  20<H>  ----------------------------<  149<H>  3.9   |  23  |  1.14    Ca    9.0      12 Mar 2024 15:41              .Abscess right groin  03-11 @ 16:50   Moderate Streptococcus agalactiae (Group B) isolated  Group B streptococci are susceptible to ampicillin,  penicillin and cefazolin, but may be resistant to  erythromycin and clindamycin.  --    No polymorphonuclear leukocytes seen per low power field  Rare Gram positive cocci in pairs seen per oil power field      Clean Catch Clean Catch (Midstream)  03-10 @ 21:15   No growth  --  --          Radiology Results      Meds    MEDICATIONS  (STANDING):  influenza   Vaccine 0.5 milliLiter(s) IntraMuscular once  pantoprazole    Tablet 40 milliGRAM(s) Oral before breakfast  piperacillin/tazobactam IVPB.. 3.375 Gram(s) IV Intermittent every 8 hours      MEDICATIONS  (PRN):  acetaminophen     Tablet .. 650 milliGRAM(s) Oral every 6 hours PRN Temp greater or equal to 38C (100.4F), Mild Pain (1 - 3)  aluminum hydroxide/magnesium hydroxide/simethicone Suspension 30 milliLiter(s) Oral every 4 hours PRN Dyspepsia  ketorolac   Injectable 15 milliGRAM(s) IV Push every 8 hours PRN Moderate Pain (4 - 6)  melatonin 3 milliGRAM(s) Oral at bedtime PRN Insomnia  morphine  - Injectable 2 milliGRAM(s) IV Push every 6 hours PRN Severe Pain (7 - 10)  ondansetron Injectable 4 milliGRAM(s) IV Push every 8 hours PRN Nausea and/or Vomiting      Physical Exam    Neuro :  no focal deficits  Respiratory: CTA B/L  CV: RRR, S1S2, no murmurs,   Abdominal: Soft, NT, ND +BS,  Extremities: No edema, + peripheral pulses, Right groin abscess  with puncture site w/o drainage        ASSESSMENT    Right obturator externus muscle Abscess   cystitis  right hepatic density  Hx/o Stage IIB SCC Cervical Cancer s/p chemo/ radiation  2013/14.         PLAN    s/p  IR guided drainage of the Abscess with culture   1 cc of purulent fluid aspirated - sample sent for culture and cytology,   abscess cx withModerate Streptococcus agalactiae (Group B) isolated Group B   streptococci are susceptible to ampicillin, penicillin and cefazolin, noted above  ucx neg noted above   id f/u   abx changed to zosyn   will poss rept ct on friday for f/u   surg f/u   - no surgical intervention at this time pending Attending evaluation   gyn f/u   s/p chemo and radiation 2013/2014, s/p TANIA/BSO in 2015  new finding consistent with an infection  low suspicion for recurrent disease at this time   cont pain control   nonemergent hepatic protocol MRI to further right hepatic lobe density see on ct abd pelv.  phys tx eval noted and rec phys 2-3x/week; in hospital ~ 2 weeks  rolling walker; toileting 3:1 commode  Home PT;   Pain is current limiting factor in pt participation.   After anticipated decrease in pain during hospital course,   pt will see benefit from the skilled management of a HPT.  cont current meds

## 2024-03-13 NOTE — PROGRESS NOTE ADULT - PROBLEM SELECTOR PLAN 1
CT shows a 5.3 cm complex collection in the right obturator externus medially indicative of abscess  s/p aspiration by IR on 3/11  wound Cx + for Moderate Streptococcus  cont zosyn  pending sensitivities  ID Dr. Cline   Surgery

## 2024-03-13 NOTE — PROGRESS NOTE ADULT - ASSESSMENT
58y.o. Female with RLE cellulitis s/p IR drainage    -con't abx per ID rec  -Pain control prn  -No acute surgical intervention indicated at present time  -Contents of this note signed out to jaret GARRETT [phone]     This note and its recommendations herein are preliminary until such time as cosigned by an attending.

## 2024-03-13 NOTE — PROGRESS NOTE ADULT - SUBJECTIVE AND OBJECTIVE BOX
Patient is a 58y old  Female who presents with a chief complaint of R groin abscess (13 Mar 2024 06:36)      INTERVAL HPI/OVERNIGHT EVENTS: No acute events overnight      REVIEW OF SYSTEMS:  CONSTITUTIONAL: No fever, chills  ENMT:  No difficulty hearing, no change in vision  NECK: No pain or stiffness  RESPIRATORY: No cough, SOB  CARDIOVASCULAR: No chest pain, palpitations  GASTROINTESTINAL: No abdominal pain. No nausea, vomiting, or diarrhea  GENITOURINARY: No dysuria  NEUROLOGICAL: No HA  SKIN: No itching, burning, rashes, or lesions   LYMPH NODES: No enlarged glands  ENDOCRINE: No heat or cold intolerance; No hair loss  MUSCULOSKELETAL: No joint pain or swelling; No muscle, back, or extremity pain  PSYCHIATRIC: No depression, anxiety  HEME/LYMPH: No easy bruising, or bleeding gums    T(C): 36.9 (03-13-24 @ 05:04), Max: 37.1 (03-12-24 @ 13:45)  HR: 77 (03-13-24 @ 05:04) (77 - 99)  BP: 107/70 (03-13-24 @ 05:04) (107/70 - 163/93)  RR: 18 (03-13-24 @ 05:04) (17 - 18)  SpO2: 95% (03-13-24 @ 05:04) (95% - 99%)  Wt(kg): --Vital Signs Last 24 Hrs  T(C): 36.9 (13 Mar 2024 05:04), Max: 37.1 (12 Mar 2024 13:45)  T(F): 98.5 (13 Mar 2024 05:04), Max: 98.8 (12 Mar 2024 13:45)  HR: 77 (13 Mar 2024 05:04) (77 - 99)  BP: 107/70 (13 Mar 2024 05:04) (107/70 - 163/93)  BP(mean): --  RR: 18 (13 Mar 2024 05:04) (17 - 18)  SpO2: 95% (13 Mar 2024 05:04) (95% - 99%)    Parameters below as of 13 Mar 2024 05:04  Patient On (Oxygen Delivery Method): room air    MEDICATIONS  (STANDING):  influenza   Vaccine 0.5 milliLiter(s) IntraMuscular once  pantoprazole    Tablet 40 milliGRAM(s) Oral before breakfast  piperacillin/tazobactam IVPB.. 3.375 Gram(s) IV Intermittent every 8 hours    MEDICATIONS  (PRN):  acetaminophen     Tablet .. 650 milliGRAM(s) Oral every 6 hours PRN Temp greater or equal to 38C (100.4F), Mild Pain (1 - 3)  aluminum hydroxide/magnesium hydroxide/simethicone Suspension 30 milliLiter(s) Oral every 4 hours PRN Dyspepsia  ketorolac   Injectable 15 milliGRAM(s) IV Push every 8 hours PRN Moderate Pain (4 - 6)  melatonin 3 milliGRAM(s) Oral at bedtime PRN Insomnia  morphine  - Injectable 2 milliGRAM(s) IV Push every 6 hours PRN Severe Pain (7 - 10)  ondansetron Injectable 4 milliGRAM(s) IV Push every 8 hours PRN Nausea and/or Vomiting      PHYSICAL EXAM:  GENERAL: NAD  EYES: clear conjunctiva; EOMI  ENMT: Moist mucous membranes  NECK: Supple, No JVD, Normal thyroid  CHEST/LUNG: Clear to auscultation bilaterally; No rales, rhonchi, wheezing, or rubs  HEART: S1, S2, Regular rate and rhythm  ABDOMEN: Soft, Nontender, Nondistended; Bowel sounds present  NEURO: Alert & Orientedx3  EXTREMITIES: No LE edema, no calf tenderness, R groins with dressing CDI  LYMPH: No lymphadenopathy noted  SKIN: No rashes or lesions    Consultant(s) Notes Reviewed:  [x ] YES  [ ] NO  Care Discussed with Consultants/Other Providers [ x] YES  [ ] NO    LABS:                        10.2   11.10 )-----------( 527      ( 13 Mar 2024 06:28 )             31.6     03-13    137  |  105  |  16  ----------------------------<  147<H>  4.1   |  25  |  0.72    Ca    9.2      13 Mar 2024 06:28    TPro  7.6  /  Alb  2.4<L>  /  TBili  0.4  /  DBili  x   /  AST  20  /  ALT  54  /  AlkPhos  511<H>  03-13    PT/INR - ( 13 Mar 2024 06:28 )   PT: 13.7 sec;   INR: 1.21 ratio           CAPILLARY BLOOD GLUCOSE      Urinalysis Basic - ( 13 Mar 2024 06:28 )    Color: x / Appearance: x / SG: x / pH: x  Gluc: 147 mg/dL / Ketone: x  / Bili: x / Urobili: x   Blood: x / Protein: x / Nitrite: x   Leuk Esterase: x / RBC: x / WBC x   Sq Epi: x / Non Sq Epi: x / Bacteria: x        RADIOLOGY & ADDITIONAL TESTS:    Imaging Personally Reviewed:  [ x] YES  [ ] NO  < from: CT Aspiration Abscess Hematoma, Cyst (03.11.24 @ 16:59) >    ACC: [id number] EXAM:  CT ASP ABSC HEMATOMA CYST#   ORDERED BY: DECLAN JULIO     PROCEDURE DATE:  03/11/2024          INTERPRETATION:  PROCEDURE: CT-guided aspiration of right groin   collection.    : FRANCISCO Malagon MD    CLINICAL INDICATION: 58-year-old female with history of cervical cancer   s/p TANIA/BSO and chemotherapy/radiation therapy, who presents with   collection in the obturator externus muscle concerning for abscess versus   malignancy. Aspiration of the collection was requested.    ANESTHESIA: Intravenous sedation was provided by the attending   anesthesiologist. A total of 7 mL of 1% lidocaine was used for local   anesthesia.    ANTIBIOTICS: No additional antibiotics to patient's existing regimen.    TECHNIQUE: After discussing the risks, benefits and alternatives to this   procedure, informed consent was obtained. A timeout was performed.    The patient was placed supine on the CT examination table and connected   to physiologic monitoring. Noncontrast transaxialimages of the pelvis   were obtained. The ill-defined collection in the right obturator externus   muscle which had been seen on a recent CT scan was again identified,   demonstrating interval decrease in size. The right mons pubis region was   prepped and draped in the usual sterile fashion.    Using CT guidance, a 22-gauge Iesha needle was advanced into the   ill-defined collection using a right anterior approach. Access into the   collection was confirmed with CT images and aspiration of 1 mL of   purulent fluid. The obtained specimen was sent for microbiological and   cytological evaluation, as requested. The needle was removed and   hemostasis was achieved with manual compression. A Band-Aid was then   applied.    The patient toleratedthe procedure well and was transferred to the   recovery unit in stable condition. There were no immediate complications.    IMPRESSION: SUCCESSFUL CT-GUIDED ASPIRATION OF RIGHT GROIN COLLECTION, AS   DESCRIBED ABOVE.    --- End of Report ---            URBAN MALAGON MD; Attending Interventional Radiologist  This document has been electronically signed. Mar 11 2024  5:29PM    < end of copied text >    Culture - Abscess with Gram Stain (03.11.24 @ 16:50)   Gram Stain:   No polymorphonuclear leukocytes seen per low power field   Rare Gram positive cocci in pairs seen per oil power field  Specimen Source: .Abscess right groin  Culture Results:   Moderate Streptococcus agalactiae (Group B) isolated   Group B streptococci are susceptible to ampicillin,   penicillin and cefazolin, but may be resistant to   erythromycin and clindamycin.

## 2024-03-13 NOTE — PROGRESS NOTE ADULT - SUBJECTIVE AND OBJECTIVE BOX
Patient is seen and examined at the bed side, is afebrile. She is still c.o moderate Right groin pain, The abscess culture grew Streptococcus.      REVIEW OF SYSTEMS: All other review systems are negative      ALLERGIES: No Known Allergies      Vital Signs Last 24 Hrs  T(C): 36.9 (13 Mar 2024 13:25), Max: 36.9 (13 Mar 2024 05:04)  T(F): 98.4 (13 Mar 2024 13:25), Max: 98.5 (13 Mar 2024 05:04)  HR: 80 (13 Mar 2024 13:25) (77 - 80)  BP: 123/77 (13 Mar 2024 13:25) (107/70 - 158/82)  BP(mean): --  RR: 17 (13 Mar 2024 13:25) (17 - 18)  SpO2: 94% (13 Mar 2024 13:25) (94% - 99%)    Parameters below as of 13 Mar 2024 13:25  Patient On (Oxygen Delivery Method): room air        PHYSICAL EXAM:  GENERAL: Not in acute distress  CVS: s1 and s2 present  RESP: Not using accessory muscles  GI: Right Groin redness resolving but very tender to touch  EXT: No pedal edema  CNS: Awake and Alert      LABS:                        10.2   11.10 )-----------( 527      ( 13 Mar 2024 06:28 )             31.6                           10.5   11.00 )-----------( 524      ( 12 Mar 2024 15:41 )             32.0         03-13    137  |  105  |  16  ----------------------------<  147<H>  4.1   |  25  |  0.72    Ca    9.2      13 Mar 2024 06:28    TPro  7.6  /  Alb  2.4<L>  /  TBili  0.4  /  DBili  x   /  AST  20  /  ALT  54  /  AlkPhos  511<H>  03-13    03-12    140  |  110<H>  |  20<H>  ----------------------------<  149<H>  3.9   |  23  |  1.14    Ca    9.0      12 Mar 2024 15:41  Phos  3.5     03-11  Mg     1.8     03-11    TPro  7.1  /  Alb  2.3<L>  /  TBili  0.3  /  DBili  x   /  AST  42<H>  /  ALT  89<H>  /  AlkPhos  610<H>  03-11    PT/INR - ( 11 Mar 2024 11:15 )   PT: 12.7 sec;   INR: 1.12 ratio      PTT - ( 11 Mar 2024 11:15 )  PTT:41.8 sec        MEDICATIONS  (STANDING):    influenza   Vaccine 0.5 milliLiter(s) IntraMuscular once  pantoprazole    Tablet 40 milliGRAM(s) Oral before breakfast  piperacillin/tazobactam IVPB.. 3.375 Gram(s) IV Intermittent every 8 hours        RADIOLOGY & ADDITIONAL TESTS:    3/10/24: CT Abdomen and Pelvis w/ IV Cont (03.10.24 @ 22:44) New 5.3 cm complex collection in the region of the right obturator   externus muscle medially, most consistent with abscess.    There is asymmetric mural thickening of the urinary bladder with perivesical fat stranding, likely reactively inflamed.    Given the patient's prior history of cervical cancer, follow-up imaging is recommended after resolution of symptoms or within 6-8 weeks.    1.2 cm indeterminate right hepatic lobe hyperdense focus. This can be further characterized with nonemergent hepatic protocol MRI.        MICROBIOLOGY DATA:    Culture - Abscess with Gram Stain (03.11.24 @ 16:50)   Gram Stain:   No polymorphonuclear leukocytes seen per low power field   Rare Gram positive cocci in pairs seen per oil power field  Specimen Source: .Abscess right groin  Culture Results:   Moderate Streptococcus agalactiae (Group B) isolated   Group B streptococci are susceptible to ampicillin,   penicillin and cefazolin, but may be resistant to   erythromycin and clindamycin.      MRSA/MSSA PCR (03.11.24 @ 09:07)   MRSA PCR Result.: NotDetec:     Culture - Urine (03.10.24 @ 21:15)   Specimen Source: Clean Catch Clean Catch (Midstream)  Culture Results:  No growth    Urinalysis + Microscopic Examination (03.10.24 @ 21:15)   pH Urine: 6.0  Urine Appearance: Clear  Color: Yellow  Specific Gravity: 1.010  Protein, Urine: Negative mg/dL  Glucose Qualitative, Urine: Negative mg/dL  Ketone - Urine: Negative mg/dL  Blood, Urine: Negative  Bilirubin: Negative  Urobilinogen: 0.2 mg/dL  Leukocyte Esterase Concentration: Small  Nitrite: Negative  White Blood Cell - Urine: 5 /HPF  Red Blood Cell - Urine: None Seen /HPF  Bacteria: Occasional /HPF  Squamous Epithelial Cells: Present: few  Comment - Urine: Occasional renal tubular epithelial cells present

## 2024-03-13 NOTE — PROGRESS NOTE ADULT - SUBJECTIVE AND OBJECTIVE BOX
INTERVAL HPI/OVERNIGHT EVENTS:  Pt resting comfortably. c/o R groin pain when ambulating.  Otherwise feeling well.  +flatus/BM. Voiding well.    MEDICATIONS  (STANDING):  influenza   Vaccine 0.5 milliLiter(s) IntraMuscular once  pantoprazole    Tablet 40 milliGRAM(s) Oral before breakfast  piperacillin/tazobactam IVPB.. 3.375 Gram(s) IV Intermittent every 8 hours    MEDICATIONS  (PRN):  acetaminophen     Tablet .. 650 milliGRAM(s) Oral every 6 hours PRN Temp greater or equal to 38C (100.4F), Mild Pain (1 - 3)  aluminum hydroxide/magnesium hydroxide/simethicone Suspension 30 milliLiter(s) Oral every 4 hours PRN Dyspepsia  ketorolac   Injectable 15 milliGRAM(s) IV Push every 8 hours PRN Moderate Pain (4 - 6)  melatonin 3 milliGRAM(s) Oral at bedtime PRN Insomnia  morphine  - Injectable 2 milliGRAM(s) IV Push every 6 hours PRN Severe Pain (7 - 10)  ondansetron Injectable 4 milliGRAM(s) IV Push every 8 hours PRN Nausea and/or Vomiting    Vital Signs Last 24 Hrs  T(C): 36.9 (13 Mar 2024 05:04), Max: 37.1 (12 Mar 2024 13:45)  T(F): 98.5 (13 Mar 2024 05:04), Max: 98.8 (12 Mar 2024 13:45)  HR: 77 (13 Mar 2024 05:04) (77 - 99)  BP: 107/70 (13 Mar 2024 05:04) (107/70 - 163/93)  BP(mean): --  RR: 18 (13 Mar 2024 05:04) (17 - 18)  SpO2: 95% (13 Mar 2024 05:04) (95% - 99%)    Parameters below as of 13 Mar 2024 05:04  Patient On (Oxygen Delivery Method): room air    Physical:  General: A&Ox3. NAD.  RLE: Warm, NVI. No erythema, induration or fluctuation noted of medial thigh. R labia majora soft without erythema.    LABS:                        10.2   11.10 )-----------( 527      ( 13 Mar 2024 06:28 )             31.6             03-13    137  |  105  |  16  ----------------------------<  147<H>  4.1   |  25  |  0.72    Ca    9.2      13 Mar 2024 06:28    TPro  7.6  /  Alb  2.4<L>  /  TBili  0.4  /  DBili  x   /  AST  20  /  ALT  54  /  AlkPhos  511<H>  03-13    Culture - Abscess with Gram Stain (03.11.24 @ 16:50)    Gram Stain:   No polymorphonuclear leukocytes seen per low power field  Rare Gram positive cocci in pairs seen per oil power field   Specimen Source: .Abscess right groin   Culture Results:   Moderate Streptococcus agalactiae (Group B) isolated  Group B streptococci are susceptible to ampicillin,  penicillin and cefazolin, but may be resistant to  erythromycin and clindamycin.

## 2024-03-13 NOTE — PHARMACOTHERAPY INTERVENTION NOTE - COMMENTS
Recommended to change cefepime to piperacillin-tazobactam for broader coverage for abscess as per ID recommendations. Abscess culture: Streptococcus agalactiae. eGFR 55 (monitor kidney function).       Felisa RamosD   Clinical Pharmacy Specialist, Infectious Diseases  Tele-Antimicrobial Stewardship Program (Tele-ASP)  Tele-ASP Phone: [phone]

## 2024-03-13 NOTE — PHARMACOTHERAPY INTERVENTION NOTE - COMMENTS
Recommended to discontinue vancomycin IVPB since abscess culture came back with Streptococcus agalactiae with is covered with piperacillin-tazobactam as per ID recommendations.       Kira Townsend, PharmD   Clinical Pharmacy Specialist, Infectious Diseases  Tele-Antimicrobial Stewardship Program (Tele-[facility])  Tele-[facility] Phone: [phone]

## 2024-03-13 NOTE — PROGRESS NOTE ADULT - PROBLEM SELECTOR PLAN 2
Price (Use Numbers Only, No Special Characters Or $): 0 Hx/o Stage IIB SCC Cervical Cancer s/p chemo/ radiation 2013/14  Given the patient's prior history of cervical cancer, follow-up imaging   is recommended after resolution of symptoms or within 6-8 weeks  Obgynonc following Reconstitution Date (Optional): 4/10/19 Lot #: [id number] Periorbital Skin Units: 2 Detail Level: Detailed Consent: Written consent obtained. Risks include but not limited to lid/brow ptosis, bruising, swelling, diplopia, temporary effect, incomplete chemical denervation. Expiration Date (Month Year): 08/2021 Dilution (U/0.1 Cc): 5 Post-Care Instructions: Patient instructed to not lie down for 4 hours and limit physical activity for 24 hours. Patient instructed not to travel by airplane for 48 hours.

## 2024-03-13 NOTE — PROGRESS NOTE ADULT - ASSESSMENT
She is a 58-year-old woman with a remote history of uterine cancer who presents with right-sided groin abscess. s/p aspiration by IR, 1cc purulent fluid 3/11. On IV abx. Cx + for Moderate Streptococcus. on IV zosyn. ID followed.

## 2024-03-13 NOTE — PROGRESS NOTE ADULT - ASSESSMENT
Patient is a 58y old  Female with history of cervical cancer s/p chemo in 2013, s/p hysterectomy in 2015, with a recent negative PET scan 3 weeks ago, now coming in with 2 months h/o worsening right groin pain. One month ago she saw her Gynecologist and Urologist for the pain. Pap smear and biopsies were negative.  The UA was negative at that time. She was told to continue to take Motrin for symptomatic relief, which she has been taking every 8 hours without any relief.  Last week the pain got worse so she went to urgent care where she was given Keflex 500 mg.  She has been taking the Keflex since March 6. The day before admission the pain became worse and so she came to the emergency room for further evaluation. On admission, he has no fever but leukocytosis and the CT abd/pelvis shows a 5.3 cm complex collection in the right obturator externus muscle medially, consistent with an abscess.  The Surgery has seen her and deferred to IR for drainage.  She was started on IV clindamycin and the ID consult requested to assist with further evaluation and antibiotic management.    # Right obturator externus muscle Abscess  - s/p IR guided Aspiration on 3/12/24  # Cervical cancer -  s/p chemo in 2013, s/p hysterectomy in 2015,    would recommend:    1. Pain management as needed  2. Continue Zosyn for now  3. Monitor kidney function   4. OOB to chair     d/w covering Yanci PITTS,  Patient and Family at the bed side    Attending Attestation:    Spent more than 35 minutes on total encounter, more than 50 % of the visit was spent counseling and/or coordinating care by the Attending physician.

## 2024-03-14 ENCOUNTER — TRANSCRIPTION ENCOUNTER (OUTPATIENT)
Age: 59
End: 2024-03-14

## 2024-03-14 LAB
ANION GAP SERPL CALC-SCNC: 7 MMOL/L — SIGNIFICANT CHANGE UP (ref 5–17)
BUN SERPL-MCNC: 15 MG/DL — SIGNIFICANT CHANGE UP (ref 7–18)
CALCIUM SERPL-MCNC: 9.2 MG/DL — SIGNIFICANT CHANGE UP (ref 8.4–10.5)
CHLORIDE SERPL-SCNC: 106 MMOL/L — SIGNIFICANT CHANGE UP (ref 96–108)
CO2 SERPL-SCNC: 23 MMOL/L — SIGNIFICANT CHANGE UP (ref 22–31)
CREAT SERPL-MCNC: 0.74 MG/DL — SIGNIFICANT CHANGE UP (ref 0.5–1.3)
EGFR: 93 ML/MIN/1.73M2 — SIGNIFICANT CHANGE UP
GLUCOSE SERPL-MCNC: 150 MG/DL — HIGH (ref 70–99)
HCT VFR BLD CALC: 32.5 % — LOW (ref 34.5–45)
HGB BLD-MCNC: 10.4 G/DL — LOW (ref 11.5–15.5)
MCHC RBC-ENTMCNC: 29.1 PG — SIGNIFICANT CHANGE UP (ref 27–34)
MCHC RBC-ENTMCNC: 32 GM/DL — SIGNIFICANT CHANGE UP (ref 32–36)
MCV RBC AUTO: 91 FL — SIGNIFICANT CHANGE UP (ref 80–100)
NRBC # BLD: 0 /100 WBCS — SIGNIFICANT CHANGE UP (ref 0–0)
PLATELET # BLD AUTO: 579 K/UL — HIGH (ref 150–400)
POTASSIUM SERPL-MCNC: 4 MMOL/L — SIGNIFICANT CHANGE UP (ref 3.5–5.3)
POTASSIUM SERPL-SCNC: 4 MMOL/L — SIGNIFICANT CHANGE UP (ref 3.5–5.3)
RBC # BLD: 3.57 M/UL — LOW (ref 3.8–5.2)
RBC # FLD: 12.9 % — SIGNIFICANT CHANGE UP (ref 10.3–14.5)
SODIUM SERPL-SCNC: 136 MMOL/L — SIGNIFICANT CHANGE UP (ref 135–145)
WBC # BLD: 9.26 K/UL — SIGNIFICANT CHANGE UP (ref 3.8–10.5)
WBC # FLD AUTO: 9.26 K/UL — SIGNIFICANT CHANGE UP (ref 3.8–10.5)

## 2024-03-14 RX ORDER — HYDROMORPHONE HYDROCHLORIDE 2 MG/ML
0.5 INJECTION INTRAMUSCULAR; INTRAVENOUS; SUBCUTANEOUS ONCE
Refills: 0 | Status: DISCONTINUED | OUTPATIENT
Start: 2024-03-14 | End: 2024-03-14

## 2024-03-14 RX ADMIN — MORPHINE SULFATE 2 MILLIGRAM(S): 50 CAPSULE, EXTENDED RELEASE ORAL at 05:09

## 2024-03-14 RX ADMIN — Medication 15 MILLIGRAM(S): at 07:57

## 2024-03-14 RX ADMIN — PANTOPRAZOLE SODIUM 40 MILLIGRAM(S): 20 TABLET, DELAYED RELEASE ORAL at 05:06

## 2024-03-14 RX ADMIN — Medication 15 MILLIGRAM(S): at 08:15

## 2024-03-14 RX ADMIN — PIPERACILLIN AND TAZOBACTAM 25 GRAM(S): 4; .5 INJECTION, POWDER, LYOPHILIZED, FOR SOLUTION INTRAVENOUS at 05:07

## 2024-03-14 RX ADMIN — HYDROMORPHONE HYDROCHLORIDE 0.5 MILLIGRAM(S): 2 INJECTION INTRAMUSCULAR; INTRAVENOUS; SUBCUTANEOUS at 10:55

## 2024-03-14 RX ADMIN — HYDROMORPHONE HYDROCHLORIDE 0.5 MILLIGRAM(S): 2 INJECTION INTRAMUSCULAR; INTRAVENOUS; SUBCUTANEOUS at 10:38

## 2024-03-14 RX ADMIN — PIPERACILLIN AND TAZOBACTAM 25 GRAM(S): 4; .5 INJECTION, POWDER, LYOPHILIZED, FOR SOLUTION INTRAVENOUS at 14:24

## 2024-03-14 RX ADMIN — PIPERACILLIN AND TAZOBACTAM 25 GRAM(S): 4; .5 INJECTION, POWDER, LYOPHILIZED, FOR SOLUTION INTRAVENOUS at 21:57

## 2024-03-14 RX ADMIN — MORPHINE SULFATE 2 MILLIGRAM(S): 50 CAPSULE, EXTENDED RELEASE ORAL at 05:39

## 2024-03-14 NOTE — DISCHARGE NOTE PROVIDER - NSDCMRMEDTOKEN_GEN_ALL_CORE_FT
acetaminophen 500 mg oral tablet: 1  orally every 8 hours   ascorbic acid 1000 mg oral tablet: 1  orally once a day   Feosol 325 mg (65 mg elemental iron) oral tablet: 1  orally once a day   potassium: 20 mEq  once a day    acetaminophen 500 mg oral tablet: 1  orally every 8 hours   ascorbic acid 1000 mg oral tablet: 1  orally once a day   cefTRIAXone 2 g injection: 2 gram(s) intravenously once a day once daily until 4/9  Feosol 325 mg (65 mg elemental iron) oral tablet: 1  orally once a day   flush line with 10cc normal saline before and after iv infusion: until 4/9 MDD: 0  metroNIDAZOLE 500 mg oral tablet: 1 tab(s) orally 3 times a day  potassium: 20 mEq  once a day   Remove line after infusion is completed: after 4/9  weekly labs while on abx cbc w/ diff, cmp, esr, and crp: until 4/9

## 2024-03-14 NOTE — DISCHARGE NOTE PROVIDER - CARE PROVIDER_API CALL
Porter Camcaho  Internal Medicine  [facility] [place] [id number]  Phone: [phone]  Fax: [phone]  Follow Up Time: 2 weeks   Porter Camacho  Internal Medicine  [address] [place] [id number]  Phone: [phone]  Fax: [phone]  Follow Up Time: 2 weeks    ID,   Upon discharge, pt to follow up with ID Dr. Abhishek Gonzalez via Telehealth   Telephone#: [phone]  Fax #: [phone].  Phone: (   )    -  Fax: (   )    -  Follow Up Time:

## 2024-03-14 NOTE — PROGRESS NOTE ADULT - ASSESSMENT
Patient is a 58y old  Female with history of cervical cancer s/p chemo in 2013, s/p hysterectomy in 2015, with a recent negative PET scan 3 weeks ago, now coming in with 2 months h/o worsening right groin pain. One month ago she saw her Gynecologist and Urologist for the pain. Pap smear and biopsies were negative.  The UA was negative at that time. She was told to continue to take Motrin for symptomatic relief, which she has been taking every 8 hours without any relief.  Last week the pain got worse so she went to urgent care where she was given Keflex 500 mg.  She has been taking the Keflex since March 6. The day before admission the pain became worse and so she came to the emergency room for further evaluation. On admission, he has no fever but leukocytosis and the CT abd/pelvis shows a 5.3 cm complex collection in the right obturator externus muscle medially, consistent with an abscess.  The Surgery has seen her and deferred to IR for drainage.  She was started on IV clindamycin and the ID consult requested to assist with further evaluation and antibiotic management.    # Right obturator externus muscle Abscess  - s/p IR guided Aspiration of the abscess  on 3/12/24 - The abscess culture from 3/11 grew Streptococcus agalactiae.   # Cervical cancer -  s/p chemo in 2013, s/p hysterectomy in 2015,    would recommend:    1. Follow up final  Abscess culture grew Streptococcus agalactiae to date  2. Continue Zosyn until Radiological resolution of Right obturator externus muscle Abscess  3. Monitor kidney function   4. OOb to chair     d/w covering NPYanci, and Patient     Attending Attestation:    Spent more than 35 minutes on total encounter, more than 50 % of the visit was spent counseling and/or coordinating care by the Attending physician.

## 2024-03-14 NOTE — PROGRESS NOTE ADULT - SUBJECTIVE AND OBJECTIVE BOX
Patient is a 58y old  Female who presents with a chief complaint of R groin abscess (14 Mar 2024 11:55)      INTERVAL HPI/OVERNIGHT EVENTS: No acute events overnight       REVIEW OF SYSTEMS:  CONSTITUTIONAL: No fever, chills  ENMT:  No difficulty hearing, no change in vision  NECK: No pain or stiffness  RESPIRATORY: No cough, SOB  CARDIOVASCULAR: No chest pain, palpitations  GASTROINTESTINAL: No abdominal pain. No nausea, vomiting, or diarrhea  GENITOURINARY: No dysuria  NEUROLOGICAL: No HA  SKIN: No itching, burning, rashes, or lesions   LYMPH NODES: No enlarged glands  ENDOCRINE: No heat or cold intolerance; No hair loss  MUSCULOSKELETAL: No joint pain or swelling; No muscle, back, or extremity pain  PSYCHIATRIC: No depression, anxiety  HEME/LYMPH: No easy bruising, or bleeding gums    T(C): 36.4 (03-14-24 @ 14:14), Max: 36.9 (03-13-24 @ 20:18)  HR: 67 (03-14-24 @ 14:14) (67 - 97)  BP: 95/54 (03-14-24 @ 14:14) (95/54 - 120/79)  RR: 18 (03-14-24 @ 14:14) (18 - 18)  SpO2: 97% (03-14-24 @ 14:14) (95% - 97%)  Wt(kg): --Vital Signs Last 24 Hrs  T(C): 36.4 (14 Mar 2024 14:14), Max: 36.9 (13 Mar 2024 20:18)  T(F): 97.5 (14 Mar 2024 14:14), Max: 98.5 (13 Mar 2024 20:18)  HR: 67 (14 Mar 2024 14:14) (67 - 97)  BP: 95/54 (14 Mar 2024 14:14) (95/54 - 120/79)  BP(mean): 82 (13 Mar 2024 20:18) (82 - 82)  RR: 18 (14 Mar 2024 14:14) (18 - 18)  SpO2: 97% (14 Mar 2024 14:14) (95% - 97%)    Parameters below as of 14 Mar 2024 14:14  Patient On (Oxygen Delivery Method): room air    MEDICATIONS  (STANDING):  influenza   Vaccine 0.5 milliLiter(s) IntraMuscular once  pantoprazole    Tablet 40 milliGRAM(s) Oral before breakfast  piperacillin/tazobactam IVPB.. 3.375 Gram(s) IV Intermittent every 8 hours    MEDICATIONS  (PRN):  acetaminophen     Tablet .. 650 milliGRAM(s) Oral every 6 hours PRN Temp greater or equal to 38C (100.4F), Mild Pain (1 - 3)  aluminum hydroxide/magnesium hydroxide/simethicone Suspension 30 milliLiter(s) Oral every 4 hours PRN Dyspepsia  ketorolac   Injectable 15 milliGRAM(s) IV Push every 8 hours PRN Moderate Pain (4 - 6)  melatonin 3 milliGRAM(s) Oral at bedtime PRN Insomnia  morphine  - Injectable 2 milliGRAM(s) IV Push every 6 hours PRN Severe Pain (7 - 10)  ondansetron Injectable 4 milliGRAM(s) IV Push every 8 hours PRN Nausea and/or Vomiting      PHYSICAL EXAM:  GENERAL: NAD  EYES: clear conjunctiva; EOMI  ENMT: Moist mucous membranes  NECK: Supple, No JVD, Normal thyroid  CHEST/LUNG: Clear to auscultation bilaterally; No rales, rhonchi, wheezing, or rubs  HEART: S1, S2, Regular rate and rhythm  ABDOMEN: Soft, Nontender, Nondistended; Bowel sounds present, R grion abscess drainage site CDI  NEURO: Alert & Oriented X3  EXTREMITIES: No LE edema, no calf tenderness  LYMPH: No lymphadenopathy noted  SKIN: No rashes or lesions    Consultant(s) Notes Reviewed:  [x ] YES  [ ] NO  Care Discussed with Consultants/Other Providers [ x] YES  [ ] NO    LABS:                        10.4   9.26  )-----------( 579      ( 14 Mar 2024 06:48 )             32.5     03-14    136  |  106  |  15  ----------------------------<  150<H>  4.0   |  23  |  0.74    Ca    9.2      14 Mar 2024 06:48    TPro  7.6  /  Alb  2.4<L>  /  TBili  0.4  /  DBili  x   /  AST  20  /  ALT  54  /  AlkPhos  511<H>  03-13    PT/INR - ( 13 Mar 2024 06:28 )   PT: 13.7 sec;   INR: 1.21 ratio           CAPILLARY BLOOD GLUCOSE          Urinalysis Basic - ( 14 Mar 2024 06:48 )    Color: x / Appearance: x / SG: x / pH: x  Gluc: 150 mg/dL / Ketone: x  / Bili: x / Urobili: x   Blood: x / Protein: x / Nitrite: x   Leuk Esterase: x / RBC: x / WBC x   Sq Epi: x / Non Sq Epi: x / Bacteria: x        RADIOLOGY & ADDITIONAL TESTS:    Imaging Personally Reviewed:  [x ] YES  [ ] NO  < from: CT Abdomen and Pelvis w/ IV Cont (03.10.24 @ 22:44) >    ACC: [id number] EXAM:  CT ABDOMEN AND PELVIS IC   ORDERED BY: GURPREET REED     PROCEDURE DATE:  03/10/2024          INTERPRETATION:  CLINICAL INFORMATION: Right lower quadrant pain    COMPARISON: 2/24/2024    CONTRAST/COMPLICATIONS:  IV Contrast:Omnipaque 350  90 cc administered   10 cc discarded  Oral Contrast: NONE  Complications: None reported at time of study completion    PROCEDURE:  CT of the Abdomen and Pelvis was performed.  Sagittal and coronal reformats were performed.    FINDINGS:  LOWER CHEST: Within normal limits.    LIVER: 1.2 cm indeterminate hyperdense focus in the right hepatic lobe   (2/40)  BILE DUCTS: Normal caliber.  GALLBLADDER: Cholelithiasis.  SPLEEN: Within normal limits.  PANCREAS: Within normal limits.  ADRENALS: 1.7 cm indeterminate left adrenal nodule, not substantially   changed  KIDNEYS/URETERS: Within normal limits.    BLADDER: Asymmetric mural thickening of the urinary bladder with   perivesical fat stranding  REPRODUCTIVE ORGANS: Hysterectomy. History of cervical cancer, now   definite nodular mass in the vaginal cuff.    BOWEL: No bowel obstruction. Appendix is normal.  PERITONEUM: No ascites.  VESSELS: Atherosclerotic changes.  RETROPERITONEUM/LYMPH NODES: No lymphadenopathy.  ABDOMINAL WALL: In the region of the right obturator externus muscle   medially there is a 5.3 x 3.8 x 3.5 cm complex peripherally enhancing   collection with surrounding fat stranding, new compared to prior  BONES: No acute osseous abnormality.    IMPRESSION:  New 5.3 cm complex collection in the region of the right obturator   externus muscle medially, most consistent with abscess.    There is asymmetric mural thickening of the urinary bladder with   perivesical fat stranding, likely reactively inflamed.    Given the patient's prior history of cervical cancer, follow-up imaging   is recommended after resolution of symptoms or within 6-8 weeks.    1.2 cm indeterminate right hepatic lobe hyperdense focus. This can be   further characterized with nonemergent hepatic protocol MRI.      --- End of Report ---            FANNIE SALOMON MD; Attending Radiologist  This document has been electronically signed. Mar 10 2024 11:54PM    < end of copied text >

## 2024-03-14 NOTE — DISCHARGE NOTE PROVIDER - CARE PROVIDERS DIRECT ADDRESSES
[email or web address]

## 2024-03-14 NOTE — PROGRESS NOTE ADULT - SUBJECTIVE AND OBJECTIVE BOX
OBGYN PA Note    Patient seen at bedside resting comfortably offers no new complaints. + Ambulation, voiding without difficulty, + flatus; no bm; tolerating regular diet. Denies CP, SOB, N/V/D, dizziness, palpitations, vaginal bleeding.     Vital Signs Last 24 Hrs  T(C): 36.7 (14 Mar 2024 05:04), Max: 36.9 (13 Mar 2024 13:25)  T(F): 98.1 (14 Mar 2024 05:04), Max: 98.5 (13 Mar 2024 20:18)  HR: 80 (14 Mar 2024 05:04) (80 - 97)  BP: 120/79 (14 Mar 2024 05:04) (120/69 - 123/77)  BP(mean): 82 (13 Mar 2024 20:18) (82 - 82)  RR: 18 (14 Mar 2024 05:04) (17 - 18)  SpO2: 95% (14 Mar 2024 05:04) (94% - 97%)    Parameters below as of 14 Mar 2024 05:04  Patient On (Oxygen Delivery Method): room air      Gen: A&O x 3, NAD  Chest: CTA B/L  Cardiac: S1,S2  RRR  Abdomen: +BS; soft; Nontender, nondistended  Gyn: no vaginal bleeding   Extremities: Nontender, no worsening edema, erythema noted on patient's R. groin, noted to be smaller than previously marked area, tender to touch                          10.4   9.26  )-----------( 579      ( 14 Mar 2024 06:48 )             32.5     03-14    136  |  106  |  15  ----------------------------<  150<H>  4.0   |  23  |  0.74    Ca    9.2      14 Mar 2024 06:48    TPro  7.6  /  Alb  2.4<L>  /  TBili  0.4  /  DBili  x   /  AST  20  /  ALT  54  /  AlkPhos  511<H>  03-13

## 2024-03-14 NOTE — PROGRESS NOTE ADULT - PROBLEM SELECTOR PLAN 1
-management per medicine team  - patient to follow up outpatient with her gyn-onc Dr. Blanco per patient preference  - cont abx treatment

## 2024-03-14 NOTE — PROGRESS NOTE ADULT - PROBLEM SELECTOR PLAN 1
CT shows a 5.3 cm complex collection in the right obturator externus medially indicative of abscess  s/p aspiration by IR on 3/11  wound Cx + for Moderate Streptococcus  cont zosyn  pending sensitivities  - f/u repeat CT in am   ID Dr. Cline   Surgery following

## 2024-03-14 NOTE — PROGRESS NOTE ADULT - SUBJECTIVE AND OBJECTIVE BOX
INTERVAL HPI/OVERNIGHT EVENTS:  Pt resting comfortably. C/o of burning to left posterior leg.    MEDICATIONS  (STANDING):  influenza   Vaccine 0.5 milliLiter(s) IntraMuscular once  pantoprazole    Tablet 40 milliGRAM(s) Oral before breakfast  piperacillin/tazobactam IVPB.. 3.375 Gram(s) IV Intermittent every 8 hours    MEDICATIONS  (PRN):  acetaminophen     Tablet .. 650 milliGRAM(s) Oral every 6 hours PRN Temp greater or equal to 38C (100.4F), Mild Pain (1 - 3)  aluminum hydroxide/magnesium hydroxide/simethicone Suspension 30 milliLiter(s) Oral every 4 hours PRN Dyspepsia  ketorolac   Injectable 15 milliGRAM(s) IV Push every 8 hours PRN Moderate Pain (4 - 6)  melatonin 3 milliGRAM(s) Oral at bedtime PRN Insomnia  morphine  - Injectable 2 milliGRAM(s) IV Push every 6 hours PRN Severe Pain (7 - 10)  ondansetron Injectable 4 milliGRAM(s) IV Push every 8 hours PRN Nausea and/or Vomiting      Vital Signs Last 24 Hrs  T(C): 36.7 (14 Mar 2024 05:04), Max: 36.9 (13 Mar 2024 13:25)  T(F): 98.1 (14 Mar 2024 05:04), Max: 98.5 (13 Mar 2024 20:18)  HR: 80 (14 Mar 2024 05:04) (80 - 97)  BP: 120/79 (14 Mar 2024 05:04) (120/69 - 123/77)  BP(mean): 82 (13 Mar 2024 20:18) (82 - 82)  RR: 18 (14 Mar 2024 05:04) (17 - 18)  SpO2: 95% (14 Mar 2024 05:04) (94% - 97%)    Parameters below as of 14 Mar 2024 05:04  Patient On (Oxygen Delivery Method): room air      Physical:  General: A&Ox3. NAD.  Extremities: left lower extremity posterior thigh with small 0.25cm opening with no active drainage; aquacel placed; surrounding erythema improving    LABS:                        10.4   9.26  )-----------( 579      ( 14 Mar 2024 06:48 )             32.5             03-14    136  |  106  |  15  ----------------------------<  150<H>  4.0   |  23  |  0.74    Ca    9.2      14 Mar 2024 06:48    TPro  7.6  /  Alb  2.4<L>  /  TBili  0.4  /  DBili  x   /  AST  20  /  ALT  54  /  AlkPhos  511<H>  03-13     INTERVAL HPI/OVERNIGHT EVENTS:  Pt resting comfortably. Pt c/o severe pain with external rotation of the right hip, worse when walking.    MEDICATIONS  (STANDING):  influenza   Vaccine 0.5 milliLiter(s) IntraMuscular once  pantoprazole    Tablet 40 milliGRAM(s) Oral before breakfast  piperacillin/tazobactam IVPB.. 3.375 Gram(s) IV Intermittent every 8 hours    MEDICATIONS  (PRN):  acetaminophen     Tablet .. 650 milliGRAM(s) Oral every 6 hours PRN Temp greater or equal to 38C (100.4F), Mild Pain (1 - 3)  aluminum hydroxide/magnesium hydroxide/simethicone Suspension 30 milliLiter(s) Oral every 4 hours PRN Dyspepsia  ketorolac   Injectable 15 milliGRAM(s) IV Push every 8 hours PRN Moderate Pain (4 - 6)  melatonin 3 milliGRAM(s) Oral at bedtime PRN Insomnia  morphine  - Injectable 2 milliGRAM(s) IV Push every 6 hours PRN Severe Pain (7 - 10)  ondansetron Injectable 4 milliGRAM(s) IV Push every 8 hours PRN Nausea and/or Vomiting      Vital Signs Last 24 Hrs  T(C): 36.7 (14 Mar 2024 05:04), Max: 36.9 (13 Mar 2024 13:25)  T(F): 98.1 (14 Mar 2024 05:04), Max: 98.5 (13 Mar 2024 20:18)  HR: 80 (14 Mar 2024 05:04) (80 - 97)  BP: 120/79 (14 Mar 2024 05:04) (120/69 - 123/77)  BP(mean): 82 (13 Mar 2024 20:18) (82 - 82)  RR: 18 (14 Mar 2024 05:04) (17 - 18)  SpO2: 95% (14 Mar 2024 05:04) (94% - 97%)    Parameters below as of 14 Mar 2024 05:04  Patient On (Oxygen Delivery Method): room air      Physical:  General: A&Ox3. NAD.  Groin: right groin NO visible erythema, induration or fluctuance appreciated; tender to palpation to groin crease   pain upon external hip rotation      LABS:                      10.4   9.26  )-----------( 579      ( 14 Mar 2024 06:48 )             32.5             03-14    136  |  106  |  15  ----------------------------<  150<H>  4.0   |  23  |  0.74    Ca    9.2      14 Mar 2024 06:48    TPro  7.6  /  Alb  2.4<L>  /  TBili  0.4  /  DBili  x   /  AST  20  /  ALT  54  /  AlkPhos  511<H>  03-13

## 2024-03-14 NOTE — DISCHARGE NOTE PROVIDER - PROVIDER TOKENS
PROVIDER:[TOKEN:[[id number]],FOLLOWUP:[2 weeks]] PROVIDER:[TOKEN:[[id number]],FOLLOWUP:[2 weeks]],FREE:[LAST:[ID],PHONE:[(   )    -],FAX:[(   )    -],ADDRESS:[Upon discharge, pt to follow up with ID Dr. Abhisehk Gonzalez via Telehealth   Telephone#: [phone]  Fax #: [phone].]]

## 2024-03-14 NOTE — PROGRESS NOTE ADULT - ASSESSMENT
58F with pmhx cervical CA  with right groin ill-defined collection s/p drainage of 1cc by IR 3/11/24  sample sent for culture and cytology  Pt still with severe pain upon external rotation of right hip; no visibile signs of cellulitis    f/u results of culture  consider repeat imaging with possible reconsult IR  discussed with PA covering #[id number]     58F with pmhx cervical CA  with right groin ill-defined collection s/p drainage of 1cc by IR 3/11/24  sample sent for culture and cytology  Pt still with severe pain upon external rotation of right hip; no visibile external signs of soft tissue cellulitis    f/u results of culture  consider repeat imaging with possible reconsult IR  discussed with PA covering #[phone]

## 2024-03-14 NOTE — DISCHARGE NOTE PROVIDER - NSDCCPCAREPLAN_GEN_ALL_CORE_FT
PRINCIPAL DISCHARGE DIAGNOSIS  Diagnosis: Abscess  Assessment and Plan of Treatment:       SECONDARY DISCHARGE DIAGNOSES  Diagnosis: Abscess  Assessment and Plan of Treatment:      PRINCIPAL DISCHARGE DIAGNOSIS  Diagnosis: Abscess  Assessment and Plan of Treatment: YOu were found to have on CT abdomen/pelvis shows a 5.3 cm complex collection in the right obturator externus muscle medially, consistent with an abscess.  You were followed by surgery.  YOu had incision and drainage. YOu were given IV antibiotics per infectious disease recomendation . You will continue to take antibitics   flagyl 500mg Q8hr  and ceftrizone 2mg daily until 4/9/2024.  special IV PICC line was place on 3/.21/2024 for atibioitcs use.  follow up with your PCP in 1 week         SECONDARY DISCHARGE DIAGNOSES  Diagnosis: Hepatic lesion  Assessment and Plan of Treatment: follow up with your oncologist    Diagnosis: Cervical cancer  Assessment and Plan of Treatment: follow up with your oncologist.  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      Diagnosis: S/P PICC central line placement  Assessment and Plan of Treatment: A PICC can stay in place for several weeks or months. You will need to care for the PICC, and for the skin around the catheter site. Proper care is important to prevent damage to the catheter, and to prevent infections. Change the bandage and injection caps every 3 to 7 days or as directed. Change the bandage any time it becomes wet, dirty, or moves out of place.  PICC (Peripherally Inserted Central Catheter)  DISCHARGE INSTRUCTIONS:  Call your local emergency number (911 in the [place]) for any of the following:  You feel lightheaded, short of breath, or have chest pain.  You have trouble breathing.  You cough up blood.  Seek care immediately if:  Blood soaks through your bandage.  Your arm or leg feels warm, tender, and painful. It may look swollen and red.  You have trouble moving your arm.  Your catheter falls out.  Call your doctor if:  You have a fever or swelling, redness, pain, or pus where the catheter was inserted.  You see a tear in the tubing of your catheter.  You see fluid leaking from the insertion site.  You have questions or concerns about your condition or care.  Prevent an infection: The area around your catheter may get infected, or you may get an infection in your bloodstream. A bloodstream infection is called a central line-associated bloodstream infection (CLABSI). A CLABSI is caused by bacteria getting into your bloodstream through your catheter. This can lead to severe illness. The following are ways you can help prevent a CLABSI:  Wash your hands often. Use soap or an alcohol-based hand rub to clean your hands. Clean your hands before and after you touch the catheter or the catheter site. Remind anyone who cares for your catheter to wash their hands.  Handwashing  Limit contact with the catheter. Do not touch or handle your catheter unless you need to care for it. Do not pull, push on, or move the catheter when you clean your skin or change the dressing. Wear clean medical gloves when you touch your catheter or change dressings.       PRINCIPAL DISCHARGE DIAGNOSIS  Diagnosis: Abscess  Assessment and Plan of Treatment: YOu were found to have on CT abdomen/pelvis shows a 5.3 cm complex collection in the right obturator externus muscle medially, consistent with an abscess.  You were followed by surgery.  YOu had incision and drainage. YOu were given IV antibiotics per infectious disease recomendation . You will continue to take antibitics   flagyl 500mg Q8hr  and ceftrizone 2mg daily until 4/9/2024.  special IV PICC line was place on 3/.21/2024 for atibioitcs use.  follow up with your PCP in 1 week.  YOu home health NP should remove your IV after last dose of antibiotics on 4/9/2024        SECONDARY DISCHARGE DIAGNOSES  Diagnosis: Hepatic lesion  Assessment and Plan of Treatment: follow up with your oncologist    Diagnosis: Cervical cancer  Assessment and Plan of Treatment: follow up with your oncologist.  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      Diagnosis: S/P PICC central line placement  Assessment and Plan of Treatment: A PICC can stay in place for several weeks or months. You will need to care for the PICC, and for the skin around the catheter site. Proper care is important to prevent damage to the catheter, and to prevent infections. Change the bandage and injection caps every 3 to 7 days or as directed. Change the bandage any time it becomes wet, dirty, or moves out of place.  PICC (Peripherally Inserted Central Catheter)  DISCHARGE INSTRUCTIONS:  Call your local emergency number (911 in the [place]) for any of the following:  You feel lightheaded, short of breath, or have chest pain.  You have trouble breathing.  You cough up blood.  Seek care immediately if:  Blood soaks through your bandage.  Your arm or leg feels warm, tender, and painful. It may look swollen and red.  You have trouble moving your arm.  Your catheter falls out.  Call your doctor if:  You have a fever or swelling, redness, pain, or pus where the catheter was inserted.  You see a tear in the tubing of your catheter.  You see fluid leaking from the insertion site.  You have questions or concerns about your condition or care.  Prevent an infection: The area around your catheter may get infected, or you may get an infection in your bloodstream. A bloodstream infection is called a central line-associated bloodstream infection (CLABSI). A CLABSI is caused by bacteria getting into your bloodstream through your catheter. This can lead to severe illness. The following are ways you can help prevent a CLABSI:  Wash your hands often. Use soap or an alcohol-based hand rub to clean your hands. Clean your hands before and after you touch the catheter or the catheter site. Remind anyone who cares for your catheter to wash their hands.  Handwashing  Limit contact with the catheter. Do not touch or handle your catheter unless you need to care for it. Do not pull, push on, or move the catheter when you clean your skin or change the dressing. Wear clean medical gloves when you touch your catheter or change dressings.

## 2024-03-14 NOTE — PROGRESS NOTE ADULT - SUBJECTIVE AND OBJECTIVE BOX
Call placed to pharmacy, stated currently working on meds, will walk up to ed when done  Patient is a 58y old  Female who presents with a chief complaint of R groin abscess (13 Mar 2024 15:56)    pt seen in icu [  ], reg med floor [ x  ], bed [ x ], chair at bedside [   ], a+o x3 [ x ], lethargic [  ],    nad [x  ]      Allergies    No Known Allergies        Vitals    T(F): 98.1 (03-14-24 @ 05:04), Max: 98.5 (03-13-24 @ 20:18)  HR: 80 (03-14-24 @ 05:04) (80 - 97)  BP: 120/79 (03-14-24 @ 05:04) (120/69 - 123/77)  RR: 18 (03-14-24 @ 05:04) (17 - 18)  SpO2: 95% (03-14-24 @ 05:04) (94% - 97%)  Wt(kg): --  CAPILLARY BLOOD GLUCOSE          Labs                          10.2   11.10 )-----------( 527      ( 13 Mar 2024 06:28 )             31.6       03-13    137  |  105  |  16  ----------------------------<  147<H>  4.1   |  25  |  0.72    Ca    9.2      13 Mar 2024 06:28    TPro  7.6  /  Alb  2.4<L>  /  TBili  0.4  /  DBili  x   /  AST  20  /  ALT  54  /  AlkPhos  511<H>  03-13            .Abscess right groin  03-11 @ 16:50   Moderate Streptococcus agalactiae (Group B) isolated  Group B streptococci are susceptible to ampicillin,  penicillin and cefazolin, but may be resistant to  erythromycin and clindamycin.  --    No polymorphonuclear leukocytes seen per low power field  Rare Gram positive cocci in pairs seen per oil power field      Clean Catch Clean Catch (Midstream)  03-10 @ 21:15   No growth  --  --          Radiology Results      Meds    MEDICATIONS  (STANDING):  influenza   Vaccine 0.5 milliLiter(s) IntraMuscular once  pantoprazole    Tablet 40 milliGRAM(s) Oral before breakfast  piperacillin/tazobactam IVPB.. 3.375 Gram(s) IV Intermittent every 8 hours      MEDICATIONS  (PRN):  acetaminophen     Tablet .. 650 milliGRAM(s) Oral every 6 hours PRN Temp greater or equal to 38C (100.4F), Mild Pain (1 - 3)  aluminum hydroxide/magnesium hydroxide/simethicone Suspension 30 milliLiter(s) Oral every 4 hours PRN Dyspepsia  ketorolac   Injectable 15 milliGRAM(s) IV Push every 8 hours PRN Moderate Pain (4 - 6)  melatonin 3 milliGRAM(s) Oral at bedtime PRN Insomnia  morphine  - Injectable 2 milliGRAM(s) IV Push every 6 hours PRN Severe Pain (7 - 10)  ondansetron Injectable 4 milliGRAM(s) IV Push every 8 hours PRN Nausea and/or Vomiting      Physical Exam    Neuro :  no focal deficits  Respiratory: CTA B/L  CV: RRR, S1S2, no murmurs,   Abdominal: Soft, NT, ND +BS,  Extremities: No edema, + peripheral pulses, Right groin abscess  with puncture site w/o drainage        ASSESSMENT    Right obturator externus muscle Abscess   cystitis  right hepatic density  Hx/o Stage IIB SCC Cervical Cancer s/p chemo/ radiation  2013/14.         PLAN    s/p  IR guided drainage of the Abscess with culture   1 cc of purulent fluid aspirated - sample sent for culture and cytology,   abscess cx withModerate Streptococcus agalactiae (Group B) isolated Group B   streptococci are susceptible to ampicillin, penicillin and cefazolin, noted above  ucx neg noted above   id f/u   abx changed to zosyn   will poss rept ct on friday for f/u   surg f/u   - no surgical intervention at this time pending Attending evaluation   gyn f/u   s/p chemo and radiation 2013/2014, s/p TANIA/BSO in 2015  new finding consistent with an infection  low suspicion for recurrent disease at this time   cont pain control   nonemergent hepatic protocol MRI to further right hepatic lobe density see on ct abd pelv.  phys tx eval noted and rec phys 2-3x/week; in hospital ~ 2 weeks  rolling walker; toileting 3:1 commode  Home PT;   Pain is current limiting factor in pt participation.   After anticipated decrease in pain during hospital course,   pt will see benefit from the skilled management of a HPT.  cont current meds        Patient is a 58y old  Female who presents with a chief complaint of R groin abscess (13 Mar 2024 15:56)    pt seen in icu [  ], reg med floor [ x  ], bed [ x ], chair at bedside [   ], a+o x3 [ x ], lethargic [  ],    nad [x  ]      Allergies    No Known Allergies        Vitals    T(F): 98.1 (03-14-24 @ 05:04), Max: 98.5 (03-13-24 @ 20:18)  HR: 80 (03-14-24 @ 05:04) (80 - 97)  BP: 120/79 (03-14-24 @ 05:04) (120/69 - 123/77)  RR: 18 (03-14-24 @ 05:04) (17 - 18)  SpO2: 95% (03-14-24 @ 05:04) (94% - 97%)  Wt(kg): --  CAPILLARY BLOOD GLUCOSE          Labs                          10.2   11.10 )-----------( 527      ( 13 Mar 2024 06:28 )             31.6       03-13    137  |  105  |  16  ----------------------------<  147<H>  4.1   |  25  |  0.72    Ca    9.2      13 Mar 2024 06:28    TPro  7.6  /  Alb  2.4<L>  /  TBili  0.4  /  DBili  x   /  AST  20  /  ALT  54  /  AlkPhos  511<H>  03-13            .Abscess right groin  03-11 @ 16:50   Moderate Streptococcus agalactiae (Group B) isolated  Group B streptococci are susceptible to ampicillin,  penicillin and cefazolin, but may be resistant to  erythromycin and clindamycin.  --    No polymorphonuclear leukocytes seen per low power field  Rare Gram positive cocci in pairs seen per oil power field      Clean Catch Clean Catch (Midstream)  03-10 @ 21:15   No growth  --  --          Radiology Results      Meds    MEDICATIONS  (STANDING):  influenza   Vaccine 0.5 milliLiter(s) IntraMuscular once  pantoprazole    Tablet 40 milliGRAM(s) Oral before breakfast  piperacillin/tazobactam IVPB.. 3.375 Gram(s) IV Intermittent every 8 hours      MEDICATIONS  (PRN):  acetaminophen     Tablet .. 650 milliGRAM(s) Oral every 6 hours PRN Temp greater or equal to 38C (100.4F), Mild Pain (1 - 3)  aluminum hydroxide/magnesium hydroxide/simethicone Suspension 30 milliLiter(s) Oral every 4 hours PRN Dyspepsia  ketorolac   Injectable 15 milliGRAM(s) IV Push every 8 hours PRN Moderate Pain (4 - 6)  melatonin 3 milliGRAM(s) Oral at bedtime PRN Insomnia  morphine  - Injectable 2 milliGRAM(s) IV Push every 6 hours PRN Severe Pain (7 - 10)  ondansetron Injectable 4 milliGRAM(s) IV Push every 8 hours PRN Nausea and/or Vomiting      Physical Exam    Neuro :  no focal deficits  Respiratory: CTA B/L  CV: RRR, S1S2, no murmurs,   Abdominal: Soft, NT, ND +BS,  Extremities: No edema, + peripheral pulses, Right groin abscess  with puncture site w/o drainage        ASSESSMENT    Right obturator externus muscle Abscess   cystitis  right hepatic density  Hx/o Stage IIB SCC Cervical Cancer s/p chemo/ radiation  2013/14.         PLAN    s/p  IR guided drainage of the Abscess with culture   1 cc of purulent fluid aspirated - sample sent for culture and cytology,   abscess cx with Moderate Streptococcus agalactiae (Group B) isolated Group B   streptococci are susceptible to ampicillin, penicillin and cefazolin, noted above  ucx neg noted above   id f/u   cont zosyn   will poss rept ct on friday for f/u as pt still with c/o pain  surg f/u   -No acute surgical intervention indicated at present time  gyn f/u   s/p chemo and radiation 2013/2014, s/p TANIA/BSO in 2015  new finding consistent with an infection  low suspicion for recurrent disease at this time   cont pain control   nonemergent hepatic protocol MRI to further right hepatic lobe density see on ct abd pelv.  phys tx eval noted and rec phys 2-3x/week; in hospital ~ 2 weeks  rolling walker; toileting 3:1 commode  Home PT;   Pain is current limiting factor in pt participation.   After anticipated decrease in pain during hospital course,   pt will see benefit from the skilled management of a HPT.  cont current meds

## 2024-03-14 NOTE — DISCHARGE NOTE PROVIDER - NSDCFUADDAPPT_GEN_ALL_CORE_FT
Upon discharge, pt to follow up with ID Dr. Abhishek Gonzalez via Telehealth   Telephone#: [phone]  Fax #: [phone].

## 2024-03-14 NOTE — DISCHARGE NOTE PROVIDER - HOSPITAL COURSE
She is a 58-year-old woman with a remote history of uterine cancer who presents with right-sided groin abscess. s/p aspiration by IR, 1cc purulent fluid 3/11. On IV abx. Cx + for Moderate Streptococcus. on IV zosyn. ID followed.       Problem/Plan - 1:  ·  Problem: Abscess.   ·  Plan: CT shows a 5.3 cm complex collection in the right obturator externus medially indicative of abscess  s/p aspiration by IR on 3/11  wound Cx + for Moderate Streptococcus  cont zosyn  pending sensitivities  - f/u repeat CT in am   ID Dr. Cline   Surgery following.   She is a 58-year-old woman with a remote history of uterine cancer who presents with right-sided groin abscess. s/p aspiration by IR, 1cc purulent fluid 3/11. On IV abx. Cx + for Moderate Streptococcus. on IV zosyn. ID followed. Pt underwent I&D. Repeat Ct showed improvement in abscess. pt will d/c with ceftriaxone and falgyl until 4/9/2024. s/p PICC line placed on 3/21. Pt pain improved. discussed plan with patiet daughter at bedside.  pt is clinically improved and medically optimized for d/c   Please note that this a brief summary of hospital course please refer to daily progress notes and consult notes for full course and events      Upon discharge, pt to follow up with ID Dr. Abhishek Gonzalez via Telehealth   Telephone#: [phone]  Fax #: [phone].

## 2024-03-14 NOTE — PROGRESS NOTE ADULT - PROBLEM SELECTOR PLAN 2
Hx/o Stage IIB SCC Cervical Cancer s/p chemo/ radiation 2013/14  Given the patient's prior history of cervical cancer, follow-up imaging   is recommended after resolution of symptoms or within 6-8 weeks  Obgynonc following

## 2024-03-14 NOTE — PROGRESS NOTE ADULT - ASSESSMENT
A/P: 57 yo HD#4 admitted to medicine for R. groin pain concerning for cellulitis of right thigh patient with pmhx of cervical cancer. Patient is stable  -management per medicine team  - patient to follow up outpatient with her gyn-onc Dr. Blanco per patient preference  -d/w Dr. Somers Gyn-Onc attending

## 2024-03-14 NOTE — PROGRESS NOTE ADULT - SUBJECTIVE AND OBJECTIVE BOX
Patient is seen and examined at the bed side, is afebrile. She is feeling better, the Right groin pain is improving.        REVIEW OF SYSTEMS: All other review systems are negative      ALLERGIES: No Known Allergies      Vital Signs Last 24 Hrs  T(C): 36.4 (14 Mar 2024 14:14), Max: 36.9 (13 Mar 2024 20:18)  T(F): 97.5 (14 Mar 2024 14:14), Max: 98.5 (13 Mar 2024 20:18)  HR: 67 (14 Mar 2024 14:14) (67 - 97)  BP: 95/54 (14 Mar 2024 14:14) (95/54 - 120/79)  BP(mean): 82 (13 Mar 2024 20:18) (82 - 82)  RR: 18 (14 Mar 2024 14:14) (18 - 18)  SpO2: 97% (14 Mar 2024 14:14) (95% - 97%)    Parameters below as of 14 Mar 2024 14:14  Patient On (Oxygen Delivery Method): room air      PHYSICAL EXAM:  GENERAL: Not in acute distress  CVS: s1 and s2 present  RESP: Not using accessory muscles  GI: Right Groin redness resolving but very tender to touch  EXT: No pedal edema  CNS: Awake and Alert      LABS:                        10.4   9.26  )-----------( 579      ( 14 Mar 2024 06:48 )             32.5                           10.2   11.10 )-----------( 527      ( 13 Mar 2024 06:28 )             31.6         03-14    136  |  106  |  15  ----------------------------<  150<H>  4.0   |  23  |  0.74    Ca    9.2      14 Mar 2024 06:48    TPro  7.6  /  Alb  2.4<L>  /  TBili  0.4  /  DBili  x   /  AST  20  /  ALT  54  /  AlkPhos  511<H>  03-13    03-13    137  |  105  |  16  ----------------------------<  147<H>  4.1   |  25  |  0.72    Ca    9.2      13 Mar 2024 06:28    TPro  7.6  /  Alb  2.4<L>  /  TBili  0.4  /  DBili  x   /  AST  20  /  ALT  54  /  AlkPhos  511<H>  03-13    PT/INR - ( 11 Mar 2024 11:15 )   PT: 12.7 sec;   INR: 1.12 ratio      PTT - ( 11 Mar 2024 11:15 )  PTT:41.8 sec        MEDICATIONS  (STANDING):    influenza   Vaccine 0.5 milliLiter(s) IntraMuscular once  pantoprazole    Tablet 40 milliGRAM(s) Oral before breakfast  piperacillin/tazobactam IVPB.. 3.375 Gram(s) IV Intermittent every 8 hours        RADIOLOGY & ADDITIONAL TESTS:    3/10/24: CT Abdomen and Pelvis w/ IV Cont (03.10.24 @ 22:44) New 5.3 cm complex collection in the region of the right obturator   externus muscle medially, most consistent with abscess.    There is asymmetric mural thickening of the urinary bladder with perivesical fat stranding, likely reactively inflamed.    Given the patient's prior history of cervical cancer, follow-up imaging is recommended after resolution of symptoms or within 6-8 weeks.    1.2 cm indeterminate right hepatic lobe hyperdense focus. This can be further characterized with nonemergent hepatic protocol MRI.        MICROBIOLOGY DATA:    Culture - Abscess with Gram Stain (03.11.24 @ 16:50)   Gram Stain:   No polymorphonuclear leukocytes seen per low power field   Rare Gram positive cocci in pairs seen per oil power field  Specimen Source: .Abscess right groin  Culture Results:   Moderate Streptococcus agalactiae (Group B) isolated   Group B streptococci are susceptible to ampicillin,   penicillin and cefazolin, but may be resistant to   erythromycin and clindamycin.      MRSA/MSSA PCR (03.11.24 @ 09:07)   MRSA PCR Result.: NotDetec:     Culture - Urine (03.10.24 @ 21:15)   Specimen Source: Clean Catch Clean Catch (Midstream)  Culture Results:  No growth    Urinalysis + Microscopic Examination (03.10.24 @ 21:15)   pH Urine: 6.0  Urine Appearance: Clear  Color: Yellow  Specific Gravity: 1.010  Protein, Urine: Negative mg/dL  Glucose Qualitative, Urine: Negative mg/dL  Ketone - Urine: Negative mg/dL  Blood, Urine: Negative  Bilirubin: Negative  Urobilinogen: 0.2 mg/dL  Leukocyte Esterase Concentration: Small  Nitrite: Negative  White Blood Cell - Urine: 5 /HPF  Red Blood Cell - Urine: None Seen /HPF  Bacteria: Occasional /HPF  Squamous Epithelial Cells: Present: few  Comment - Urine: Occasional renal tubular epithelial cells present

## 2024-03-14 NOTE — PROGRESS NOTE ADULT - NS ATTEND AMEND GEN_ALL_CORE FT
57 yo with PMHx significant for cervical cancer admitted for right medial thigh abscess s/p aspiration currently on Zosyn with clinical improvement. Patient seen at bedside with her daughter, work up to date discussed in detail. All questions answered. Appreciate excellent care by the medicine team and appreciate ID input.  Please feel free to reach out with any questions.    Irma Somers MD

## 2024-03-15 LAB
ANION GAP SERPL CALC-SCNC: 6 MMOL/L — SIGNIFICANT CHANGE UP (ref 5–17)
BUN SERPL-MCNC: 19 MG/DL — HIGH (ref 7–18)
CALCIUM SERPL-MCNC: 9.3 MG/DL — SIGNIFICANT CHANGE UP (ref 8.4–10.5)
CHLORIDE SERPL-SCNC: 104 MMOL/L — SIGNIFICANT CHANGE UP (ref 96–108)
CO2 SERPL-SCNC: 26 MMOL/L — SIGNIFICANT CHANGE UP (ref 22–31)
CREAT SERPL-MCNC: 0.97 MG/DL — SIGNIFICANT CHANGE UP (ref 0.5–1.3)
EGFR: 67 ML/MIN/1.73M2 — SIGNIFICANT CHANGE UP
GLUCOSE SERPL-MCNC: 137 MG/DL — HIGH (ref 70–99)
HCT VFR BLD CALC: 32 % — LOW (ref 34.5–45)
HGB BLD-MCNC: 10.4 G/DL — LOW (ref 11.5–15.5)
MCHC RBC-ENTMCNC: 29.6 PG — SIGNIFICANT CHANGE UP (ref 27–34)
MCHC RBC-ENTMCNC: 32.5 GM/DL — SIGNIFICANT CHANGE UP (ref 32–36)
MCV RBC AUTO: 91.2 FL — SIGNIFICANT CHANGE UP (ref 80–100)
NRBC # BLD: 0 /100 WBCS — SIGNIFICANT CHANGE UP (ref 0–0)
PLATELET # BLD AUTO: 575 K/UL — HIGH (ref 150–400)
POTASSIUM SERPL-MCNC: 3.7 MMOL/L — SIGNIFICANT CHANGE UP (ref 3.5–5.3)
POTASSIUM SERPL-SCNC: 3.7 MMOL/L — SIGNIFICANT CHANGE UP (ref 3.5–5.3)
RBC # BLD: 3.51 M/UL — LOW (ref 3.8–5.2)
RBC # FLD: 12.8 % — SIGNIFICANT CHANGE UP (ref 10.3–14.5)
SODIUM SERPL-SCNC: 136 MMOL/L — SIGNIFICANT CHANGE UP (ref 135–145)
WBC # BLD: 9.73 K/UL — SIGNIFICANT CHANGE UP (ref 3.8–10.5)
WBC # FLD AUTO: 9.73 K/UL — SIGNIFICANT CHANGE UP (ref 3.8–10.5)

## 2024-03-15 PROCEDURE — 74177 CT ABD & PELVIS W/CONTRAST: CPT | Mod: 26

## 2024-03-15 RX ADMIN — PANTOPRAZOLE SODIUM 40 MILLIGRAM(S): 20 TABLET, DELAYED RELEASE ORAL at 05:26

## 2024-03-15 RX ADMIN — PIPERACILLIN AND TAZOBACTAM 25 GRAM(S): 4; .5 INJECTION, POWDER, LYOPHILIZED, FOR SOLUTION INTRAVENOUS at 13:56

## 2024-03-15 RX ADMIN — PIPERACILLIN AND TAZOBACTAM 25 GRAM(S): 4; .5 INJECTION, POWDER, LYOPHILIZED, FOR SOLUTION INTRAVENOUS at 22:03

## 2024-03-15 RX ADMIN — Medication 15 MILLIGRAM(S): at 09:58

## 2024-03-15 RX ADMIN — PIPERACILLIN AND TAZOBACTAM 25 GRAM(S): 4; .5 INJECTION, POWDER, LYOPHILIZED, FOR SOLUTION INTRAVENOUS at 05:26

## 2024-03-15 RX ADMIN — Medication 15 MILLIGRAM(S): at 10:15

## 2024-03-15 NOTE — PROGRESS NOTE ADULT - ASSESSMENT
Patient is a 58y old  Female with history of cervical cancer s/p chemo in 2013, s/p hysterectomy in 2015, with a recent negative PET scan 3 weeks ago, now coming in with 2 months h/o worsening right groin pain. One month ago she saw her Gynecologist and Urologist for the pain. Pap smear and biopsies were negative.  The UA was negative at that time. She was told to continue to take Motrin for symptomatic relief, which she has been taking every 8 hours without any relief.  Last week the pain got worse so she went to urgent care where she was given Keflex 500 mg.  She has been taking the Keflex since March 6. The day before admission the pain became worse and so she came to the emergency room for further evaluation. On admission, he has no fever but leukocytosis and the CT abd/pelvis shows a 5.3 cm complex collection in the right obturator externus muscle medially, consistent with an abscess.  The Surgery has seen her and deferred to IR for drainage.  She was started on IV clindamycin and the ID consult requested to assist with further evaluation and antibiotic management.    # Right obturator externus muscle Abscess  - s/p IR guided Aspiration of the abscess  on 3/12/24 - The abscess culture from 3/11 grew Streptococcus agalactiae.   # Cervical cancer -  s/p chemo in 2013, s/p hysterectomy in 2015,    would recommend:    1. Follow up repeat CT scan to reassess the resolution of the abscess  2. Continue Zosyn inpatient and may change to oral Augmentin 875mg q 12hours if  repeat CT scan shows decreasing the Right obturator externus muscle Abscess  3. Monitor kidney function   4. OOb to chair     d/w covering Yanci PITTS,  at the bed side and Patient     Attending Attestation:    Spent more than 35 minutes on total encounter, more than 50 % of the visit was spent counseling and/or coordinating care by the Attending physician.

## 2024-03-15 NOTE — PROGRESS NOTE ADULT - SUBJECTIVE AND OBJECTIVE BOX
Patient is a 58y old  Female who presents with a chief complaint of R groin abscess (15 Mar 2024 10:26)      INTERVAL HPI/OVERNIGHT EVENTS: no acute events overnight       REVIEW OF SYSTEMS:  CONSTITUTIONAL: No fever, chills  ENMT:  No difficulty hearing, no change in vision  NECK: No pain or stiffness  RESPIRATORY: No cough, SOB  CARDIOVASCULAR: No chest pain, palpitations  GASTROINTESTINAL: No abdominal pain. No nausea, vomiting, or diarrhea  GENITOURINARY: No dysuria  NEUROLOGICAL: No HA  SKIN: No itching, burning, rashes, or lesions   LYMPH NODES: No enlarged glands  ENDOCRINE: No heat or cold intolerance; No hair loss  MUSCULOSKELETAL: No joint pain or swelling; No muscle, back, or extremity pain  PSYCHIATRIC: No depression, anxiety  HEME/LYMPH: No easy bruising, or bleeding gums    T(C): 36.7 (03-15-24 @ 05:07), Max: 37.8 (03-14-24 @ 20:10)  HR: 95 (03-15-24 @ 09:00) (67 - 98)  BP: 146/89 (03-15-24 @ 09:00) (95/54 - 146/89)  RR: 18 (03-15-24 @ 05:07) (18 - 18)  SpO2: 94% (03-15-24 @ 09:00) (94% - 97%)  Wt(kg): --Vital Signs Last 24 Hrs  T(C): 36.7 (15 Mar 2024 05:07), Max: 37.8 (14 Mar 2024 20:10)  T(F): 98.1 (15 Mar 2024 05:07), Max: 100 (14 Mar 2024 20:10)  HR: 95 (15 Mar 2024 09:00) (67 - 98)  BP: 146/89 (15 Mar 2024 09:00) (95/54 - 146/89)  BP(mean): --  RR: 18 (15 Mar 2024 05:07) (18 - 18)  SpO2: 94% (15 Mar 2024 09:00) (94% - 97%)    Parameters below as of 15 Mar 2024 09:00  Patient On (Oxygen Delivery Method): room air    MEDICATIONS  (STANDING):  influenza   Vaccine 0.5 milliLiter(s) IntraMuscular once  pantoprazole    Tablet 40 milliGRAM(s) Oral before breakfast  piperacillin/tazobactam IVPB.. 3.375 Gram(s) IV Intermittent every 8 hours    MEDICATIONS  (PRN):  acetaminophen     Tablet .. 650 milliGRAM(s) Oral every 6 hours PRN Temp greater or equal to 38C (100.4F), Mild Pain (1 - 3)  aluminum hydroxide/magnesium hydroxide/simethicone Suspension 30 milliLiter(s) Oral every 4 hours PRN Dyspepsia  ketorolac   Injectable 15 milliGRAM(s) IV Push every 8 hours PRN Moderate Pain (4 - 6)  melatonin 3 milliGRAM(s) Oral at bedtime PRN Insomnia  morphine  - Injectable 2 milliGRAM(s) IV Push every 6 hours PRN Severe Pain (7 - 10)  ondansetron Injectable 4 milliGRAM(s) IV Push every 8 hours PRN Nausea and/or Vomiting      PHYSICAL EXAM:  GENERAL: NAD  EYES: clear conjunctiva; EOMI  ENMT: Moist mucous membranes  NECK: Supple, No JVD, Normal thyroid  CHEST/LUNG: Clear to auscultation bilaterally; No rales, rhonchi, wheezing, or rubs  HEART: S1, S2, Regular rate and rhythm  ABDOMEN: Soft, Nontender, Nondistended; Bowel sounds present, dressing CDi  NEURO: Alert & Oriented X3  EXTREMITIES: No LE edema, no calf tenderness  LYMPH: No lymphadenopathy noted  SKIN: No rashes or lesions    Consultant(s) Notes Reviewed:  [x ] YES  [ ] NO  Care Discussed with Consultants/Other Providers [ x] YES  [ ] NO    LABS:                        10.4   9.73  )-----------( 575      ( 15 Mar 2024 05:56 )             32.0     03-15    136  |  104  |  19<H>  ----------------------------<  137<H>  3.7   |  26  |  0.97    Ca    9.3      15 Mar 2024 05:56        CAPILLARY BLOOD GLUCOSE      Urinalysis Basic - ( 15 Mar 2024 05:56 )    Color: x / Appearance: x / SG: x / pH: x  Gluc: 137 mg/dL / Ketone: x  / Bili: x / Urobili: x   Blood: x / Protein: x / Nitrite: x   Leuk Esterase: x / RBC: x / WBC x   Sq Epi: x / Non Sq Epi: x / Bacteria: x        RADIOLOGY & ADDITIONAL TESTS:    Imaging Personally Reviewed:  [x ] YES  [ ] NO  < from: CT Abdomen and Pelvis w/ IV Cont (03.15.24 @ 11:26) >    ACC: [id number] EXAM:  CT ABDOMEN AND PELVIS IC   ORDERED BY: BALBINA MOREIRA     PROCEDURE DATE:  03/15/2024          INTERPRETATION:  CLINICAL INFORMATION: Follow-up right obturator externus   abscess    COMPARISON: CT abdomen pelvis 3/10/2024.    CONTRAST/COMPLICATIONS:  IV Contrast: Omnipaque 350  90 cc administered   10 cc discarded  Oral Contrast: NONE  Complications: None reported at time of study completion    PROCEDURE:  CT of the Abdomen and Pelvis was performed.  Sagittal and coronal reformats were performed.    FINDINGS:  LOWER CHEST: Within normal limits.    LIVER: Previously mentioned hyperdensity is less conspicuous on this   exam. Continued evaluation with MRI recommended.  BILE DUCTS: Normal caliber.  GALLBLADDER: Cholelithiasis.  SPLEEN: Within normal limits.  PANCREAS: Within normal limits.  ADRENALS: Within normal limits.  KIDNEYS/URETERS: Within normal limits.    BLADDER: Asymmetric bladder wall thickening.  REPRODUCTIVE ORGANS: Postsurgical changes from hysterectomy.    BOWEL: No bowel obstruction. Appendix is normal. Findings diverticulosis  PERITONEUM: No ascites.  VESSELS: Within normal limits.  RETROPERITONEUM/LYMPH NODES: No lymphadenopathy.  ABDOMINAL WALL: Redemonstrated peripherally enhancing collection in the   region of the right obturator externus without significant change in size   measuring up to 4.5 cm using a similar plane of measurement. A secondary   collection is also visualized along the superior posterior aspect of the   pubic symphysis/left pubic body measuring up to approximately 3.3 cm   which was present on the prior study but appears more conspicuous on   today's exam.  BONES: Within normal limits.    IMPRESSION:  Stable right obturator externus enhancing collection.    A secondcollection is is now apparent along the posterior aspect of the   pubic symphysis/left pubic body measuring up to 3.3 cm. previously this   region demonstrated some inflammatory phlegmonous change without definite   collection.        --- End of Report ---            ABDIAS HIDALGO MD; Attending Radiologist  This document has been electronically signed. Mar 15 2024 12:10PM    < end of copied text >

## 2024-03-15 NOTE — PROGRESS NOTE ADULT - PROBLEM SELECTOR PLAN 1
CT shows a 5.3 cm complex collection in the right obturator externus medially indicative of abscess  s/p aspiration by IR on 3/11  wound Cx + for Moderate Streptococcus  cont zosyn  pending sensitivities  - f/u repeat CT scan   ID Dr. Cline   Surgery following

## 2024-03-15 NOTE — PROGRESS NOTE ADULT - ASSESSMENT
58F with pmhx cervical CA  with right groin ill-defined collection s/p drainage of 1cc by IR 3/11/24  sample sent for culture and cytology  Afebrile, no leukocytosis     no acute surgical intervention  f/u culture prelim is strep B  Continue ABX   Ambulate as tolerated  Can consider repeat imaging   Remainder of care per primary team    discussed with PA covering #[id number]

## 2024-03-15 NOTE — PROGRESS NOTE ADULT - ASSESSMENT
right groin/medial thigh abscess collection  s/p an image guided aspiration of the collection  GBS noted on culture  patient is on zosyn    today she reported her pain to be 8/10 at this time  today is day 5 of antibiotics currently on zosyn after making adjustment based on the culture result    improvement is slight.  no leukocytosis    await cytology report  would agree with reimage of the area to further evaluate improvement.

## 2024-03-15 NOTE — PROGRESS NOTE ADULT - SUBJECTIVE AND OBJECTIVE BOX
Patient is a 58y old  Female who presents with a chief complaint of R groin abscess (14 Mar 2024 17:38)    pt seen in icu [  ], reg med floor [ x  ], bed [ x ], chair at bedside [   ], a+o x3 [ x ], lethargic [  ],    nad [x  ]      Allergies    No Known Allergies        Vitals    T(F): 98.1 (03-15-24 @ 05:07), Max: 100 (03-14-24 @ 20:10)  HR: 77 (03-15-24 @ 05:07) (67 - 98)  BP: 106/67 (03-15-24 @ 05:07) (95/54 - 124/89)  RR: 18 (03-15-24 @ 05:07) (18 - 18)  SpO2: 96% (03-15-24 @ 05:07) (96% - 97%)  Wt(kg): --  CAPILLARY BLOOD GLUCOSE          Labs                          10.4   9.73  )-----------( 575      ( 15 Mar 2024 05:56 )             32.0       03-14    136  |  106  |  15  ----------------------------<  150<H>  4.0   |  23  |  0.74    Ca    9.2      14 Mar 2024 06:48              .Abscess right groin  03-11 @ 16:50   Moderate Streptococcus agalactiae (Group B) isolated  Group B streptococci are susceptible to ampicillin,  penicillin and cefazolin, but may be resistant to  erythromycin and clindamycin.  --    No polymorphonuclear leukocytes seen per low power field  Rare Gram positive cocci in pairs seen per oil power field      Clean Catch Clean Catch (Midstream)  03-10 @ 21:15   No growth  --  --          Radiology Results      Meds    MEDICATIONS  (STANDING):  influenza   Vaccine 0.5 milliLiter(s) IntraMuscular once  pantoprazole    Tablet 40 milliGRAM(s) Oral before breakfast  piperacillin/tazobactam IVPB.. 3.375 Gram(s) IV Intermittent every 8 hours      MEDICATIONS  (PRN):  acetaminophen     Tablet .. 650 milliGRAM(s) Oral every 6 hours PRN Temp greater or equal to 38C (100.4F), Mild Pain (1 - 3)  aluminum hydroxide/magnesium hydroxide/simethicone Suspension 30 milliLiter(s) Oral every 4 hours PRN Dyspepsia  ketorolac   Injectable 15 milliGRAM(s) IV Push every 8 hours PRN Moderate Pain (4 - 6)  melatonin 3 milliGRAM(s) Oral at bedtime PRN Insomnia  morphine  - Injectable 2 milliGRAM(s) IV Push every 6 hours PRN Severe Pain (7 - 10)  ondansetron Injectable 4 milliGRAM(s) IV Push every 8 hours PRN Nausea and/or Vomiting      Physical Exam    Neuro :  no focal deficits  Respiratory: CTA B/L  CV: RRR, S1S2, no murmurs,   Abdominal: Soft, NT, ND +BS,  Extremities: No edema, + peripheral pulses, Right groin abscess  with puncture site w/o drainage        ASSESSMENT    Right obturator externus muscle Abscess   cystitis  right hepatic density  Hx/o Stage IIB SCC Cervical Cancer s/p chemo/ radiation  2013/14.         PLAN    s/p  IR guided drainage of the Abscess with culture   1 cc of purulent fluid aspirated - sample sent for culture and cytology,   abscess cx with Moderate Streptococcus agalactiae (Group B) isolated Group B   streptococci are susceptible to ampicillin, penicillin and cefazolin, noted above  ucx neg noted above   id f/u   cont zosyn   will poss rept ct on friday for f/u as pt still with c/o pain  surg f/u   -No acute surgical intervention indicated at present time  gyn f/u   s/p chemo and radiation 2013/2014, s/p TANIA/BSO in 2015  new finding consistent with an infection  low suspicion for recurrent disease at this time   cont pain control   nonemergent hepatic protocol MRI to further right hepatic lobe density see on ct abd pelv.  phys tx eval noted and rec phys 2-3x/week; in hospital ~ 2 weeks  rolling walker; toileting 3:1 commode  Home PT;   Pain is current limiting factor in pt participation.   After anticipated decrease in pain during hospital course,   pt will see benefit from the skilled management of a HPT.  cont current meds            Patient is a 58y old  Female who presents with a chief complaint of R groin abscess (14 Mar 2024 17:38)    pt seen in icu [  ], reg med floor [ x  ], bed [ x ], chair at bedside [   ], a+o x3 [ x ], lethargic [  ],    nad [x  ]      Allergies    No Known Allergies        Vitals    T(F): 98.1 (03-15-24 @ 05:07), Max: 100 (03-14-24 @ 20:10)  HR: 77 (03-15-24 @ 05:07) (67 - 98)  BP: 106/67 (03-15-24 @ 05:07) (95/54 - 124/89)  RR: 18 (03-15-24 @ 05:07) (18 - 18)  SpO2: 96% (03-15-24 @ 05:07) (96% - 97%)  Wt(kg): --  CAPILLARY BLOOD GLUCOSE          Labs                          10.4   9.73  )-----------( 575      ( 15 Mar 2024 05:56 )             32.0       03-14    136  |  106  |  15  ----------------------------<  150<H>  4.0   |  23  |  0.74    Ca    9.2      14 Mar 2024 06:48              .Abscess right groin  03-11 @ 16:50   Moderate Streptococcus agalactiae (Group B) isolated  Group B streptococci are susceptible to ampicillin,  penicillin and cefazolin, but may be resistant to  erythromycin and clindamycin.  --    No polymorphonuclear leukocytes seen per low power field  Rare Gram positive cocci in pairs seen per oil power field      Clean Catch Clean Catch (Midstream)  03-10 @ 21:15   No growth  --  --          Radiology Results      Meds    MEDICATIONS  (STANDING):  influenza   Vaccine 0.5 milliLiter(s) IntraMuscular once  pantoprazole    Tablet 40 milliGRAM(s) Oral before breakfast  piperacillin/tazobactam IVPB.. 3.375 Gram(s) IV Intermittent every 8 hours      MEDICATIONS  (PRN):  acetaminophen     Tablet .. 650 milliGRAM(s) Oral every 6 hours PRN Temp greater or equal to 38C (100.4F), Mild Pain (1 - 3)  aluminum hydroxide/magnesium hydroxide/simethicone Suspension 30 milliLiter(s) Oral every 4 hours PRN Dyspepsia  ketorolac   Injectable 15 milliGRAM(s) IV Push every 8 hours PRN Moderate Pain (4 - 6)  melatonin 3 milliGRAM(s) Oral at bedtime PRN Insomnia  morphine  - Injectable 2 milliGRAM(s) IV Push every 6 hours PRN Severe Pain (7 - 10)  ondansetron Injectable 4 milliGRAM(s) IV Push every 8 hours PRN Nausea and/or Vomiting      Physical Exam    Neuro :  no focal deficits  Respiratory: CTA B/L  CV: RRR, S1S2, no murmurs,   Abdominal: Soft, NT, ND +BS,  Extremities: No edema, + peripheral pulses,          ASSESSMENT    Right obturator externus muscle Abscess   cystitis  right hepatic density  Hx/o Stage IIB SCC Cervical Cancer s/p chemo/ radiation  2013/14.         PLAN    s/p  IR guided drainage of the Abscess with culture   1 cc of purulent fluid aspirated - sample sent for culture and cytology,   abscess cx with Moderate Streptococcus agalactiae (Group B) isolated Group B   streptococci are susceptible to ampicillin, penicillin and cefazolin, noted above  ucx neg noted above   id f/u   Continue Zosyn until Radiological resolution of Right obturator externus muscle Abscess  f/u rept ct abd-pelv as pt still with c/o pain  surg f/u   Pt still with severe pain upon external rotation of right hip;   no visibile external signs of soft tissue cellulitis  consider repeat imaging with possible reconsult IR  gyn f/u   s/p chemo and radiation 2013/2014, s/p TANIA/BSO in 2015  new finding consistent with an infection  low suspicion for recurrent disease at this time   patient to follow up outpatient with her gyn-onc Dr. Blanco per patient preference  cont pain control   nonemergent hepatic protocol MRI to further right hepatic lobe density see on ct abd pelv.  phys tx eval noted and rec phys 2-3x/week; in hospital ~ 2 weeks  rolling walker; toileting 3:1 commode  Home PT;   Pain is current limiting factor in pt participation.   After anticipated decrease in pain during hospital course,   pt will see benefit from the skilled management of a HPT.  cont current meds

## 2024-03-15 NOTE — PROGRESS NOTE ADULT - SUBJECTIVE AND OBJECTIVE BOX
INTERVAL HPI/OVERNIGHT EVENTS: NAEO. AVSS. Patient seen and examined at bedside.  States pain is improved. Denies fevers, chills.     Vital Signs Last 24 Hrs  T(C): 36.7 (15 Mar 2024 05:07), Max: 37.8 (14 Mar 2024 20:10)  T(F): 98.1 (15 Mar 2024 05:07), Max: 100 (14 Mar 2024 20:10)  HR: 95 (15 Mar 2024 09:00) (67 - 98)  BP: 146/89 (15 Mar 2024 09:00) (95/54 - 146/89)  BP(mean): --  RR: 18 (15 Mar 2024 05:07) (18 - 18)  SpO2: 94% (15 Mar 2024 09:00) (94% - 97%)    Parameters below as of 15 Mar 2024 09:00  Patient On (Oxygen Delivery Method): room air      I&O's Detail    aluminum hydroxide/magnesium hydroxide/simethicone Suspension 30 milliLiter(s) Oral every 4 hours PRN  pantoprazole    Tablet 40 milliGRAM(s) Oral before breakfast  piperacillin/tazobactam IVPB.. 3.375 Gram(s) IV Intermittent every 8 hours      Physical Exam:  General: AAOx3, No acute distress  HEENT: NC/AT, trachea midline  Respiratory: Nonlabored breathing, equal chest rise b/l   Skin: No jaundice, no icterus  Abdomen: soft,  nondistended, nontender  Extremities: non edematous, no calf pain bilaterally.   Right groin site without erythema or induration, no fluctuance or drainage noted. +tenderness to palpation.     Lines/Drains/Tubes:     Drains/Tubes:       Labs:                        10.4   9.73  )-----------( 575      ( 15 Mar 2024 05:56 )             32.0     03-15    136  |  104  |  19<H>  ----------------------------<  137<H>  3.7   |  26  |  0.97    Ca    9.3      15 Mar 2024 05:56          RADIOLOGY & ADDITIONAL STUDIES:

## 2024-03-16 LAB
CULTURE RESULTS: ABNORMAL
SPECIMEN SOURCE: SIGNIFICANT CHANGE UP

## 2024-03-16 RX ADMIN — PIPERACILLIN AND TAZOBACTAM 25 GRAM(S): 4; .5 INJECTION, POWDER, LYOPHILIZED, FOR SOLUTION INTRAVENOUS at 21:28

## 2024-03-16 RX ADMIN — PANTOPRAZOLE SODIUM 40 MILLIGRAM(S): 20 TABLET, DELAYED RELEASE ORAL at 05:44

## 2024-03-16 RX ADMIN — PIPERACILLIN AND TAZOBACTAM 25 GRAM(S): 4; .5 INJECTION, POWDER, LYOPHILIZED, FOR SOLUTION INTRAVENOUS at 13:26

## 2024-03-16 RX ADMIN — PIPERACILLIN AND TAZOBACTAM 25 GRAM(S): 4; .5 INJECTION, POWDER, LYOPHILIZED, FOR SOLUTION INTRAVENOUS at 05:44

## 2024-03-16 NOTE — PROGRESS NOTE ADULT - SUBJECTIVE AND OBJECTIVE BOX
Patient is seen and examined at the bed side, is afebrile. The repeat CT scan form 3/15/24 " A second collection is now apparent along the posterior aspect of the pubic symphysis/left pubic body measuring up to 3.3 cm. "      REVIEW OF SYSTEMS: All other review systems are negative      ALLERGIES: No Known Allergies      Vital Signs Last 24 Hrs  T(C): 36.8 (16 Mar 2024 13:29), Max: 36.8 (16 Mar 2024 05:24)  T(F): 98.2 (16 Mar 2024 13:29), Max: 98.3 (16 Mar 2024 05:24)  HR: 93 (16 Mar 2024 13:29) (62 - 93)  BP: 122/85 (16 Mar 2024 13:29) (122/85 - 126/85)  BP(mean): --  RR: 18 (16 Mar 2024 13:29) (18 - 18)  SpO2: 96% (16 Mar 2024 13:29) (96% - 97%)    Parameters below as of 16 Mar 2024 13:29  Patient On (Oxygen Delivery Method): room air        PHYSICAL EXAM:  GENERAL: Not in acute distress  CVS: s1 and s2 present  RESP: Not using accessory muscles  GI: Right Groin redness resolving but very tender to touch  EXT: No pedal edema  CNS: Awake and Alert      LABS:  No new Labs                         10.4   9.73  )-----------( 575      ( 15 Mar 2024 05:56 )             32.0                           10.4   9.26  )-----------( 579      ( 14 Mar 2024 06:48 )             32.5              03-15    136  |  104  |  19<H>  ----------------------------<  137<H>  3.7   |  26  |  0.97    Ca    9.3      15 Mar 2024 05:56      03-14    136  |  106  |  15  ----------------------------<  150<H>  4.0   |  23  |  0.74    Ca    9.2      14 Mar 2024 06:48    TPro  7.6  /  Alb  2.4<L>  /  TBili  0.4  /  DBili  x   /  AST  20  /  ALT  54  /  AlkPhos  511<H>  03-13    PT/INR - ( 11 Mar 2024 11:15 )   PT: 12.7 sec;   INR: 1.12 ratio      PTT - ( 11 Mar 2024 11:15 )  PTT:41.8 sec        MEDICATIONS  (STANDING):    influenza   Vaccine 0.5 milliLiter(s) IntraMuscular once  pantoprazole    Tablet 40 milliGRAM(s) Oral before breakfast  piperacillin/tazobactam IVPB.. 3.375 Gram(s) IV Intermittent every 8 hours      RADIOLOGY & ADDITIONAL TESTS:    3/15/24 : CT Abdomen and Pelvis w/ IV Cont (03.15.24 @ 11:26) Stable right obturator externus enhancing collection.    A secondcollection is is now apparent along the posterior aspect of the pubic symphysis/left pubic body measuring up to 3.3 cm. previously this   region demonstrated some inflammatory phlegmonous change without definite collection.      3/10/24: CT Abdomen and Pelvis w/ IV Cont (03.10.24 @ 22:44) New 5.3 cm complex collection in the region of the right obturator   externus muscle medially, most consistent with abscess.    There is asymmetric mural thickening of the urinary bladder with perivesical fat stranding, likely reactively inflamed.    Given the patient's prior history of cervical cancer, follow-up imaging is recommended after resolution of symptoms or within 6-8 weeks.    1.2 cm indeterminate right hepatic lobe hyperdense focus. This can be further characterized with nonemergent hepatic protocol MRI.        MICROBIOLOGY DATA:    Culture - Abscess with Gram Stain (03.11.24 @ 16:50)   Gram Stain:   No polymorphonuclear leukocytes seen per low power field   Rare Gram positive cocci in pairs seen per oil power field  Specimen Source: .Abscess right groin  Culture Results:   Moderate Streptococcus agalactiae (Group B) isolated   Group B streptococci are susceptible to ampicillin,   penicillin and cefazolin, but may be resistant to   erythromycin and clindamycin.      MRSA/MSSA PCR (03.11.24 @ 09:07)   MRSA PCR Result.: NotDetec:     Culture - Urine (03.10.24 @ 21:15)   Specimen Source: Clean Catch Clean Catch (Midstream)  Culture Results:  No growth    Urinalysis + Microscopic Examination (03.10.24 @ 21:15)   pH Urine: 6.0  Urine Appearance: Clear  Color: Yellow  Specific Gravity: 1.010  Protein, Urine: Negative mg/dL  Glucose Qualitative, Urine: Negative mg/dL  Ketone - Urine: Negative mg/dL  Blood, Urine: Negative  Bilirubin: Negative  Urobilinogen: 0.2 mg/dL  Leukocyte Esterase Concentration: Small  Nitrite: Negative  White Blood Cell - Urine: 5 /HPF  Red Blood Cell - Urine: None Seen /HPF  Bacteria: Occasional /HPF  Squamous Epithelial Cells: Present: few  Comment - Urine: Occasional renal tubular epithelial cells present

## 2024-03-16 NOTE — PROGRESS NOTE ADULT - ASSESSMENT
Patient is a 58y old  Female with history of cervical cancer s/p chemo in 2013, s/p hysterectomy in 2015, with a recent negative PET scan 3 weeks ago, now coming in with 2 months h/o worsening right groin pain. One month ago she saw her Gynecologist and Urologist for the pain. Pap smear and biopsies were negative.  The UA was negative at that time. She was told to continue to take Motrin for symptomatic relief, which she has been taking every 8 hours without any relief.  Last week the pain got worse so she went to urgent care where she was given Keflex 500 mg.  She has been taking the Keflex since March 6. The day before admission the pain became worse and so she came to the emergency room for further evaluation. On admission, he has no fever but leukocytosis and the CT abd/pelvis shows a 5.3 cm complex collection in the right obturator externus muscle medially, consistent with an abscess.  The Surgery has seen her and deferred to IR for drainage.  She was started on IV clindamycin and the ID consult requested to assist with further evaluation and antibiotic management.    # Right obturator externus muscle Abscess  - s/p IR guided Aspiration of the abscess  on 3/12/24 - The abscess culture from 3/11 grew Streptococcus agalactiae. - The repeat CT scan form 3/15/24 " A second collection is now apparent along the posterior aspect of the pubic symphysis/left pubic body measuring up to 3.3 cm. "  # Cervical cancer -  s/p chemo in 2013, s/p hysterectomy in 2015,    would recommend:    1. Please IR reevaluation for drianage of the  new collection with a drain in placed    2. Continue Zosyn for now  3. Monitor kidney function   4. OOb to chair     d/w Patient and  family at the bed side and Patient     Attending Attestation:    Spent more than 35 minutes on total encounter, more than 50 % of the visit was spent counseling and/or coordinating care by the Attending physician.

## 2024-03-17 LAB
ANION GAP SERPL CALC-SCNC: 8 MMOL/L — SIGNIFICANT CHANGE UP (ref 5–17)
BUN SERPL-MCNC: 16 MG/DL — SIGNIFICANT CHANGE UP (ref 7–18)
CALCIUM SERPL-MCNC: 9.4 MG/DL — SIGNIFICANT CHANGE UP (ref 8.4–10.5)
CHLORIDE SERPL-SCNC: 105 MMOL/L — SIGNIFICANT CHANGE UP (ref 96–108)
CO2 SERPL-SCNC: 21 MMOL/L — LOW (ref 22–31)
CREAT SERPL-MCNC: 0.89 MG/DL — SIGNIFICANT CHANGE UP (ref 0.5–1.3)
EGFR: 74 ML/MIN/1.73M2 — SIGNIFICANT CHANGE UP
GLUCOSE SERPL-MCNC: 202 MG/DL — HIGH (ref 70–99)
HCT VFR BLD CALC: 34.9 % — SIGNIFICANT CHANGE UP (ref 34.5–45)
HGB BLD-MCNC: 11.4 G/DL — LOW (ref 11.5–15.5)
MCHC RBC-ENTMCNC: 29.2 PG — SIGNIFICANT CHANGE UP (ref 27–34)
MCHC RBC-ENTMCNC: 32.7 GM/DL — SIGNIFICANT CHANGE UP (ref 32–36)
MCV RBC AUTO: 89.3 FL — SIGNIFICANT CHANGE UP (ref 80–100)
NRBC # BLD: 0 /100 WBCS — SIGNIFICANT CHANGE UP (ref 0–0)
PLATELET # BLD AUTO: 589 K/UL — HIGH (ref 150–400)
POTASSIUM SERPL-MCNC: 3.5 MMOL/L — SIGNIFICANT CHANGE UP (ref 3.5–5.3)
POTASSIUM SERPL-SCNC: 3.5 MMOL/L — SIGNIFICANT CHANGE UP (ref 3.5–5.3)
RBC # BLD: 3.91 M/UL — SIGNIFICANT CHANGE UP (ref 3.8–5.2)
RBC # FLD: 12.9 % — SIGNIFICANT CHANGE UP (ref 10.3–14.5)
SODIUM SERPL-SCNC: 134 MMOL/L — LOW (ref 135–145)
WBC # BLD: 8.31 K/UL — SIGNIFICANT CHANGE UP (ref 3.8–10.5)
WBC # FLD AUTO: 8.31 K/UL — SIGNIFICANT CHANGE UP (ref 3.8–10.5)

## 2024-03-17 RX ADMIN — PANTOPRAZOLE SODIUM 40 MILLIGRAM(S): 20 TABLET, DELAYED RELEASE ORAL at 07:39

## 2024-03-17 RX ADMIN — PIPERACILLIN AND TAZOBACTAM 25 GRAM(S): 4; .5 INJECTION, POWDER, LYOPHILIZED, FOR SOLUTION INTRAVENOUS at 05:03

## 2024-03-17 RX ADMIN — PIPERACILLIN AND TAZOBACTAM 25 GRAM(S): 4; .5 INJECTION, POWDER, LYOPHILIZED, FOR SOLUTION INTRAVENOUS at 22:34

## 2024-03-17 RX ADMIN — PIPERACILLIN AND TAZOBACTAM 25 GRAM(S): 4; .5 INJECTION, POWDER, LYOPHILIZED, FOR SOLUTION INTRAVENOUS at 13:45

## 2024-03-17 NOTE — PROGRESS NOTE ADULT - ASSESSMENT
Patient is a 58y old  Female with history of cervical cancer s/p chemo in 2013, s/p hysterectomy in 2015, with a recent negative PET scan 3 weeks ago, now coming in with 2 months h/o worsening right groin pain. One month ago she saw her Gynecologist and Urologist for the pain. Pap smear and biopsies were negative.  The UA was negative at that time. She was told to continue to take Motrin for symptomatic relief, which she has been taking every 8 hours without any relief.  Last week the pain got worse so she went to urgent care where she was given Keflex 500 mg.  She has been taking the Keflex since March 6. The day before admission the pain became worse and so she came to the emergency room for further evaluation. On admission, he has no fever but leukocytosis and the CT abd/pelvis shows a 5.3 cm complex collection in the right obturator externus muscle medially, consistent with an abscess.  The Surgery has seen her and deferred to IR for drainage.  She was started on IV clindamycin and the ID consult requested to assist with further evaluation and antibiotic management.    # Right obturator externus muscle Abscess  - s/p IR guided Aspiration of the abscess  on 3/12/24 - The abscess culture from 3/11 grew Streptococcus agalactiae. - The repeat CT scan form 3/15/24 " A second collection is now apparent along the posterior aspect of the pubic symphysis/left pubic body measuring up to 3.3 cm. "  # Cervical cancer -  s/p chemo in 2013, s/p hysterectomy in 2015,    would recommend:    1.  IR reevaluation for drianage of the  new collection with a drain in placed    2. Continue Zosyn for now  3. Monitor kidney function   4. OOb to chair     d/w DR. Arenas and Patient     Attending Attestation:    Spent more than 35 minutes on total encounter, more than 50 % of the visit was spent counseling and/or coordinating care by the Attending physician.

## 2024-03-17 NOTE — PROGRESS NOTE ADULT - SUBJECTIVE AND OBJECTIVE BOX
Patient is seen and examined at the bed side, is afebrile.  She mentioned feeling better, tolerating Zosyn well.      REVIEW OF SYSTEMS: All other review systems are negative      ALLERGIES: No Known Allergies      Vital Signs Last 24 Hrs  T(C): 36.8 (17 Mar 2024 13:21), Max: 36.9 (17 Mar 2024 05:05)  T(F): 98.2 (17 Mar 2024 13:21), Max: 98.4 (17 Mar 2024 05:05)  HR: 87 (17 Mar 2024 13:21) (77 - 94)  BP: 136/90 (17 Mar 2024 13:21) (118/80 - 136/90)  BP(mean): --  RR: 17 (17 Mar 2024 13:21) (17 - 17)  SpO2: 95% (17 Mar 2024 13:21) (95% - 95%)    Parameters below as of 17 Mar 2024 13:21  Patient On (Oxygen Delivery Method): room air        PHYSICAL EXAM:  GENERAL: Not in acute distress  CVS: s1 and s2 present  RESP: Not using accessory muscles  GI: Right Groin redness resolving and tenderness is improving  EXT: No pedal edema  CNS: Awake and Alert      LABS:                          11.4   8.31  )-----------( 589      ( 17 Mar 2024 08:35 )             34.9                           10.4   9.73  )-----------( 575      ( 15 Mar 2024 05:56 )             32.0                03-17    134<L>  |  105  |  16  ----------------------------<  202<H>  3.5   |  21<L>  |  0.89    Ca    9.4      17 Mar 2024 08:35        03-15    136  |  104  |  19<H>  ----------------------------<  137<H>  3.7   |  26  |  0.97    Ca    9.3      15 Mar 2024 05:56  TPro  7.6  /  Alb  2.4<L>  /  TBili  0.4  /  DBili  x   /  AST  20  /  ALT  54  /  AlkPhos  511<H>  03-13    PT/INR - ( 11 Mar 2024 11:15 )   PT: 12.7 sec;   INR: 1.12 ratio      PTT - ( 11 Mar 2024 11:15 )  PTT:41.8 sec        MEDICATIONS  (STANDING):    influenza   Vaccine 0.5 milliLiter(s) IntraMuscular once  pantoprazole    Tablet 40 milliGRAM(s) Oral before breakfast  piperacillin/tazobactam IVPB.. 3.375 Gram(s) IV Intermittent every 8 hours        RADIOLOGY & ADDITIONAL TESTS:    3/15/24 : CT Abdomen and Pelvis w/ IV Cont (03.15.24 @ 11:26) Stable right obturator externus enhancing collection.    A secondcollection is is now apparent along the posterior aspect of the pubic symphysis/left pubic body measuring up to 3.3 cm. previously this   region demonstrated some inflammatory phlegmonous change without definite collection.      3/10/24: CT Abdomen and Pelvis w/ IV Cont (03.10.24 @ 22:44) New 5.3 cm complex collection in the region of the right obturator   externus muscle medially, most consistent with abscess.    There is asymmetric mural thickening of the urinary bladder with perivesical fat stranding, likely reactively inflamed.    Given the patient's prior history of cervical cancer, follow-up imaging is recommended after resolution of symptoms or within 6-8 weeks.    1.2 cm indeterminate right hepatic lobe hyperdense focus. This can be further characterized with nonemergent hepatic protocol MRI.        MICROBIOLOGY DATA:    Culture - Abscess with Gram Stain (03.11.24 @ 16:50)   Gram Stain:   No polymorphonuclear leukocytes seen per low power field   Rare Gram positive cocci in pairs seen per oil power field  Specimen Source: .Abscess right groin  Culture Results:   Moderate Streptococcus agalactiae (Group B) isolated   Group B streptococci are susceptible to ampicillin,   penicillin and cefazolin, but may be resistant to   erythromycin and clindamycin.      MRSA/MSSA PCR (03.11.24 @ 09:07)   MRSA PCR Result.: NotDetec:     Culture - Urine (03.10.24 @ 21:15)   Specimen Source: Clean Catch Clean Catch (Midstream)  Culture Results:  No growth    Urinalysis + Microscopic Examination (03.10.24 @ 21:15)   pH Urine: 6.0  Urine Appearance: Clear  Color: Yellow  Specific Gravity: 1.010  Protein, Urine: Negative mg/dL  Glucose Qualitative, Urine: Negative mg/dL  Ketone - Urine: Negative mg/dL  Blood, Urine: Negative  Bilirubin: Negative  Urobilinogen: 0.2 mg/dL  Leukocyte Esterase Concentration: Small  Nitrite: Negative  White Blood Cell - Urine: 5 /HPF  Red Blood Cell - Urine: None Seen /HPF  Bacteria: Occasional /HPF  Squamous Epithelial Cells: Present: few  Comment - Urine: Occasional renal tubular epithelial cells present

## 2024-03-17 NOTE — PROGRESS NOTE ADULT - SUBJECTIVE AND OBJECTIVE BOX
Patient is a 58y old  Female who presents with a chief complaint of R groin abscess (16 Mar 2024 15:54)    pt seen in icu [  ], reg med floor [ x  ], bed [ x ], chair at bedside [   ], a+o x3 [ x ], lethargic [  ],    nad [x  ]      Allergies    No Known Allergies        Vitals    T(F): 98.4 (03-17-24 @ 05:05), Max: 98.4 (03-17-24 @ 05:05)  HR: 77 (03-17-24 @ 05:05) (77 - 94)  BP: 118/80 (03-17-24 @ 05:05) (118/80 - 131/88)  RR: 17 (03-17-24 @ 05:05) (17 - 18)  SpO2: 95% (03-17-24 @ 05:05) (95% - 96%)  Wt(kg): --  CAPILLARY BLOOD GLUCOSE          Labs                      .Abscess right groin  03-11 @ 16:50   Moderate Streptococcus agalactiae (Group B) isolated  Group B streptococci are susceptible to ampicillin,  penicillin and cefazolin, but may be resistant to  erythromycin and clindamycin.  --    No polymorphonuclear leukocytes seen per low power field  Rare Gram positive cocci in pairs seen per oil power field      Clean Catch Clean Catch (Midstream)  03-10 @ 21:15   No growth  --  --          Radiology Results      Meds    MEDICATIONS  (STANDING):  influenza   Vaccine 0.5 milliLiter(s) IntraMuscular once  pantoprazole    Tablet 40 milliGRAM(s) Oral before breakfast  piperacillin/tazobactam IVPB.. 3.375 Gram(s) IV Intermittent every 8 hours      MEDICATIONS  (PRN):  acetaminophen     Tablet .. 650 milliGRAM(s) Oral every 6 hours PRN Temp greater or equal to 38C (100.4F), Mild Pain (1 - 3)  aluminum hydroxide/magnesium hydroxide/simethicone Suspension 30 milliLiter(s) Oral every 4 hours PRN Dyspepsia  melatonin 3 milliGRAM(s) Oral at bedtime PRN Insomnia  morphine  - Injectable 2 milliGRAM(s) IV Push every 6 hours PRN Severe Pain (7 - 10)  ondansetron Injectable 4 milliGRAM(s) IV Push every 8 hours PRN Nausea and/or Vomiting      Physical Exam    Neuro :  no focal deficits  Respiratory: CTA B/L  CV: RRR, S1S2, no murmurs,   Abdominal: Soft, NT, ND +BS,  Extremities: No edema, + peripheral pulses,          ASSESSMENT    Right obturator externus muscle Abscess   cystitis  right hepatic density  Hx/o Stage IIB SCC Cervical Cancer s/p chemo/ radiation  2013/14.         PLAN    s/p  IR guided drainage of the Abscess with culture   1 cc of purulent fluid aspirated - sample sent for culture and cytology,   abscess cx with Moderate Streptococcus agalactiae (Group B) isolated Group B   streptococci are susceptible to ampicillin, penicillin and cefazolin, noted above  ucx neg noted above   rept ct abd-pelv with Stable right obturator externus enhancing collection.  A second collection is is now apparent along the posterior aspect of the   pubic symphysis/left pubic body measuring up to 3.3 cm. previously this   region demonstrated some inflammatory phlegmonous change without definite   collection noted above.   id f/u   Continue Zosyn until Radiological resolution of Right obturator externus muscle Abscess   surg f/u   no acute surgical intervention  f/u culture prelim is strep B  Continue ABX   Ambulate as tolerated  Can consider repeat imaging   gyn f/u   s/p chemo and radiation 2013/2014, s/p TANIA/BSO in 2015  new finding consistent with an infection  low suspicion for recurrent disease at this time   await cytology report  would agree with reimage of the area to further evaluate improvement.  patient to follow up outpatient with her gyn-onc Dr. Blanco per patient preference  cont pain control   nonemergent hepatic protocol MRI to further right hepatic lobe density see on ct abd pelv.  phys tx eval noted and rec phys 2-3x/week; in hospital ~ 2 weeks  rolling walker; toileting 3:1 commode  Home PT;   Pain is current limiting factor in pt participation.   After anticipated decrease in pain during hospital course,   pt will see benefit from the skilled management of a HPT.  cont current meds        Patient is a 58y old  Female who presents with a chief complaint of R groin abscess (16 Mar 2024 15:54)    pt seen in icu [  ], reg med floor [ x  ], bed [ x ], chair at bedside [   ], a+o x3 [ x ], lethargic [  ],    nad [x  ]      Allergies    No Known Allergies        Vitals    T(F): 98.4 (03-17-24 @ 05:05), Max: 98.4 (03-17-24 @ 05:05)  HR: 77 (03-17-24 @ 05:05) (77 - 94)  BP: 118/80 (03-17-24 @ 05:05) (118/80 - 131/88)  RR: 17 (03-17-24 @ 05:05) (17 - 18)  SpO2: 95% (03-17-24 @ 05:05) (95% - 96%)  Wt(kg): --  CAPILLARY BLOOD GLUCOSE          Labs                      .Abscess right groin  03-11 @ 16:50   Moderate Streptococcus agalactiae (Group B) isolated  Group B streptococci are susceptible to ampicillin,  penicillin and cefazolin, but may be resistant to  erythromycin and clindamycin.  --    No polymorphonuclear leukocytes seen per low power field  Rare Gram positive cocci in pairs seen per oil power field      Clean Catch Clean Catch (Midstream)  03-10 @ 21:15   No growth  --  --          Radiology Results      Meds    MEDICATIONS  (STANDING):  influenza   Vaccine 0.5 milliLiter(s) IntraMuscular once  pantoprazole    Tablet 40 milliGRAM(s) Oral before breakfast  piperacillin/tazobactam IVPB.. 3.375 Gram(s) IV Intermittent every 8 hours      MEDICATIONS  (PRN):  acetaminophen     Tablet .. 650 milliGRAM(s) Oral every 6 hours PRN Temp greater or equal to 38C (100.4F), Mild Pain (1 - 3)  aluminum hydroxide/magnesium hydroxide/simethicone Suspension 30 milliLiter(s) Oral every 4 hours PRN Dyspepsia  melatonin 3 milliGRAM(s) Oral at bedtime PRN Insomnia  morphine  - Injectable 2 milliGRAM(s) IV Push every 6 hours PRN Severe Pain (7 - 10)  ondansetron Injectable 4 milliGRAM(s) IV Push every 8 hours PRN Nausea and/or Vomiting      Physical Exam    Neuro :  no focal deficits  Respiratory: CTA B/L  CV: RRR, S1S2, no murmurs,   Abdominal: Soft, NT, ND +BS,  Extremities: No edema, + peripheral pulses,          ASSESSMENT    Right obturator externus muscle Abscess   cystitis  right hepatic density  Hx/o Stage IIB SCC Cervical Cancer s/p chemo/ radiation  2013/14.         PLAN    s/p  IR guided drainage of the Abscess with culture   1 cc of purulent fluid aspirated - sample sent for culture and cytology,   abscess cx with Moderate Streptococcus agalactiae (Group B) isolated Group B   streptococci are susceptible to ampicillin, penicillin and cefazolin, noted above  ucx neg noted above   rept ct abd-pelv with Stable right obturator externus enhancing collection.  A second collection is is now apparent along the posterior aspect of the   pubic symphysis/left pubic body measuring up to 3.3 cm. previously this   region demonstrated some inflammatory phlegmonous change without definite   collection noted    id f/u   IR reevaluation for drianage of the  new collection with a drain in placed    Continue Zosyn for now  surg f/u   no acute surgical intervention  Continue ABX   Ambulate as tolerated  gyn f/u   s/p chemo and radiation 2013/2014, s/p TANIA/BSO in 2015  new finding consistent with an infection  low suspicion for recurrent disease at this time   await cytology report  patient to follow up outpatient with her gyn-onc Dr. Blanco per patient preference  cont pain control   nonemergent hepatic protocol MRI to further right hepatic lobe density see on ct abd pelv.  phys tx eval noted and rec phys 2-3x/week; in hospital ~ 2 weeks  rolling walker; toileting 3:1 commode  Home PT;   Pain is current limiting factor in pt participation.   After anticipated decrease in pain during hospital course,   pt will see benefit from the skilled management of a HPT.  cont current meds

## 2024-03-18 LAB
ANION GAP SERPL CALC-SCNC: 7 MMOL/L — SIGNIFICANT CHANGE UP (ref 5–17)
BUN SERPL-MCNC: 19 MG/DL — HIGH (ref 7–18)
CALCIUM SERPL-MCNC: 9.3 MG/DL — SIGNIFICANT CHANGE UP (ref 8.4–10.5)
CHLORIDE SERPL-SCNC: 107 MMOL/L — SIGNIFICANT CHANGE UP (ref 96–108)
CO2 SERPL-SCNC: 23 MMOL/L — SIGNIFICANT CHANGE UP (ref 22–31)
CREAT SERPL-MCNC: 0.84 MG/DL — SIGNIFICANT CHANGE UP (ref 0.5–1.3)
EGFR: 80 ML/MIN/1.73M2 — SIGNIFICANT CHANGE UP
GLUCOSE SERPL-MCNC: 137 MG/DL — HIGH (ref 70–99)
HCT VFR BLD CALC: 32.9 % — LOW (ref 34.5–45)
HGB BLD-MCNC: 11 G/DL — LOW (ref 11.5–15.5)
MCHC RBC-ENTMCNC: 29.4 PG — SIGNIFICANT CHANGE UP (ref 27–34)
MCHC RBC-ENTMCNC: 33.4 GM/DL — SIGNIFICANT CHANGE UP (ref 32–36)
MCV RBC AUTO: 88 FL — SIGNIFICANT CHANGE UP (ref 80–100)
NON-GYNECOLOGICAL CYTOLOGY STUDY: SIGNIFICANT CHANGE UP
NRBC # BLD: 0 /100 WBCS — SIGNIFICANT CHANGE UP (ref 0–0)
PLATELET # BLD AUTO: 540 K/UL — HIGH (ref 150–400)
POTASSIUM SERPL-MCNC: 3.5 MMOL/L — SIGNIFICANT CHANGE UP (ref 3.5–5.3)
POTASSIUM SERPL-SCNC: 3.5 MMOL/L — SIGNIFICANT CHANGE UP (ref 3.5–5.3)
RBC # BLD: 3.74 M/UL — LOW (ref 3.8–5.2)
RBC # FLD: 12.9 % — SIGNIFICANT CHANGE UP (ref 10.3–14.5)
SODIUM SERPL-SCNC: 137 MMOL/L — SIGNIFICANT CHANGE UP (ref 135–145)
WBC # BLD: 8.92 K/UL — SIGNIFICANT CHANGE UP (ref 3.8–10.5)
WBC # FLD AUTO: 8.92 K/UL — SIGNIFICANT CHANGE UP (ref 3.8–10.5)

## 2024-03-18 RX ADMIN — PANTOPRAZOLE SODIUM 40 MILLIGRAM(S): 20 TABLET, DELAYED RELEASE ORAL at 05:40

## 2024-03-18 RX ADMIN — PIPERACILLIN AND TAZOBACTAM 25 GRAM(S): 4; .5 INJECTION, POWDER, LYOPHILIZED, FOR SOLUTION INTRAVENOUS at 21:13

## 2024-03-18 RX ADMIN — PIPERACILLIN AND TAZOBACTAM 25 GRAM(S): 4; .5 INJECTION, POWDER, LYOPHILIZED, FOR SOLUTION INTRAVENOUS at 13:22

## 2024-03-18 RX ADMIN — PIPERACILLIN AND TAZOBACTAM 25 GRAM(S): 4; .5 INJECTION, POWDER, LYOPHILIZED, FOR SOLUTION INTRAVENOUS at 05:40

## 2024-03-18 NOTE — PROGRESS NOTE ADULT - SUBJECTIVE AND OBJECTIVE BOX
NP Note discussed with  primary attending    Patient is a 58y old  Female who presents with a chief complaint of R groin abscess (18 Mar 2024 06:32)      INTERVAL HPI/OVERNIGHT EVENTS: no new complaints, pt seen at bedside, pleasant No pain noted. IR was consulted for second abscess drain.         MEDICATIONS  (STANDING):  influenza   Vaccine 0.5 milliLiter(s) IntraMuscular once  pantoprazole    Tablet 40 milliGRAM(s) Oral before breakfast  piperacillin/tazobactam IVPB.. 3.375 Gram(s) IV Intermittent every 8 hours    MEDICATIONS  (PRN):  acetaminophen     Tablet .. 650 milliGRAM(s) Oral every 6 hours PRN Temp greater or equal to 38C (100.4F), Mild Pain (1 - 3)  aluminum hydroxide/magnesium hydroxide/simethicone Suspension 30 milliLiter(s) Oral every 4 hours PRN Dyspepsia  melatonin 3 milliGRAM(s) Oral at bedtime PRN Insomnia  morphine  - Injectable 2 milliGRAM(s) IV Push every 6 hours PRN Severe Pain (7 - 10)  ondansetron Injectable 4 milliGRAM(s) IV Push every 8 hours PRN Nausea and/or Vomiting      __________________________________________________  REVIEW OF SYSTEMS:    CONSTITUTIONAL: No fever,   EYES: no acute visual disturbances  NECK: No pain or stiffness  RESPIRATORY: No cough; No shortness of breath  CARDIOVASCULAR: No chest pain, no palpitations  GASTROINTESTINAL: No pain. No nausea or vomiting; No diarrhea   NEUROLOGICAL: No headache or numbness, no tremors  MUSCULOSKELETAL: Generalized weakness   GENITOURINARY: no dysuria, no frequency, no hesitancy  PSYCHIATRY: no depression , no anxiety  ALL OTHER  ROS negative        Vital Signs Last 24 Hrs  T(C): 37 (18 Mar 2024 05:05), Max: 37 (18 Mar 2024 05:05)  T(F): 98.6 (18 Mar 2024 05:05), Max: 98.6 (18 Mar 2024 05:05)  HR: 74 (18 Mar 2024 05:05) (74 - 89)  BP: 123/78 (18 Mar 2024 05:05) (123/78 - 136/90)  BP(mean): 96 (17 Mar 2024 20:19) (96 - 96)  RR: 18 (18 Mar 2024 05:05) (17 - 18)  SpO2: 95% (18 Mar 2024 05:05) (95% - 96%)    Parameters below as of 18 Mar 2024 05:05  Patient On (Oxygen Delivery Method): room air        ________________________________________________  PHYSICAL EXAM:  GENERAL: NAD  HEENT: Normocephalic;  conjunctivae and sclerae clear; moist mucous membranes;   NECK : supple  CHEST/LUNG: diminished to auscultation bilaterally with good air entry   HEART: S1 S2  regular; no murmurs, gallops or rubs  ABDOMEN: Soft, Nontender, Nondistended; Bowel sounds present  EXTREMITIES: no cyanosis; no edema; no calf tenderness  SKIN: warm and dry; no rash  NERVOUS SYSTEM:  Awake and alert; Oriented  to place, person and time ; no new deficits    _________________________________________________  LABS:                        11.0   8.92  )-----------( 540      ( 18 Mar 2024 07:23 )             32.9     03-18    137  |  107  |  19<H>  ----------------------------<  137<H>  3.5   |  23  |  0.84    Ca    9.3      18 Mar 2024 07:23        Urinalysis Basic - ( 18 Mar 2024 07:23 )    Color: x / Appearance: x / SG: x / pH: x  Gluc: 137 mg/dL / Ketone: x  / Bili: x / Urobili: x   Blood: x / Protein: x / Nitrite: x   Leuk Esterase: x / RBC: x / WBC x   Sq Epi: x / Non Sq Epi: x / Bacteria: x      CAPILLARY BLOOD GLUCOSE            RADIOLOGY & ADDITIONAL TESTS:  < from: CT Abdomen and Pelvis w/ IV Cont (03.15.24 @ 11:26) >  IMPRESSION:  Stable right obturator externus enhancing collection.    A secondcollection is is now apparent along the posterior aspect of the   pubic symphysis/left pubic body measuring up to 3.3 cm. previously this   region demonstrated some inflammatory phlegmonous change without definite   collection.      < end of copied text >      Imaging Personally Reviewed:  YES/NO    Consultant(s) Notes Reviewed:   YES/ No    Care Discussed with Consultants :     Plan of care was discussed with patient and /or primary care giver; all questions and concerns were addressed and care was aligned with patient's wishes.

## 2024-03-18 NOTE — PROGRESS NOTE ADULT - SUBJECTIVE AND OBJECTIVE BOX
Patient is a 58y old  Female who presents with a chief complaint of R groin abscess (17 Mar 2024 14:02)    pt seen in icu [  ], reg med floor [ x  ], bed [ x ], chair at bedside [   ], a+o x3 [ x ], lethargic [  ],    nad [x  ]      Allergies    No Known Allergies        Vitals    T(F): 98.6 (03-18-24 @ 05:05), Max: 98.6 (03-18-24 @ 05:05)  HR: 74 (03-18-24 @ 05:05) (74 - 89)  BP: 123/78 (03-18-24 @ 05:05) (123/78 - 136/90)  RR: 18 (03-18-24 @ 05:05) (17 - 18)  SpO2: 95% (03-18-24 @ 05:05) (95% - 96%)  Wt(kg): --  CAPILLARY BLOOD GLUCOSE          Labs                          11.4   8.31  )-----------( 589      ( 17 Mar 2024 08:35 )             34.9       03-17    134<L>  |  105  |  16  ----------------------------<  202<H>  3.5   |  21<L>  |  0.89    Ca    9.4      17 Mar 2024 08:35              .Abscess right groin  03-11 @ 16:50   Moderate Streptococcus agalactiae (Group B) isolated  Group B streptococci are susceptible to ampicillin,  penicillin and cefazolin, but may be resistant to  erythromycin and clindamycin.  --    No polymorphonuclear leukocytes seen per low power field  Rare Gram positive cocci in pairs seen per oil power field      Clean Catch Clean Catch (Midstream)  03-10 @ 21:15   No growth  --  --          Radiology Results      Meds    MEDICATIONS  (STANDING):  influenza   Vaccine 0.5 milliLiter(s) IntraMuscular once  pantoprazole    Tablet 40 milliGRAM(s) Oral before breakfast  piperacillin/tazobactam IVPB.. 3.375 Gram(s) IV Intermittent every 8 hours      MEDICATIONS  (PRN):  acetaminophen     Tablet .. 650 milliGRAM(s) Oral every 6 hours PRN Temp greater or equal to 38C (100.4F), Mild Pain (1 - 3)  aluminum hydroxide/magnesium hydroxide/simethicone Suspension 30 milliLiter(s) Oral every 4 hours PRN Dyspepsia  melatonin 3 milliGRAM(s) Oral at bedtime PRN Insomnia  morphine  - Injectable 2 milliGRAM(s) IV Push every 6 hours PRN Severe Pain (7 - 10)  ondansetron Injectable 4 milliGRAM(s) IV Push every 8 hours PRN Nausea and/or Vomiting      Physical Exam    Neuro :  no focal deficits  Respiratory: CTA B/L  CV: RRR, S1S2, no murmurs,   Abdominal: Soft, NT, ND +BS,  Extremities: No edema, + peripheral pulses,          ASSESSMENT    Right obturator externus muscle Abscess   cystitis  right hepatic density  Hx/o Stage IIB SCC Cervical Cancer s/p chemo/ radiation  2013/14.         PLAN    s/p  IR guided drainage of the Abscess with culture   1 cc of purulent fluid aspirated - sample sent for culture and cytology,   abscess cx with Moderate Streptococcus agalactiae (Group B) isolated Group B   streptococci are susceptible to ampicillin, penicillin and cefazolin, noted above  ucx neg noted above   rept ct abd-pelv with Stable right obturator externus enhancing collection.  A second collection is is now apparent along the posterior aspect of the   pubic symphysis/left pubic body measuring up to 3.3 cm. previously this   region demonstrated some inflammatory phlegmonous change without definite   collection noted    id f/u   IR reevaluation for drianage of the  new collection with a drain in placed    Continue Zosyn for now  surg f/u   no acute surgical intervention  Continue ABX   Ambulate as tolerated  gyn f/u   s/p chemo and radiation 2013/2014, s/p TANIA/BSO in 2015  new finding consistent with an infection  low suspicion for recurrent disease at this time   await cytology report  patient to follow up outpatient with her gyn-onc Dr. Blanco per patient preference  cont pain control   nonemergent hepatic protocol MRI to further right hepatic lobe density see on ct abd pelv.  phys tx eval noted and rec phys 2-3x/week; in hospital ~ 2 weeks  rolling walker; toileting 3:1 commode  Home PT;   Pain is current limiting factor in pt participation.   After anticipated decrease in pain during hospital course,   pt will see benefit from the skilled management of a HPT.  cont current meds          Patient is a 58y old  Female who presents with a chief complaint of R groin abscess (17 Mar 2024 14:02)    pt seen in icu [  ], reg med floor [ x  ], bed [ x ], chair at bedside [   ], a+o x3 [ x ], lethargic [  ],    nad [x  ]    pt states feeling better     Allergies    No Known Allergies        Vitals    T(F): 98.6 (03-18-24 @ 05:05), Max: 98.6 (03-18-24 @ 05:05)  HR: 74 (03-18-24 @ 05:05) (74 - 89)  BP: 123/78 (03-18-24 @ 05:05) (123/78 - 136/90)  RR: 18 (03-18-24 @ 05:05) (17 - 18)  SpO2: 95% (03-18-24 @ 05:05) (95% - 96%)  Wt(kg): --  CAPILLARY BLOOD GLUCOSE          Labs                          11.4   8.31  )-----------( 589      ( 17 Mar 2024 08:35 )             34.9       03-17    134<L>  |  105  |  16  ----------------------------<  202<H>  3.5   |  21<L>  |  0.89    Ca    9.4      17 Mar 2024 08:35              .Abscess right groin  03-11 @ 16:50   Moderate Streptococcus agalactiae (Group B) isolated  Group B streptococci are susceptible to ampicillin,  penicillin and cefazolin, but may be resistant to  erythromycin and clindamycin.  --    No polymorphonuclear leukocytes seen per low power field  Rare Gram positive cocci in pairs seen per oil power field      Clean Catch Clean Catch (Midstream)  03-10 @ 21:15   No growth  --  --          Radiology Results      Meds    MEDICATIONS  (STANDING):  influenza   Vaccine 0.5 milliLiter(s) IntraMuscular once  pantoprazole    Tablet 40 milliGRAM(s) Oral before breakfast  piperacillin/tazobactam IVPB.. 3.375 Gram(s) IV Intermittent every 8 hours      MEDICATIONS  (PRN):  acetaminophen     Tablet .. 650 milliGRAM(s) Oral every 6 hours PRN Temp greater or equal to 38C (100.4F), Mild Pain (1 - 3)  aluminum hydroxide/magnesium hydroxide/simethicone Suspension 30 milliLiter(s) Oral every 4 hours PRN Dyspepsia  melatonin 3 milliGRAM(s) Oral at bedtime PRN Insomnia  morphine  - Injectable 2 milliGRAM(s) IV Push every 6 hours PRN Severe Pain (7 - 10)  ondansetron Injectable 4 milliGRAM(s) IV Push every 8 hours PRN Nausea and/or Vomiting      Physical Exam    Neuro :  no focal deficits  Respiratory: CTA B/L  CV: RRR, S1S2, no murmurs,   Abdominal: Soft, NT, ND +BS,  Extremities: No edema, + peripheral pulses,          ASSESSMENT    Right obturator externus muscle Abscess   cystitis  right hepatic density  Hx/o Stage IIB SCC Cervical Cancer s/p chemo/ radiation  2013/14.         PLAN    s/p  IR guided drainage of the Abscess with culture   1 cc of purulent fluid aspirated - sample sent for culture and cytology,   abscess cx with Moderate Streptococcus agalactiae (Group B) isolated Group B   streptococci are susceptible to ampicillin, penicillin and cefazolin, noted above  ucx neg noted above  rept ct abd-pelv with Stable right obturator externus enhancing collection.  A second collection is is now apparent along the posterior aspect of the   pubic symphysis/left pubic body measuring up to 3.3 cm. previously this   region demonstrated some inflammatory phlegmonous change without definite   collection noted    id f/u   IR reevaluation for possible drainage of the  new collection with a drain in placed    Continue Zosyn for now  surg f/u   no acute surgical intervention  Continue ABX   Ambulate as tolerated  gyn f/u   s/p chemo and radiation 2013/2014, s/p TANIA/BSO in 2015  new finding consistent with an infection  low suspicion for recurrent disease at this time   await cytology report  patient to follow up outpatient with her gyn-onc Dr. Blanco per patient preference  cont pain control   nonemergent hepatic protocol MRI to further right hepatic lobe density see on ct abd pelv.  phys tx eval noted and rec phys 2-3x/week; in hospital ~ 2 weeks  rolling walker; toileting 3:1 commode  Home PT;   Pain is current limiting factor in pt participation.   After anticipated decrease in pain during hospital course,   pt will see benefit from the skilled management of a HPT.  cont current meds

## 2024-03-18 NOTE — PROGRESS NOTE ADULT - ASSESSMENT
Patient is a 58y old  Female with history of cervical cancer s/p chemo in 2013, s/p hysterectomy in 2015, with a recent negative PET scan 3 weeks ago, now coming in with 2 months h/o worsening right groin pain. One month ago she saw her Gynecologist and Urologist for the pain. Pap smear and biopsies were negative.  The UA was negative at that time. She was told to continue to take Motrin for symptomatic relief, which she has been taking every 8 hours without any relief.  Last week the pain got worse so she went to urgent care where she was given Keflex 500 mg.  She has been taking the Keflex since March 6. The day before admission the pain became worse and so she came to the emergency room for further evaluation. On admission, he has no fever but leukocytosis and the CT abd/pelvis shows a 5.3 cm complex collection in the right obturator externus muscle medially, consistent with an abscess.  The Surgery has seen her and deferred to IR for drainage.  She was started on IV clindamycin and the ID consult requested to assist with further evaluation and antibiotic management.    # Right obturator externus muscle Abscess  - s/p IR guided Aspiration of the abscess  on 3/12/24 - The abscess culture from 3/11 grew Streptococcus agalactiae. - The repeat CT scan form 3/15/24 " A second collection is now apparent along the posterior aspect of the pubic symphysis/left pubic body measuring up to 3.3 cm. "  # Cervical cancer -  s/p chemo in 2013, s/p hysterectomy in 2015,    would recommend:    1.  IR reevaluation for drianage of the  new collection with a drain in placed  is pending   2. Continue Zosyn for now  3. Monitor kidney function   4. OOb to chair     d/w DR. Arenas and covering [place]    Attending Attestation:    Spent more than 35 minutes on total encounter, more than 50 % of the visit was spent counseling and/or coordinating care by the Attending physician.

## 2024-03-18 NOTE — PROGRESS NOTE ADULT - SUBJECTIVE AND OBJECTIVE BOX
Patient is seen and examined at the bed side, is afebrile.  The IR evaluation for new abscess is pending,       REVIEW OF SYSTEMS: All other review systems are negative      ALLERGIES: No Known Allergies      Vital Signs Last 24 Hrs  T(C): 36.8 (18 Mar 2024 14:20), Max: 37 (18 Mar 2024 05:05)  T(F): 98.2 (18 Mar 2024 14:20), Max: 98.6 (18 Mar 2024 05:05)  HR: 82 (18 Mar 2024 14:20) (74 - 89)  BP: 98/63 (18 Mar 2024 14:20) (98/63 - 125/82)  BP(mean): 96 (17 Mar 2024 20:19) (96 - 96)  RR: 16 (18 Mar 2024 14:20) (16 - 18)  SpO2: 96% (18 Mar 2024 14:20) (95% - 96%)    Parameters below as of 18 Mar 2024 14:20  Patient On (Oxygen Delivery Method): room air      PHYSICAL EXAM:  GENERAL: Not in acute distress  CVS: s1 and s2 present  RESP: Not using accessory muscles  GI: Right Groin redness resolving and tenderness is improving  EXT: No pedal edema  CNS: Awake and Alert      LABS:                          11.0   8.92  )-----------( 540      ( 18 Mar 2024 07:23 )             32.9                           11.4   8.31  )-----------( 589      ( 17 Mar 2024 08:35 )             34.9        03-18    137  |  107  |  19<H>  ----------------------------<  137<H>  3.5   |  23  |  0.84    Ca    9.3      18 Mar 2024 07:23             03-17    134<L>  |  105  |  16  ----------------------------<  202<H>  3.5   |  21<L>  |  0.89    Ca    9.4      17 Mar 2024 08:35  TPro  7.6  /  Alb  2.4<L>  /  TBili  0.4  /  DBili  x   /  AST  20  /  ALT  54  /  AlkPhos  511<H>  03-13    PT/INR - ( 11 Mar 2024 11:15 )   PT: 12.7 sec;   INR: 1.12 ratio      PTT - ( 11 Mar 2024 11:15 )  PTT:41.8 sec        MEDICATIONS  (STANDING):    influenza   Vaccine 0.5 milliLiter(s) IntraMuscular once  pantoprazole    Tablet 40 milliGRAM(s) Oral before breakfast  piperacillin/tazobactam IVPB.. 3.375 Gram(s) IV Intermittent every 8 hours        RADIOLOGY & ADDITIONAL TESTS:    3/15/24 : CT Abdomen and Pelvis w/ IV Cont (03.15.24 @ 11:26) Stable right obturator externus enhancing collection.    A secondcollection is is now apparent along the posterior aspect of the pubic symphysis/left pubic body measuring up to 3.3 cm. previously this   region demonstrated some inflammatory phlegmonous change without definite collection.      3/10/24: CT Abdomen and Pelvis w/ IV Cont (03.10.24 @ 22:44) New 5.3 cm complex collection in the region of the right obturator   externus muscle medially, most consistent with abscess.    There is asymmetric mural thickening of the urinary bladder with perivesical fat stranding, likely reactively inflamed.    Given the patient's prior history of cervical cancer, follow-up imaging is recommended after resolution of symptoms or within 6-8 weeks.    1.2 cm indeterminate right hepatic lobe hyperdense focus. This can be further characterized with nonemergent hepatic protocol MRI.        MICROBIOLOGY DATA:    Culture - Abscess with Gram Stain (03.11.24 @ 16:50)   Gram Stain:   No polymorphonuclear leukocytes seen per low power field   Rare Gram positive cocci in pairs seen per oil power field  Specimen Source: .Abscess right groin  Culture Results:   Moderate Streptococcus agalactiae (Group B) isolated   Group B streptococci are susceptible to ampicillin,   penicillin and cefazolin, but may be resistant to   erythromycin and clindamycin.      MRSA/MSSA PCR (03.11.24 @ 09:07)   MRSA PCR Result.: NotDetec:     Culture - Urine (03.10.24 @ 21:15)   Specimen Source: Clean Catch Clean Catch (Midstream)  Culture Results:  No growth    Urinalysis + Microscopic Examination (03.10.24 @ 21:15)   pH Urine: 6.0  Urine Appearance: Clear  Color: Yellow  Specific Gravity: 1.010  Protein, Urine: Negative mg/dL  Glucose Qualitative, Urine: Negative mg/dL  Ketone - Urine: Negative mg/dL  Blood, Urine: Negative  Bilirubin: Negative  Urobilinogen: 0.2 mg/dL  Leukocyte Esterase Concentration: Small  Nitrite: Negative  White Blood Cell - Urine: 5 /HPF  Red Blood Cell - Urine: None Seen /HPF  Bacteria: Occasional /HPF  Squamous Epithelial Cells: Present: few  Comment - Urine: Occasional renal tubular epithelial cells present

## 2024-03-18 NOTE — PROGRESS NOTE ADULT - ASSESSMENT
She is a 58-year-old woman with a remote history of uterine cancer who presents with right-sided groin abscess. s/p aspiration by IR, 1cc purulent fluid 3/11. On IV abx. Cx + for Moderate Streptococcus. on IV zosyn. ID followed. Repeat Ct showed second collection along the posterior aspect of the pubic symphysis/left pubic body measuring up to 3.3 cm. IR consulted today for possible drainage.

## 2024-03-19 LAB
ALBUMIN SERPL ELPH-MCNC: 2.7 G/DL — LOW (ref 3.5–5)
ALP SERPL-CCNC: 441 U/L — HIGH (ref 40–120)
ALT FLD-CCNC: 70 U/L DA — HIGH (ref 10–60)
ANION GAP SERPL CALC-SCNC: 6 MMOL/L — SIGNIFICANT CHANGE UP (ref 5–17)
AST SERPL-CCNC: 47 U/L — HIGH (ref 10–40)
BILIRUB SERPL-MCNC: 0.3 MG/DL — SIGNIFICANT CHANGE UP (ref 0.2–1.2)
BUN SERPL-MCNC: 19 MG/DL — HIGH (ref 7–18)
CALCIUM SERPL-MCNC: 9.6 MG/DL — SIGNIFICANT CHANGE UP (ref 8.4–10.5)
CHLORIDE SERPL-SCNC: 107 MMOL/L — SIGNIFICANT CHANGE UP (ref 96–108)
CO2 SERPL-SCNC: 23 MMOL/L — SIGNIFICANT CHANGE UP (ref 22–31)
CREAT SERPL-MCNC: 0.69 MG/DL — SIGNIFICANT CHANGE UP (ref 0.5–1.3)
EGFR: 99 ML/MIN/1.73M2 — SIGNIFICANT CHANGE UP
GLUCOSE SERPL-MCNC: 130 MG/DL — HIGH (ref 70–99)
HCT VFR BLD CALC: 33.9 % — LOW (ref 34.5–45)
HGB BLD-MCNC: 10.8 G/DL — LOW (ref 11.5–15.5)
MCHC RBC-ENTMCNC: 28.3 PG — SIGNIFICANT CHANGE UP (ref 27–34)
MCHC RBC-ENTMCNC: 31.9 GM/DL — LOW (ref 32–36)
MCV RBC AUTO: 88.7 FL — SIGNIFICANT CHANGE UP (ref 80–100)
NRBC # BLD: 0 /100 WBCS — SIGNIFICANT CHANGE UP (ref 0–0)
PLATELET # BLD AUTO: 533 K/UL — HIGH (ref 150–400)
POTASSIUM SERPL-MCNC: 3.5 MMOL/L — SIGNIFICANT CHANGE UP (ref 3.5–5.3)
POTASSIUM SERPL-SCNC: 3.5 MMOL/L — SIGNIFICANT CHANGE UP (ref 3.5–5.3)
PROT SERPL-MCNC: 7.9 G/DL — SIGNIFICANT CHANGE UP (ref 6–8.3)
RBC # BLD: 3.82 M/UL — SIGNIFICANT CHANGE UP (ref 3.8–5.2)
RBC # FLD: 13 % — SIGNIFICANT CHANGE UP (ref 10.3–14.5)
SODIUM SERPL-SCNC: 136 MMOL/L — SIGNIFICANT CHANGE UP (ref 135–145)
WBC # BLD: 8.11 K/UL — SIGNIFICANT CHANGE UP (ref 3.8–10.5)
WBC # FLD AUTO: 8.11 K/UL — SIGNIFICANT CHANGE UP (ref 3.8–10.5)

## 2024-03-19 RX ORDER — CEFTRIAXONE 500 MG/1
2 INJECTION, POWDER, FOR SOLUTION INTRAMUSCULAR; INTRAVENOUS
Qty: 42 | Refills: 0
Start: 2024-03-19 | End: 2024-04-08

## 2024-03-19 RX ORDER — METRONIDAZOLE 500 MG
1 TABLET ORAL
Qty: 63 | Refills: 0
Start: 2024-03-19 | End: 2024-04-08

## 2024-03-19 RX ADMIN — PIPERACILLIN AND TAZOBACTAM 25 GRAM(S): 4; .5 INJECTION, POWDER, LYOPHILIZED, FOR SOLUTION INTRAVENOUS at 05:33

## 2024-03-19 RX ADMIN — PANTOPRAZOLE SODIUM 40 MILLIGRAM(S): 20 TABLET, DELAYED RELEASE ORAL at 05:33

## 2024-03-19 RX ADMIN — PIPERACILLIN AND TAZOBACTAM 25 GRAM(S): 4; .5 INJECTION, POWDER, LYOPHILIZED, FOR SOLUTION INTRAVENOUS at 13:21

## 2024-03-19 RX ADMIN — PIPERACILLIN AND TAZOBACTAM 25 GRAM(S): 4; .5 INJECTION, POWDER, LYOPHILIZED, FOR SOLUTION INTRAVENOUS at 22:30

## 2024-03-19 NOTE — PROGRESS NOTE ADULT - ASSESSMENT
She is a 58-year-old woman with a remote history of uterine cancer who presents with right-sided groin abscess. s/p aspiration by IR, 1cc purulent fluid 3/11. On IV abx. Cx + for Moderate Streptococcus. on IV zosyn. ID followed. Repeat Ct showed second collection along the posterior aspect of the pubic symphysis/left pubic body measuring up to 3.3 cm. IR consulted for possible drainage. No drainage at this time. Patient will require atleast three weeks of ceftriaxone IV and flagyl antibiotics until 4/9 per ID. Pending midline placement. CSM and sw following for IV infusion services set up.

## 2024-03-19 NOTE — PROGRESS NOTE ADULT - PROBLEM SELECTOR PLAN 1
CT shows a 5.3 cm complex collection in the right obturator externus medially indicative of abscess  s/p aspiration by IR on 3/11   wound Cx + for Moderate Streptococcus  pending sensitivities  cont zosyn abx until discharge   repeat CT scan shows 2nd collection along the posterior aspect of the left pubic body measuring up to 3.3 cm  IR consulted for drainage- no room for drainage   upon dc patient will require a total of 3 weeks ceftriaxone and flagyl IV until 4/9  midline placement   ID Dr. Cline   Surgery following

## 2024-03-19 NOTE — PROGRESS NOTE ADULT - SUBJECTIVE AND OBJECTIVE BOX
Patient is seen and examined at the bed side, is afebrile.  The IR in unable to drain the new abscess due to ? small window.      REVIEW OF SYSTEMS: All other review systems are negative      ALLERGIES: No Known Allergies      Vital Signs Last 24 Hrs  T(C): 36.5 (19 Mar 2024 14:21), Max: 36.8 (19 Mar 2024 05:08)  T(F): 97.7 (19 Mar 2024 14:21), Max: 98.2 (19 Mar 2024 05:08)  HR: 83 (19 Mar 2024 14:21) (76 - 88)  BP: 118/78 (19 Mar 2024 14:21) (99/60 - 120/79)  BP(mean): 89 (19 Mar 2024 10:40) (89 - 89)  RR: 16 (19 Mar 2024 14:21) (16 - 18)  SpO2: 96% (19 Mar 2024 14:21) (95% - 98%)    Parameters below as of 19 Mar 2024 14:21  Patient On (Oxygen Delivery Method): room air        PHYSICAL EXAM:  GENERAL: Not in acute distress  CVS: s1 and s2 present  RESP: Not using accessory muscles  GI: Right Groin redness resolving and tenderness is improving  EXT: No pedal edema  CNS: Awake and Alert      LABS:                          10.8   8.11  )-----------( 533      ( 19 Mar 2024 05:41 )             33.9                           11.0   8.92  )-----------( 540      ( 18 Mar 2024 07:23 )             32.9       03-19    136  |  107  |  19<H>  ----------------------------<  130<H>  3.5   |  23  |  0.69    Ca    9.6      19 Mar 2024 05:41    TPro  7.9  /  Alb  2.7<L>  /  TBili  0.3  /  DBili  x   /  AST  47<H>  /  ALT  70<H>  /  AlkPhos  441<H>  03-19 03-18    137  |  107  |  19<H>  ----------------------------<  137<H>  3.5   |  23  |  0.84    Ca    9.3      18 Mar 2024 07:23    TPro  7.6  /  Alb  2.4<L>  /  TBili  0.4  /  DBili  x   /  AST  20  /  ALT  54  /  AlkPhos  511<H>  03-13    PT/INR - ( 11 Mar 2024 11:15 )   PT: 12.7 sec;   INR: 1.12 ratio      PTT - ( 11 Mar 2024 11:15 )  PTT:41.8 sec        MEDICATIONS  (STANDING):    influenza   Vaccine 0.5 milliLiter(s) IntraMuscular once  pantoprazole    Tablet 40 milliGRAM(s) Oral before breakfast  piperacillin/tazobactam IVPB.. 3.375 Gram(s) IV Intermittent every 8 hours      RADIOLOGY & ADDITIONAL TESTS:    3/15/24 : CT Abdomen and Pelvis w/ IV Cont (03.15.24 @ 11:26) Stable right obturator externus enhancing collection.    A secondcollection is is now apparent along the posterior aspect of the pubic symphysis/left pubic body measuring up to 3.3 cm. previously this   region demonstrated some inflammatory phlegmonous change without definite collection.      3/10/24: CT Abdomen and Pelvis w/ IV Cont (03.10.24 @ 22:44) New 5.3 cm complex collection in the region of the right obturator   externus muscle medially, most consistent with abscess.    There is asymmetric mural thickening of the urinary bladder with perivesical fat stranding, likely reactively inflamed.    Given the patient's prior history of cervical cancer, follow-up imaging is recommended after resolution of symptoms or within 6-8 weeks.    1.2 cm indeterminate right hepatic lobe hyperdense focus. This can be further characterized with nonemergent hepatic protocol MRI.        MICROBIOLOGY DATA:    Culture - Abscess with Gram Stain (03.11.24 @ 16:50)   Gram Stain:   No polymorphonuclear leukocytes seen per low power field   Rare Gram positive cocci in pairs seen per oil power field  Specimen Source: .Abscess right groin  Culture Results:   Moderate Streptococcus agalactiae (Group B) isolated   Group B streptococci are susceptible to ampicillin,   penicillin and cefazolin, but may be resistant to   erythromycin and clindamycin.      MRSA/MSSA PCR (03.11.24 @ 09:07)   MRSA PCR Result.: NotDetec:     Culture - Urine (03.10.24 @ 21:15)   Specimen Source: Clean Catch Clean Catch (Midstream)  Culture Results:  No growth    Urinalysis + Microscopic Examination (03.10.24 @ 21:15)   pH Urine: 6.0  Urine Appearance: Clear  Color: Yellow  Specific Gravity: 1.010  Protein, Urine: Negative mg/dL  Glucose Qualitative, Urine: Negative mg/dL  Ketone - Urine: Negative mg/dL  Blood, Urine: Negative  Bilirubin: Negative  Urobilinogen: 0.2 mg/dL  Leukocyte Esterase Concentration: Small  Nitrite: Negative  White Blood Cell - Urine: 5 /HPF  Red Blood Cell - Urine: None Seen /HPF  Bacteria: Occasional /HPF  Squamous Epithelial Cells: Present: few  Comment - Urine: Occasional renal tubular epithelial cells present                        Patient is seen and examined at the bed side, is afebrile.  The IR in unable to drain the new abscess due to ? small window, ( No official IR note in the system yet )      REVIEW OF SYSTEMS: All other review systems are negative      ALLERGIES: No Known Allergies      Vital Signs Last 24 Hrs  T(C): 36.5 (19 Mar 2024 14:21), Max: 36.8 (19 Mar 2024 05:08)  T(F): 97.7 (19 Mar 2024 14:21), Max: 98.2 (19 Mar 2024 05:08)  HR: 83 (19 Mar 2024 14:21) (76 - 88)  BP: 118/78 (19 Mar 2024 14:21) (99/60 - 120/79)  BP(mean): 89 (19 Mar 2024 10:40) (89 - 89)  RR: 16 (19 Mar 2024 14:21) (16 - 18)  SpO2: 96% (19 Mar 2024 14:21) (95% - 98%)    Parameters below as of 19 Mar 2024 14:21  Patient On (Oxygen Delivery Method): room air        PHYSICAL EXAM:  GENERAL: Not in acute distress  CVS: s1 and s2 present  RESP: Not using accessory muscles  GI: Right Groin redness resolving and tenderness is improving  EXT: No pedal edema  CNS: Awake and Alert      LABS:                          10.8   8.11  )-----------( 533      ( 19 Mar 2024 05:41 )             33.9                           11.0   8.92  )-----------( 540      ( 18 Mar 2024 07:23 )             32.9       03-19    136  |  107  |  19<H>  ----------------------------<  130<H>  3.5   |  23  |  0.69    Ca    9.6      19 Mar 2024 05:41    TPro  7.9  /  Alb  2.7<L>  /  TBili  0.3  /  DBili  x   /  AST  47<H>  /  ALT  70<H>  /  AlkPhos  441<H>  03-19     03-18    137  |  107  |  19<H>  ----------------------------<  137<H>  3.5   |  23  |  0.84    Ca    9.3      18 Mar 2024 07:23    TPro  7.6  /  Alb  2.4<L>  /  TBili  0.4  /  DBili  x   /  AST  20  /  ALT  54  /  AlkPhos  511<H>  03-13    PT/INR - ( 11 Mar 2024 11:15 )   PT: 12.7 sec;   INR: 1.12 ratio      PTT - ( 11 Mar 2024 11:15 )  PTT:41.8 sec        MEDICATIONS  (STANDING):    influenza   Vaccine 0.5 milliLiter(s) IntraMuscular once  pantoprazole    Tablet 40 milliGRAM(s) Oral before breakfast  piperacillin/tazobactam IVPB.. 3.375 Gram(s) IV Intermittent every 8 hours      RADIOLOGY & ADDITIONAL TESTS:    3/15/24 : CT Abdomen and Pelvis w/ IV Cont (03.15.24 @ 11:26) Stable right obturator externus enhancing collection.    A secondcollection is is now apparent along the posterior aspect of the pubic symphysis/left pubic body measuring up to 3.3 cm. previously this   region demonstrated some inflammatory phlegmonous change without definite collection.      3/10/24: CT Abdomen and Pelvis w/ IV Cont (03.10.24 @ 22:44) New 5.3 cm complex collection in the region of the right obturator   externus muscle medially, most consistent with abscess.    There is asymmetric mural thickening of the urinary bladder with perivesical fat stranding, likely reactively inflamed.    Given the patient's prior history of cervical cancer, follow-up imaging is recommended after resolution of symptoms or within 6-8 weeks.    1.2 cm indeterminate right hepatic lobe hyperdense focus. This can be further characterized with nonemergent hepatic protocol MRI.        MICROBIOLOGY DATA:    Culture - Abscess with Gram Stain (03.11.24 @ 16:50)   Gram Stain:   No polymorphonuclear leukocytes seen per low power field   Rare Gram positive cocci in pairs seen per oil power field  Specimen Source: .Abscess right groin  Culture Results:   Moderate Streptococcus agalactiae (Group B) isolated   Group B streptococci are susceptible to ampicillin,   penicillin and cefazolin, but may be resistant to   erythromycin and clindamycin.      MRSA/MSSA PCR (03.11.24 @ 09:07)   MRSA PCR Result.: NotDetec:     Culture - Urine (03.10.24 @ 21:15)   Specimen Source: Clean Catch Clean Catch (Midstream)  Culture Results:  No growth    Urinalysis + Microscopic Examination (03.10.24 @ 21:15)   pH Urine: 6.0  Urine Appearance: Clear  Color: Yellow  Specific Gravity: 1.010  Protein, Urine: Negative mg/dL  Glucose Qualitative, Urine: Negative mg/dL  Ketone - Urine: Negative mg/dL  Blood, Urine: Negative  Bilirubin: Negative  Urobilinogen: 0.2 mg/dL  Leukocyte Esterase Concentration: Small  Nitrite: Negative  White Blood Cell - Urine: 5 /HPF  Red Blood Cell - Urine: None Seen /HPF  Bacteria: Occasional /HPF  Squamous Epithelial Cells: Present: few  Comment - Urine: Occasional renal tubular epithelial cells present

## 2024-03-19 NOTE — PROGRESS NOTE ADULT - SUBJECTIVE AND OBJECTIVE BOX
NP Note discussed with  primary attending    Patient is a 58y old  Female who presents with a chief complaint of R groin abscess (18 Mar 2024 15:15)      INTERVAL HPI/OVERNIGHT EVENTS: no new complaints, pt seen at bedside with right groin abscess. Will need IV antibiotics until 4/9     MEDICATIONS  (STANDING):  influenza   Vaccine 0.5 milliLiter(s) IntraMuscular once  pantoprazole    Tablet 40 milliGRAM(s) Oral before breakfast  piperacillin/tazobactam IVPB.. 3.375 Gram(s) IV Intermittent every 8 hours    MEDICATIONS  (PRN):  acetaminophen     Tablet .. 650 milliGRAM(s) Oral every 6 hours PRN Temp greater or equal to 38C (100.4F), Mild Pain (1 - 3)  aluminum hydroxide/magnesium hydroxide/simethicone Suspension 30 milliLiter(s) Oral every 4 hours PRN Dyspepsia  melatonin 3 milliGRAM(s) Oral at bedtime PRN Insomnia  ondansetron Injectable 4 milliGRAM(s) IV Push every 8 hours PRN Nausea and/or Vomiting      __________________________________________________  REVIEW OF SYSTEMS:    CONSTITUTIONAL: No fever,   EYES: no acute visual disturbances  NECK: No pain or stiffness  RESPIRATORY: No cough; No shortness of breath  CARDIOVASCULAR: No chest pain, no palpitations  GASTROINTESTINAL: No pain. No nausea or vomiting; No diarrhea   NEUROLOGICAL: No headache or numbness, no tremors  MUSCULOSKELETAL: No joint pain, no muscle pain  GENITOURINARY: no dysuria, no frequency, no hesitancy  PSYCHIATRY: no depression , no anxiety  ALL OTHER  ROS negative        Vital Signs Last 24 Hrs  T(C): 36.8 (19 Mar 2024 05:08), Max: 36.8 (18 Mar 2024 14:20)  T(F): 98.2 (19 Mar 2024 05:08), Max: 98.2 (18 Mar 2024 14:20)  HR: 76 (19 Mar 2024 05:08) (76 - 88)  BP: 101/65 (19 Mar 2024 05:08) (98/63 - 120/79)  BP(mean): --  RR: 17 (19 Mar 2024 05:08) (16 - 17)  SpO2: 96% (19 Mar 2024 05:08) (95% - 96%)    Parameters below as of 19 Mar 2024 05:08  Patient On (Oxygen Delivery Method): room air        ________________________________________________  PHYSICAL EXAM:  GENERAL: NAD  HEENT: Normocephalic;  conjunctivae and sclerae clear; moist mucous membranes;   NECK : supple  CHEST/LUNG: Clear to ausculitation bilaterally with good air entry   HEART: S1 S2  regular; no murmurs, gallops or rubs  ABDOMEN: Soft, Nontender, Nondistended; Bowel sounds present  EXTREMITIES: no cyanosis; no edema; no calf tenderness  SKIN: warm and dry; no rash  NERVOUS SYSTEM:  Awake and alert; Oriented  to place, person and time ; no new deficits    _________________________________________________  LABS:                        10.8   8.11  )-----------( 533      ( 19 Mar 2024 05:41 )             33.9     03-19    136  |  107  |  19<H>  ----------------------------<  130<H>  3.5   |  23  |  0.69    Ca    9.6      19 Mar 2024 05:41    TPro  7.9  /  Alb  2.7<L>  /  TBili  0.3  /  DBili  x   /  AST  47<H>  /  ALT  70<H>  /  AlkPhos  441<H>  03-19      Urinalysis Basic - ( 19 Mar 2024 05:41 )    Color: x / Appearance: x / SG: x / pH: x  Gluc: 130 mg/dL / Ketone: x  / Bili: x / Urobili: x   Blood: x / Protein: x / Nitrite: x   Leuk Esterase: x / RBC: x / WBC x   Sq Epi: x / Non Sq Epi: x / Bacteria: x      CAPILLARY BLOOD GLUCOSE            RADIOLOGY & ADDITIONAL TESTS:    Imaging Personally Reviewed:  YES/NO    Consultant(s) Notes Reviewed:   YES/ No    Care Discussed with Consultants :     Plan of care was discussed with patient and /or primary care giver; all questions and concerns were addressed and care was aligned with patient's wishes.     NP Note discussed with  primary attending    Patient is a 58y old  Female who presents with a chief complaint of R groin abscess (18 Mar 2024 15:15)      INTERVAL HPI/OVERNIGHT EVENTS: no new complaints, pt seen at bedside with right groin abscess. Will need IV antibiotics until 4/9   pending infusion services set up. Will l    MEDICATIONS  (STANDING):  influenza   Vaccine 0.5 milliLiter(s) IntraMuscular once  pantoprazole    Tablet 40 milliGRAM(s) Oral before breakfast  piperacillin/tazobactam IVPB.. 3.375 Gram(s) IV Intermittent every 8 hours    MEDICATIONS  (PRN):  acetaminophen     Tablet .. 650 milliGRAM(s) Oral every 6 hours PRN Temp greater or equal to 38C (100.4F), Mild Pain (1 - 3)  aluminum hydroxide/magnesium hydroxide/simethicone Suspension 30 milliLiter(s) Oral every 4 hours PRN Dyspepsia  melatonin 3 milliGRAM(s) Oral at bedtime PRN Insomnia  ondansetron Injectable 4 milliGRAM(s) IV Push every 8 hours PRN Nausea and/or Vomiting      __________________________________________________  REVIEW OF SYSTEMS:    CONSTITUTIONAL: No fever,   EYES: no acute visual disturbances  NECK: No pain or stiffness  RESPIRATORY: No cough; No shortness of breath  CARDIOVASCULAR: No chest pain, no palpitations  GASTROINTESTINAL: No pain. No nausea or vomiting; No diarrhea   NEUROLOGICAL: No headache or numbness, no tremors  MUSCULOSKELETAL: No joint pain, no muscle pain  GENITOURINARY: no dysuria, no frequency, no hesitancy  PSYCHIATRY: no depression , no anxiety  ALL OTHER  ROS negative        Vital Signs Last 24 Hrs  T(C): 36.8 (19 Mar 2024 05:08), Max: 36.8 (18 Mar 2024 14:20)  T(F): 98.2 (19 Mar 2024 05:08), Max: 98.2 (18 Mar 2024 14:20)  HR: 76 (19 Mar 2024 05:08) (76 - 88)  BP: 101/65 (19 Mar 2024 05:08) (98/63 - 120/79)  BP(mean): --  RR: 17 (19 Mar 2024 05:08) (16 - 17)  SpO2: 96% (19 Mar 2024 05:08) (95% - 96%)    Parameters below as of 19 Mar 2024 05:08  Patient On (Oxygen Delivery Method): room air        ________________________________________________  PHYSICAL EXAM:  GENERAL: NAD  HEENT: Normocephalic;  conjunctivae and sclerae clear; moist mucous membranes;   NECK : supple  CHEST/LUNG: Clear to ausculitation bilaterally with good air entry   HEART: S1 S2  regular; no murmurs, gallops or rubs  ABDOMEN: Soft, Nontender, Nondistended; Bowel sounds present  EXTREMITIES: no cyanosis; no edema; no calf tenderness  SKIN: warm and dry; no rash  NERVOUS SYSTEM:  Awake and alert; Oriented  to place, person and time ; no new deficits    _________________________________________________  LABS:                        10.8   8.11  )-----------( 533      ( 19 Mar 2024 05:41 )             33.9     03-19    136  |  107  |  19<H>  ----------------------------<  130<H>  3.5   |  23  |  0.69    Ca    9.6      19 Mar 2024 05:41    TPro  7.9  /  Alb  2.7<L>  /  TBili  0.3  /  DBili  x   /  AST  47<H>  /  ALT  70<H>  /  AlkPhos  441<H>  03-19      Urinalysis Basic - ( 19 Mar 2024 05:41 )    Color: x / Appearance: x / SG: x / pH: x  Gluc: 130 mg/dL / Ketone: x  / Bili: x / Urobili: x   Blood: x / Protein: x / Nitrite: x   Leuk Esterase: x / RBC: x / WBC x   Sq Epi: x / Non Sq Epi: x / Bacteria: x      CAPILLARY BLOOD GLUCOSE            RADIOLOGY & ADDITIONAL TESTS:    Imaging Personally Reviewed:  YES/NO    Consultant(s) Notes Reviewed:   YES/ No    Care Discussed with Consultants :     Plan of care was discussed with patient and /or primary care giver; all questions and concerns were addressed and care was aligned with patient's wishes.     NP Note discussed with  primary attending    Patient is a 58y old  Female who presents with a chief complaint of R groin abscess (18 Mar 2024 15:15)      INTERVAL HPI/OVERNIGHT EVENTS: no new complaints, pt seen at bedside with right groin abscess. Will need IV antibiotics until 4/9   pending infusion services set up. Midline to be placed in.     MEDICATIONS  (STANDING):  influenza   Vaccine 0.5 milliLiter(s) IntraMuscular once  pantoprazole    Tablet 40 milliGRAM(s) Oral before breakfast  piperacillin/tazobactam IVPB.. 3.375 Gram(s) IV Intermittent every 8 hours    MEDICATIONS  (PRN):  acetaminophen     Tablet .. 650 milliGRAM(s) Oral every 6 hours PRN Temp greater or equal to 38C (100.4F), Mild Pain (1 - 3)  aluminum hydroxide/magnesium hydroxide/simethicone Suspension 30 milliLiter(s) Oral every 4 hours PRN Dyspepsia  melatonin 3 milliGRAM(s) Oral at bedtime PRN Insomnia  ondansetron Injectable 4 milliGRAM(s) IV Push every 8 hours PRN Nausea and/or Vomiting      __________________________________________________  REVIEW OF SYSTEMS:    CONSTITUTIONAL: No fever,   EYES: no acute visual disturbances  NECK: No pain or stiffness  RESPIRATORY: No cough; No shortness of breath  CARDIOVASCULAR: No chest pain, no palpitations  GASTROINTESTINAL: No pain. No nausea or vomiting; No diarrhea   NEUROLOGICAL: No headache or numbness, no tremors  MUSCULOSKELETAL: No joint pain, no muscle pain  GENITOURINARY: no dysuria, no frequency, no hesitancy  PSYCHIATRY: no depression , no anxiety  ALL OTHER  ROS negative        Vital Signs Last 24 Hrs  T(C): 36.8 (19 Mar 2024 05:08), Max: 36.8 (18 Mar 2024 14:20)  T(F): 98.2 (19 Mar 2024 05:08), Max: 98.2 (18 Mar 2024 14:20)  HR: 76 (19 Mar 2024 05:08) (76 - 88)  BP: 101/65 (19 Mar 2024 05:08) (98/63 - 120/79)  BP(mean): --  RR: 17 (19 Mar 2024 05:08) (16 - 17)  SpO2: 96% (19 Mar 2024 05:08) (95% - 96%)    Parameters below as of 19 Mar 2024 05:08  Patient On (Oxygen Delivery Method): room air        ________________________________________________  PHYSICAL EXAM:  GENERAL: NAD  HEENT: Normocephalic;  conjunctivae and sclerae clear; moist mucous membranes;   NECK : supple  CHEST/LUNG: Clear to ausculitation bilaterally with good air entry   HEART: S1 S2  regular; no murmurs, gallops or rubs  ABDOMEN: Soft, Nontender, Nondistended; Bowel sounds present  EXTREMITIES: no cyanosis; no edema; no calf tenderness  SKIN: warm and dry; no rash  NERVOUS SYSTEM:  Awake and alert; Oriented  to place, person and time ; no new deficits    _________________________________________________  LABS:                        10.8   8.11  )-----------( 533      ( 19 Mar 2024 05:41 )             33.9     03-19    136  |  107  |  19<H>  ----------------------------<  130<H>  3.5   |  23  |  0.69    Ca    9.6      19 Mar 2024 05:41    TPro  7.9  /  Alb  2.7<L>  /  TBili  0.3  /  DBili  x   /  AST  47<H>  /  ALT  70<H>  /  AlkPhos  441<H>  03-19      Urinalysis Basic - ( 19 Mar 2024 05:41 )    Color: x / Appearance: x / SG: x / pH: x  Gluc: 130 mg/dL / Ketone: x  / Bili: x / Urobili: x   Blood: x / Protein: x / Nitrite: x   Leuk Esterase: x / RBC: x / WBC x   Sq Epi: x / Non Sq Epi: x / Bacteria: x      CAPILLARY BLOOD GLUCOSE            RADIOLOGY & ADDITIONAL TESTS:    < from: CT Abdomen and Pelvis w/ IV Cont (03.15.24 @ 11:26) >  IMPRESSION:  Stable right obturator externus enhancing collection.    A secondcollection is is now apparent along the posterior aspect of the   pubic symphysis/left pubic body measuring up to 3.3 cm. previously this   region demonstrated some inflammatory phlegmonous change without definite   collection.    < end of copied text >      Imaging Personally Reviewed:  YES/NO    Consultant(s) Notes Reviewed:   YES/ No    Care Discussed with Consultants :     Plan of care was discussed with patient and /or primary care giver; all questions and concerns were addressed and care was aligned with patient's wishes.

## 2024-03-19 NOTE — PROGRESS NOTE ADULT - SUBJECTIVE AND OBJECTIVE BOX
Patient is a 58y old  Female who presents with a chief complaint of R groin abscess (19 Mar 2024 10:41)    pt seen in icu [  ], reg med floor [   ], bed [  ], chair at bedside [   ], a+o x3 [  ], lethargic [  ],  nad [  ]    lew [  ], ngt [  ], peg [  ], et tube [  ], cent line [  ], picc line [  ]        Allergies    No Known Allergies        Vitals    T(F): 98.2 (03-19-24 @ 05:08), Max: 98.2 (03-18-24 @ 14:20)  HR: 87 (03-19-24 @ 10:40) (76 - 88)  BP: 117/76 (03-19-24 @ 10:40) (98/63 - 120/79)  RR: 18 (03-19-24 @ 10:40) (16 - 18)  SpO2: 98% (03-19-24 @ 10:40) (95% - 98%)  Wt(kg): --  CAPILLARY BLOOD GLUCOSE          Labs                          10.8   8.11  )-----------( 533      ( 19 Mar 2024 05:41 )             33.9       03-19    136  |  107  |  19<H>  ----------------------------<  130<H>  3.5   |  23  |  0.69    Ca    9.6      19 Mar 2024 05:41    TPro  7.9  /  Alb  2.7<L>  /  TBili  0.3  /  DBili  x   /  AST  47<H>  /  ALT  70<H>  /  AlkPhos  441<H>  03-19            .Abscess right groin  03-11 @ 16:50   Moderate Streptococcus agalactiae (Group B) isolated  Group B streptococci are susceptible to ampicillin,  penicillin and cefazolin, but may be resistant to  erythromycin and clindamycin.  --    No polymorphonuclear leukocytes seen per low power field  Rare Gram positive cocci in pairs seen per oil power field      Clean Catch Clean Catch (Midstream)  03-10 @ 21:15   No growth  --  --          Radiology Results      Meds    MEDICATIONS  (STANDING):  influenza   Vaccine 0.5 milliLiter(s) IntraMuscular once  pantoprazole    Tablet 40 milliGRAM(s) Oral before breakfast  piperacillin/tazobactam IVPB.. 3.375 Gram(s) IV Intermittent every 8 hours      MEDICATIONS  (PRN):  acetaminophen     Tablet .. 650 milliGRAM(s) Oral every 6 hours PRN Temp greater or equal to 38C (100.4F), Mild Pain (1 - 3)  aluminum hydroxide/magnesium hydroxide/simethicone Suspension 30 milliLiter(s) Oral every 4 hours PRN Dyspepsia  melatonin 3 milliGRAM(s) Oral at bedtime PRN Insomnia  ondansetron Injectable 4 milliGRAM(s) IV Push every 8 hours PRN Nausea and/or Vomiting      Physical Exam    Neuro :  no focal deficits  Respiratory: CTA B/L  CV: RRR, S1S2, no murmurs,   Abdominal: Soft, NT, ND +BS,  Extremities: No edema, + peripheral pulses    ASSESSMENT    Abdominal pain    Cervical cancer        PLAN     Patient is a 58y old  Female who presents with a chief complaint of R groin abscess (19 Mar 2024 10:41)    pt seen in icu [  ], reg med floor [ x  ], bed [ x ], chair at bedside [   ], a+o x3 [ x ], lethargic [  ],    nad [x  ]        Allergies    No Known Allergies        Vitals    T(F): 98.2 (03-19-24 @ 05:08), Max: 98.2 (03-18-24 @ 14:20)  HR: 87 (03-19-24 @ 10:40) (76 - 88)  BP: 117/76 (03-19-24 @ 10:40) (98/63 - 120/79)  RR: 18 (03-19-24 @ 10:40) (16 - 18)  SpO2: 98% (03-19-24 @ 10:40) (95% - 98%)  Wt(kg): --  CAPILLARY BLOOD GLUCOSE          Labs                          10.8   8.11  )-----------( 533      ( 19 Mar 2024 05:41 )             33.9       03-19    136  |  107  |  19<H>  ----------------------------<  130<H>  3.5   |  23  |  0.69    Ca    9.6      19 Mar 2024 05:41    TPro  7.9  /  Alb  2.7<L>  /  TBili  0.3  /  DBili  x   /  AST  47<H>  /  ALT  70<H>  /  AlkPhos  441<H>  03-19            .Abscess right groin  03-11 @ 16:50   Moderate Streptococcus agalactiae (Group B) isolated  Group B streptococci are susceptible to ampicillin,  penicillin and cefazolin, but may be resistant to  erythromycin and clindamycin.  --    No polymorphonuclear leukocytes seen per low power field  Rare Gram positive cocci in pairs seen per oil power field      Clean Catch Clean Catch (Midstream)  03-10 @ 21:15   No growth  --  --          Radiology Results      Meds    MEDICATIONS  (STANDING):  influenza   Vaccine 0.5 milliLiter(s) IntraMuscular once  pantoprazole    Tablet 40 milliGRAM(s) Oral before breakfast  piperacillin/tazobactam IVPB.. 3.375 Gram(s) IV Intermittent every 8 hours      MEDICATIONS  (PRN):  acetaminophen     Tablet .. 650 milliGRAM(s) Oral every 6 hours PRN Temp greater or equal to 38C (100.4F), Mild Pain (1 - 3)  aluminum hydroxide/magnesium hydroxide/simethicone Suspension 30 milliLiter(s) Oral every 4 hours PRN Dyspepsia  melatonin 3 milliGRAM(s) Oral at bedtime PRN Insomnia  ondansetron Injectable 4 milliGRAM(s) IV Push every 8 hours PRN Nausea and/or Vomiting      Physical Exam    Neuro :  no focal deficits  Respiratory: CTA B/L  CV: RRR, S1S2, no murmurs,   Abdominal: Soft, NT, ND +BS,  Extremities: No edema, + peripheral pulses,          ASSESSMENT    Right obturator externus muscle Abscess   cystitis  right hepatic density  Hx/o Stage IIB SCC Cervical Cancer s/p chemo/ radiation  2013/14.         PLAN    s/p  IR guided drainage of the Abscess with culture   1 cc of purulent fluid aspirated - sample sent for culture and cytology,   abscess cx with Moderate Streptococcus agalactiae (Group B) isolated Group B   streptococci are susceptible to ampicillin, penicillin and cefazolin, noted above  ucx neg noted above  rept ct abd-pelv with Stable right obturator externus enhancing collection.  A second collection is is now apparent along the posterior aspect of the   pubic symphysis/left pubic body measuring up to 3.3 cm. previously this   region demonstrated some inflammatory phlegmonous change without definite   collection noted    id f/u   IR reevaluation for possible drainage of the  new collection with a drain in placed    Continue Zosyn for now  surg f/u   no acute surgical intervention  Continue ABX   Ambulate as tolerated  gyn f/u   s/p chemo and radiation 2013/2014, s/p TANIA/BSO in 2015  new finding consistent with an infection  low suspicion for recurrent disease at this time   await cytology report  patient to follow up outpatient with her gyn-onc Dr. Blanco per patient preference  cont pain control   nonemergent hepatic protocol MRI to further right hepatic lobe density see on ct abd pelv.  phys tx eval noted and rec phys 2-3x/week; in hospital ~ 2 weeks  rolling walker; toileting 3:1 commode  Home PT;   Pain is current limiting factor in pt participation.   After anticipated decrease in pain during hospital course,   pt will see benefit from the skilled management of a HPT.  cont current meds            Patient is a 58y old  Female who presents with a chief complaint of R groin abscess (19 Mar 2024 10:41)    pt seen in icu [  ], reg med floor [ x  ], bed [ x ], chair at bedside [   ], a+o x3 [ x ], lethargic [  ],    nad [x  ]        Allergies    No Known Allergies        Vitals    T(F): 98.2 (03-19-24 @ 05:08), Max: 98.2 (03-18-24 @ 14:20)  HR: 87 (03-19-24 @ 10:40) (76 - 88)  BP: 117/76 (03-19-24 @ 10:40) (98/63 - 120/79)  RR: 18 (03-19-24 @ 10:40) (16 - 18)  SpO2: 98% (03-19-24 @ 10:40) (95% - 98%)  Wt(kg): --  CAPILLARY BLOOD GLUCOSE          Labs                          10.8   8.11  )-----------( 533      ( 19 Mar 2024 05:41 )             33.9       03-19    136  |  107  |  19<H>  ----------------------------<  130<H>  3.5   |  23  |  0.69    Ca    9.6      19 Mar 2024 05:41    TPro  7.9  /  Alb  2.7<L>  /  TBili  0.3  /  DBili  x   /  AST  47<H>  /  ALT  70<H>  /  AlkPhos  441<H>  03-19            .Abscess right groin  03-11 @ 16:50   Moderate Streptococcus agalactiae (Group B) isolated  Group B streptococci are susceptible to ampicillin,  penicillin and cefazolin, but may be resistant to  erythromycin and clindamycin.  --    No polymorphonuclear leukocytes seen per low power field  Rare Gram positive cocci in pairs seen per oil power field      Clean Catch Clean Catch (Midstream)  03-10 @ 21:15   No growth  --  --          Radiology Results      Meds    MEDICATIONS  (STANDING):  influenza   Vaccine 0.5 milliLiter(s) IntraMuscular once  pantoprazole    Tablet 40 milliGRAM(s) Oral before breakfast  piperacillin/tazobactam IVPB.. 3.375 Gram(s) IV Intermittent every 8 hours      MEDICATIONS  (PRN):  acetaminophen     Tablet .. 650 milliGRAM(s) Oral every 6 hours PRN Temp greater or equal to 38C (100.4F), Mild Pain (1 - 3)  aluminum hydroxide/magnesium hydroxide/simethicone Suspension 30 milliLiter(s) Oral every 4 hours PRN Dyspepsia  melatonin 3 milliGRAM(s) Oral at bedtime PRN Insomnia  ondansetron Injectable 4 milliGRAM(s) IV Push every 8 hours PRN Nausea and/or Vomiting      Physical Exam    Neuro :  no focal deficits  Respiratory: CTA B/L  CV: RRR, S1S2, no murmurs,   Abdominal: Soft, NT, ND +BS,  Extremities: No edema, + peripheral pulses,          ASSESSMENT    Right obturator externus muscle Abscess   cystitis  right hepatic density  Hx/o Stage IIB SCC Cervical Cancer s/p chemo/ radiation  2013/14.         PLAN    s/p  IR guided drainage of the Abscess with culture   1 cc of purulent fluid aspirated - sample sent for culture and cytology,   abscess cx with Moderate Streptococcus agalactiae (Group B) isolated Group B   streptococci are susceptible to ampicillin, penicillin and cefazolin, noted above  ucx neg noted above  rept ct abd-pelv with Stable right obturator externus enhancing collection.  A second collection is is now apparent along the posterior aspect of the   pubic symphysis/left pubic body measuring up to 3.3 cm. previously this   region demonstrated some inflammatory phlegmonous change without definite   collection noted    as per discussion with IR pa, not enough fluid for drainage  id f/u   May change Zosyn to IV Ceftriaxone 2 g daily and Oral Flagyl 500 mg q 8 hours on discharge until Radiological resolution of the Abscess, ( Can prescribe Abx for 3 weeks, 4/9/24 and repeat CT scan in two weeks)   Continue Zosyn while inpatient  pt will need extended dwell or picc line for home abx infusion   surg f/u   no acute surgical intervention  Continue ABX   Ambulate as tolerated  gyn f/u   s/p chemo and radiation 2013/2014, s/p TANIA/BSO in 2015  new finding consistent with an infection  low suspicion for recurrent disease at this time   await cytology report  patient to follow up outpatient with her gyn-onc Dr. Blanco per patient preference  cont pain control   nonemergent hepatic protocol MRI to further right hepatic lobe density see on ct abd pelv.  phys tx eval noted and rec phys 2-3x/week; in hospital ~ 2 weeks  rolling walker; toileting 3:1 commode  Home PT;   Pain is current limiting factor in pt participation.   After anticipated decrease in pain during hospital course,   pt will see benefit from the skilled management of a HPT.  cont current meds   d/c plan pending arrangement for home abx infusion

## 2024-03-19 NOTE — PROGRESS NOTE ADULT - ASSESSMENT
Patient is a 58y old  Female with history of cervical cancer s/p chemo in 2013, s/p hysterectomy in 2015, with a recent negative PET scan 3 weeks ago, now coming in with 2 months h/o worsening right groin pain. One month ago she saw her Gynecologist and Urologist for the pain. Pap smear and biopsies were negative.  The UA was negative at that time. She was told to continue to take Motrin for symptomatic relief, which she has been taking every 8 hours without any relief.  Last week the pain got worse so she went to urgent care where she was given Keflex 500 mg.  She has been taking the Keflex since March 6. The day before admission the pain became worse and so she came to the emergency room for further evaluation. On admission, he has no fever but leukocytosis and the CT abd/pelvis shows a 5.3 cm complex collection in the right obturator externus muscle medially, consistent with an abscess.  The Surgery has seen her and deferred to IR for drainage.  She was started on IV clindamycin and the ID consult requested to assist with further evaluation and antibiotic management.    # Right obturator externus muscle Abscess  - s/p IR guided Aspiration of the abscess  on 3/12/24 - The abscess culture from 3/11 grew Streptococcus agalactiae. - The repeat CT scan form 3/15/24 " A second collection is now apparent along the posterior aspect of the pubic symphysis/left pubic body measuring up to 3.3 cm. "  # Cervical cancer -  s/p chemo in 2013, s/p hysterectomy in 2015,    would recommend:    1. May change Zosyn to IV Ceftriaxone 2 g daily and Oral Flagyl 500 mg q 8 hours on discharge until Radiological resolution of the Abscess, ( Can prescribe Abx for 3 weeks, 4/9/24 and repeat CT scan in two weeks)   2. Continue Zosyn while inpatient  3. Monitor kidney function   4. OOb to chair     d/w  covering Veronica PITTS    Attending Attestation:    Spent more than 35 minutes on total encounter, more than 50 % of the visit was spent counseling and/or coordinating care by the Attending physician.         Patient is a 58y old  Female with history of cervical cancer s/p chemo in 2013, s/p hysterectomy in 2015, with a recent negative PET scan 3 weeks ago, now coming in with 2 months h/o worsening right groin pain. One month ago she saw her Gynecologist and Urologist for the pain. Pap smear and biopsies were negative.  The UA was negative at that time. She was told to continue to take Motrin for symptomatic relief, which she has been taking every 8 hours without any relief.  Last week the pain got worse so she went to urgent care where she was given Keflex 500 mg.  She has been taking the Keflex since March 6. The day before admission the pain became worse and so she came to the emergency room for further evaluation. On admission, he has no fever but leukocytosis and the CT abd/pelvis shows a 5.3 cm complex collection in the right obturator externus muscle medially, consistent with an abscess.  The Surgery has seen her and deferred to IR for drainage.  She was started on IV clindamycin and the ID consult requested to assist with further evaluation and antibiotic management.    # Right obturator externus muscle Abscess  - s/p IR guided Aspiration of the abscess  on 3/12/24 - The abscess culture from 3/11 grew Streptococcus agalactiae. - The repeat CT scan form 3/15/24 " A second collection is now apparent along the posterior aspect of the pubic symphysis/left pubic body measuring up to 3.3 cm. "  # Cervical cancer -  s/p chemo in 2013, s/p hysterectomy in 2015,    would recommend:    1. May change Zosyn to IV Ceftriaxone 2 g daily and Oral Flagyl 500 mg q 8 hours on discharge until Radiological resolution of the Abscess, ( Can prescribe Abx for 3 weeks, 4/9/24 and repeat CT scan in two weeks)   2. Continue Zosyn while inpatient  3. Monitor kidney function   4. OOb to chair     d/w  covering GISSELLE, Veronica, DR. Arenas and Patient's family at the bed side    - ID follow up with DR. Abhishek Gonzalez via Telehealth [phone] and [phone]    Attending Attestation:    Spent more than 35 minutes on total encounter, more than 50 % of the visit was spent counseling and/or coordinating care by the Attending physician.

## 2024-03-20 LAB
ALBUMIN SERPL ELPH-MCNC: 2.8 G/DL — LOW (ref 3.5–5)
ALP SERPL-CCNC: 430 U/L — HIGH (ref 40–120)
ALT FLD-CCNC: 69 U/L DA — HIGH (ref 10–60)
ANION GAP SERPL CALC-SCNC: 5 MMOL/L — SIGNIFICANT CHANGE UP (ref 5–17)
AST SERPL-CCNC: 49 U/L — HIGH (ref 10–40)
BILIRUB SERPL-MCNC: 0.4 MG/DL — SIGNIFICANT CHANGE UP (ref 0.2–1.2)
BUN SERPL-MCNC: 13 MG/DL — SIGNIFICANT CHANGE UP (ref 7–18)
CALCIUM SERPL-MCNC: 9.4 MG/DL — SIGNIFICANT CHANGE UP (ref 8.4–10.5)
CHLORIDE SERPL-SCNC: 106 MMOL/L — SIGNIFICANT CHANGE UP (ref 96–108)
CO2 SERPL-SCNC: 25 MMOL/L — SIGNIFICANT CHANGE UP (ref 22–31)
CREAT SERPL-MCNC: 0.81 MG/DL — SIGNIFICANT CHANGE UP (ref 0.5–1.3)
CRP SERPL-MCNC: 30 MG/L — HIGH
EGFR: 83 ML/MIN/1.73M2 — SIGNIFICANT CHANGE UP
ERYTHROCYTE [SEDIMENTATION RATE] IN BLOOD: 98 MM/HR — HIGH (ref 0–20)
GLUCOSE SERPL-MCNC: 143 MG/DL — HIGH (ref 70–99)
HCT VFR BLD CALC: 33.1 % — LOW (ref 34.5–45)
HGB BLD-MCNC: 10.9 G/DL — LOW (ref 11.5–15.5)
MCHC RBC-ENTMCNC: 28.8 PG — SIGNIFICANT CHANGE UP (ref 27–34)
MCHC RBC-ENTMCNC: 32.9 GM/DL — SIGNIFICANT CHANGE UP (ref 32–36)
MCV RBC AUTO: 87.3 FL — SIGNIFICANT CHANGE UP (ref 80–100)
NRBC # BLD: 0 /100 WBCS — SIGNIFICANT CHANGE UP (ref 0–0)
PLATELET # BLD AUTO: 507 K/UL — HIGH (ref 150–400)
POTASSIUM SERPL-MCNC: 3.7 MMOL/L — SIGNIFICANT CHANGE UP (ref 3.5–5.3)
POTASSIUM SERPL-SCNC: 3.7 MMOL/L — SIGNIFICANT CHANGE UP (ref 3.5–5.3)
PROT SERPL-MCNC: 7.9 G/DL — SIGNIFICANT CHANGE UP (ref 6–8.3)
RBC # BLD: 3.79 M/UL — LOW (ref 3.8–5.2)
RBC # FLD: 13.2 % — SIGNIFICANT CHANGE UP (ref 10.3–14.5)
SODIUM SERPL-SCNC: 136 MMOL/L — SIGNIFICANT CHANGE UP (ref 135–145)
WBC # BLD: 7.38 K/UL — SIGNIFICANT CHANGE UP (ref 3.8–10.5)
WBC # FLD AUTO: 7.38 K/UL — SIGNIFICANT CHANGE UP (ref 3.8–10.5)

## 2024-03-20 RX ORDER — METRONIDAZOLE 500 MG
TABLET ORAL
Refills: 0 | Status: DISCONTINUED | OUTPATIENT
Start: 2024-03-20 | End: 2024-03-20

## 2024-03-20 RX ORDER — PIPERACILLIN AND TAZOBACTAM 4; .5 G/20ML; G/20ML
3.38 INJECTION, POWDER, LYOPHILIZED, FOR SOLUTION INTRAVENOUS EVERY 8 HOURS
Refills: 0 | Status: DISCONTINUED | OUTPATIENT
Start: 2024-03-20 | End: 2024-03-21

## 2024-03-20 RX ORDER — CEFTRIAXONE 500 MG/1
INJECTION, POWDER, FOR SOLUTION INTRAMUSCULAR; INTRAVENOUS
Refills: 0 | Status: DISCONTINUED | OUTPATIENT
Start: 2024-03-20 | End: 2024-03-20

## 2024-03-20 RX ADMIN — PIPERACILLIN AND TAZOBACTAM 25 GRAM(S): 4; .5 INJECTION, POWDER, LYOPHILIZED, FOR SOLUTION INTRAVENOUS at 18:56

## 2024-03-20 RX ADMIN — PIPERACILLIN AND TAZOBACTAM 25 GRAM(S): 4; .5 INJECTION, POWDER, LYOPHILIZED, FOR SOLUTION INTRAVENOUS at 11:20

## 2024-03-20 RX ADMIN — PANTOPRAZOLE SODIUM 40 MILLIGRAM(S): 20 TABLET, DELAYED RELEASE ORAL at 05:18

## 2024-03-20 NOTE — PROGRESS NOTE ADULT - ASSESSMENT
Patient is a 58y old  Female with history of cervical cancer s/p chemo in 2013, s/p hysterectomy in 2015, with a recent negative PET scan 3 weeks ago, now coming in with 2 months h/o worsening right groin pain. One month ago she saw her Gynecologist and Urologist for the pain. Pap smear and biopsies were negative.  The UA was negative at that time. She was told to continue to take Motrin for symptomatic relief, which she has been taking every 8 hours without any relief.  Last week the pain got worse so she went to urgent care where she was given Keflex 500 mg.  She has been taking the Keflex since March 6. The day before admission the pain became worse and so she came to the emergency room for further evaluation. On admission, he has no fever but leukocytosis and the CT abd/pelvis shows a 5.3 cm complex collection in the right obturator externus muscle medially, consistent with an abscess.  The Surgery has seen her and deferred to IR for drainage.  She was started on IV clindamycin and the ID consult requested to assist with further evaluation and antibiotic management.    # Right obturator externus muscle Abscess  - s/p IR guided Aspiration of the abscess  on 3/12/24 - The abscess culture from 3/11 grew Streptococcus agalactiae. - The repeat CT scan form 3/15/24 " A second collection is now apparent along the posterior aspect of the pubic symphysis/left pubic body measuring up to 3.3 cm. "  # Cervical cancer -  s/p chemo in 2013, s/p hysterectomy in 2015,    would recommend:    1. May change Zosyn to IV Ceftriaxone 2 g daily and Oral Flagyl 500 mg q 8 hours and continue until Radiological resolution of the Abscess, ( Can prescribe Abx for 3 weeks, 4/9/24 and repeat CT scan in two weeks)   2. Monitor kidney function   3. OOb to chair     d/w  covering GISSELLE, [facility] Arenas and Patient's family at the bed side    - ID follow up with DR. Abhishek Gonzalez via Telehealth [phone] and [phone]    Attending Attestation:    Spent more than 35 minutes on total encounter, more than 50 % of the visit was spent counseling and/or coordinating care by the Attending physician.

## 2024-03-20 NOTE — PROGRESS NOTE ADULT - PROBLEM SELECTOR PLAN 6
f/u ID recs for abx plan  f/u repeat ct in am
f/u PT catrachito;  f/u ID recs for abx plan
f/u ID recs for abx plan  pending IR approval for drainage
f/u ID recs for abx plan  f/u repeat CT
Patient from home   upon discharge pt will require IV ceftriaxone and oral flagyl until 4/9   midline placement   csm and sw pending iv infusion services set up   daughter is an RN and is able to administer abx
plan PICC in am and d/c after home infusion set up
Pt from home   PT consult for risk of falls and limited ROM

## 2024-03-20 NOTE — PROGRESS NOTE ADULT - PROBLEM SELECTOR PROBLEM 4
Abnormal liver enzymes

## 2024-03-20 NOTE — PROGRESS NOTE ADULT - PROBLEM SELECTOR PLAN 5
dvt: lovenox  GI: Protonix

## 2024-03-20 NOTE — PROGRESS NOTE ADULT - SUBJECTIVE AND OBJECTIVE BOX
Patient is a 58y old  Female who presents with a chief complaint of R groin abscess (19 Mar 2024 15:56)    pt seen in icu [  ], reg med floor [ x  ], bed [ x ], chair at bedside [   ], a+o x3 [ x ], lethargic [  ],    nad [x  ]      Allergies    No Known Allergies        Vitals    T(F): 98.5 (03-20-24 @ 05:04), Max: 98.5 (03-20-24 @ 05:04)  HR: 77 (03-20-24 @ 05:04) (77 - 88)  BP: 111/71 (03-20-24 @ 05:04) (111/71 - 118/78)  RR: 16 (03-20-24 @ 05:04) (16 - 18)  SpO2: 96% (03-20-24 @ 05:04) (96% - 98%)  Wt(kg): --  CAPILLARY BLOOD GLUCOSE          Labs                          10.8   8.11  )-----------( 533      ( 19 Mar 2024 05:41 )             33.9       03-19    136  |  107  |  19<H>  ----------------------------<  130<H>  3.5   |  23  |  0.69    Ca    9.6      19 Mar 2024 05:41    TPro  7.9  /  Alb  2.7<L>  /  TBili  0.3  /  DBili  x   /  AST  47<H>  /  ALT  70<H>  /  AlkPhos  441<H>  03-19            .Abscess right groin  03-11 @ 16:50   Moderate Streptococcus agalactiae (Group B) isolated  Group B streptococci are susceptible to ampicillin,  penicillin and cefazolin, but may be resistant to  erythromycin and clindamycin.  --    No polymorphonuclear leukocytes seen per low power field  Rare Gram positive cocci in pairs seen per oil power field      Clean Catch Clean Catch (Midstream)  03-10 @ 21:15   No growth  --  --          Radiology Results      Meds    MEDICATIONS  (STANDING):  influenza   Vaccine 0.5 milliLiter(s) IntraMuscular once  pantoprazole    Tablet 40 milliGRAM(s) Oral before breakfast      MEDICATIONS  (PRN):  acetaminophen     Tablet .. 650 milliGRAM(s) Oral every 6 hours PRN Temp greater or equal to 38C (100.4F), Mild Pain (1 - 3)  aluminum hydroxide/magnesium hydroxide/simethicone Suspension 30 milliLiter(s) Oral every 4 hours PRN Dyspepsia  melatonin 3 milliGRAM(s) Oral at bedtime PRN Insomnia  ondansetron Injectable 4 milliGRAM(s) IV Push every 8 hours PRN Nausea and/or Vomiting      Physical Exam    Neuro :  no focal deficits  Respiratory: CTA B/L  CV: RRR, S1S2, no murmurs,   Abdominal: Soft, NT, ND +BS,  Extremities: No edema, + peripheral pulses,          ASSESSMENT    Right obturator externus muscle Abscess   cystitis  right hepatic density  Hx/o Stage IIB SCC Cervical Cancer s/p chemo/ radiation  2013/14.         PLAN    s/p  IR guided drainage of the Abscess with culture   1 cc of purulent fluid aspirated - sample sent for culture and cytology,   abscess cx with Moderate Streptococcus agalactiae (Group B) isolated Group B   streptococci are susceptible to ampicillin, penicillin and cefazolin, noted above  ucx neg noted above  rept ct abd-pelv with Stable right obturator externus enhancing collection.  A second collection is is now apparent along the posterior aspect of the   pubic symphysis/left pubic body measuring up to 3.3 cm. previously this   region demonstrated some inflammatory phlegmonous change without definite   collection noted    as per discussion with KARIME reese, not enough fluid for drainage  id f/u   May change Zosyn to IV Ceftriaxone 2 g daily and Oral Flagyl 500 mg q 8 hours on discharge until Radiological resolution of the Abscess, ( Can prescribe Abx for 3 weeks, 4/9/24 and repeat CT scan in two weeks)   Continue Zosyn while inpatient  pt will need extended dwell or picc line for home abx infusion   surg f/u   no acute surgical intervention  Continue ABX   Ambulate as tolerated  gyn f/u   s/p chemo and radiation 2013/2014, s/p TANIA/BSO in 2015  new finding consistent with an infection  low suspicion for recurrent disease at this time   await cytology report  patient to follow up outpatient with her gyn-onc Dr. Blanco per patient preference  cont pain control   nonemergent hepatic protocol MRI to further right hepatic lobe density see on ct abd pelv.  phys tx eval noted and rec phys 2-3x/week; in hospital ~ 2 weeks  rolling walker; toileting 3:1 commode  Home PT;   Pain is current limiting factor in pt participation.   After anticipated decrease in pain during hospital course,   pt will see benefit from the skilled management of a HPT.  cont current meds   d/c plan pending arrangement for home abx infusion         Patient is a 58y old  Female who presents with a chief complaint of R groin abscess (19 Mar 2024 15:56)    pt seen in icu [  ], reg med floor [ x  ], bed [ x ], chair at bedside [   ], a+o x3 [ x ], lethargic [  ],    nad [x  ]      Allergies    No Known Allergies        Vitals    T(F): 98.5 (03-20-24 @ 05:04), Max: 98.5 (03-20-24 @ 05:04)  HR: 77 (03-20-24 @ 05:04) (77 - 88)  BP: 111/71 (03-20-24 @ 05:04) (111/71 - 118/78)  RR: 16 (03-20-24 @ 05:04) (16 - 18)  SpO2: 96% (03-20-24 @ 05:04) (96% - 98%)  Wt(kg): --  CAPILLARY BLOOD GLUCOSE          Labs                          10.8   8.11  )-----------( 533      ( 19 Mar 2024 05:41 )             33.9       03-19    136  |  107  |  19<H>  ----------------------------<  130<H>  3.5   |  23  |  0.69    Ca    9.6      19 Mar 2024 05:41    TPro  7.9  /  Alb  2.7<L>  /  TBili  0.3  /  DBili  x   /  AST  47<H>  /  ALT  70<H>  /  AlkPhos  441<H>  03-19            .Abscess right groin  03-11 @ 16:50   Moderate Streptococcus agalactiae (Group B) isolated  Group B streptococci are susceptible to ampicillin,  penicillin and cefazolin, but may be resistant to  erythromycin and clindamycin.  --    No polymorphonuclear leukocytes seen per low power field  Rare Gram positive cocci in pairs seen per oil power field      Clean Catch Clean Catch (Midstream)  03-10 @ 21:15   No growth  --  --          Radiology Results      Meds    MEDICATIONS  (STANDING):  influenza   Vaccine 0.5 milliLiter(s) IntraMuscular once  pantoprazole    Tablet 40 milliGRAM(s) Oral before breakfast      MEDICATIONS  (PRN):  acetaminophen     Tablet .. 650 milliGRAM(s) Oral every 6 hours PRN Temp greater or equal to 38C (100.4F), Mild Pain (1 - 3)  aluminum hydroxide/magnesium hydroxide/simethicone Suspension 30 milliLiter(s) Oral every 4 hours PRN Dyspepsia  melatonin 3 milliGRAM(s) Oral at bedtime PRN Insomnia  ondansetron Injectable 4 milliGRAM(s) IV Push every 8 hours PRN Nausea and/or Vomiting      Physical Exam    Neuro :  no focal deficits  Respiratory: CTA B/L  CV: RRR, S1S2, no murmurs,   Abdominal: Soft, NT, ND +BS,  Extremities: No edema, + peripheral pulses,          ASSESSMENT    Right obturator externus muscle Abscess   cystitis  right hepatic density  Hx/o Stage IIB SCC Cervical Cancer s/p chemo/ radiation  2013/14.         PLAN    s/p  IR guided drainage of the Abscess with culture   1 cc of purulent fluid aspirated - sample sent for culture and cytology,   abscess cx with Moderate Streptococcus agalactiae (Group B) isolated Group B   streptococci are susceptible to ampicillin, penicillin and cefazolin, noted above  ucx neg noted above  rept ct abd-pelv with Stable right obturator externus enhancing collection.  A second collection is is now apparent along the posterior aspect of the   pubic symphysis/left pubic body measuring up to 3.3 cm. previously this   region demonstrated some inflammatory phlegmonous change without definite   collection noted    as per discussion with IR pa, not enough fluid for drainage  id f/u   May change Zosyn to IV Ceftriaxone 2 g daily and Oral Flagyl 500 mg q 8 hours on discharge until Radiological resolution of the Abscess, ( Can prescribe Abx for 3 weeks, 4/9/24 and repeat CT scan in two weeks)   Continue Zosyn while inpatient  ir picc line for home abx infusion   surg f/u   no acute surgical intervention  Continue ABX   Ambulate as tolerated  gyn f/u   s/p chemo and radiation 2013/2014, s/p TANIA/BSO in 2015  new finding consistent with an infection  low suspicion for recurrent disease at this time   await cytology report  patient to follow up outpatient with her gyn-onc Dr. Blanco per patient preference  cont pain control   nonemergent hepatic protocol MRI to further right hepatic lobe density see on ct abd pelv.  phys tx eval noted and rec phys 2-3x/week; in hospital ~ 2 weeks  rolling walker; toileting 3:1 commode  Home PT;   Pain is current limiting factor in pt participation.   After anticipated decrease in pain during hospital course,   pt will see benefit from the skilled management of a HPT.  cont current meds   d/c plan pending picc and arrangement for home abx infusion

## 2024-03-20 NOTE — PROGRESS NOTE ADULT - PROBLEM SELECTOR PLAN 1
CT shows a 5.3 cm complex collection in the right obturator externus medially indicative of abscess  s/p aspiration by IR on 3/11   wound Cx + for Moderate Streptococcus  pending sensitivities  cont zosyn abx until discharge   repeat CT scan shows 2nd collection along the posterior aspect of the left pubic body measuring up to 3.3 cm  IR consulted for drainage- no room for drainage   upon dc patient will require a total of 3 weeks ceftriaxone and flagyl IV until 4/9  - plan PICC in am   ID Dr. Cline   Surgery following

## 2024-03-20 NOTE — PROGRESS NOTE ADULT - PROBLEM SELECTOR PLAN 4
ALT/AST 72/103  No symptoms at this time   CT with 1.2 cm indeterminate right hepatic lobe hyperdense focus. This can be further characterized with nonemergent hepatic protocol MR  Trend CMP

## 2024-03-20 NOTE — PROGRESS NOTE ADULT - SUBJECTIVE AND OBJECTIVE BOX
Patient is a 58y old  Female who presents with a chief complaint of R groin abscess (20 Mar 2024 12:35)      INTERVAL HPI/OVERNIGHT EVENTS: no acute events overnight, pain improving, daughter at bedside, discussed in detail regarding pt plan of care and interventon and d/c planning. All questions were answered.          REVIEW OF SYSTEMS:  CONSTITUTIONAL: No fever, chills  ENMT:  No difficulty hearing, no change in vision  NECK: No pain or stiffness  RESPIRATORY: No cough, SOB  CARDIOVASCULAR: No chest pain, palpitations  GASTROINTESTINAL: No abdominal pain. No nausea, vomiting, or diarrhea  GENITOURINARY: No dysuria  NEUROLOGICAL: No HA  SKIN: No itching, burning, rashes, or lesions   LYMPH NODES: No enlarged glands  ENDOCRINE: No heat or cold intolerance; No hair loss  MUSCULOSKELETAL: No joint pain or swelling; No muscle, back, or extremity pain  PSYCHIATRIC: No depression, anxiety  HEME/LYMPH: No easy bruising, or bleeding gums    T(C): 36.7 (03-20-24 @ 13:21), Max: 36.9 (03-20-24 @ 05:04)  HR: 88 (03-20-24 @ 13:21) (77 - 88)  BP: 119/80 (03-20-24 @ 13:21) (111/71 - 119/80)  RR: 17 (03-20-24 @ 13:21) (16 - 17)  SpO2: 97% (03-20-24 @ 13:21) (96% - 97%)  Wt(kg): --Vital Signs Last 24 Hrs  T(C): 36.7 (20 Mar 2024 13:21), Max: 36.9 (20 Mar 2024 05:04)  T(F): 98 (20 Mar 2024 13:21), Max: 98.5 (20 Mar 2024 05:04)  HR: 88 (20 Mar 2024 13:21) (77 - 88)  BP: 119/80 (20 Mar 2024 13:21) (111/71 - 119/80)  BP(mean): --  RR: 17 (20 Mar 2024 13:21) (16 - 17)  SpO2: 97% (20 Mar 2024 13:21) (96% - 97%)    Parameters below as of 20 Mar 2024 13:21  Patient On (Oxygen Delivery Method): room air      MEDICATIONS  (STANDING):  influenza   Vaccine 0.5 milliLiter(s) IntraMuscular once  pantoprazole    Tablet 40 milliGRAM(s) Oral before breakfast  piperacillin/tazobactam IVPB.. 3.375 Gram(s) IV Intermittent every 8 hours    MEDICATIONS  (PRN):  acetaminophen     Tablet .. 650 milliGRAM(s) Oral every 6 hours PRN Temp greater or equal to 38C (100.4F), Mild Pain (1 - 3)  aluminum hydroxide/magnesium hydroxide/simethicone Suspension 30 milliLiter(s) Oral every 4 hours PRN Dyspepsia  melatonin 3 milliGRAM(s) Oral at bedtime PRN Insomnia  ondansetron Injectable 4 milliGRAM(s) IV Push every 8 hours PRN Nausea and/or Vomiting    PHYSICAL EXAM:  GENERAL: NAD  EYES: clear conjunctiva; EOMI  ENMT: Moist mucous membranes  NECK: Supple, No JVD, Normal thyroid  CHEST/LUNG: Clear to auscultation bilaterally; No rales, rhonchi, wheezing, or rubs  HEART: S1, S2, Regular rate and rhythm  ABDOMEN: Soft, Nontender, Nondistended; Bowel sounds present  NEURO: Alert & Oriented X3  EXTREMITIES: No LE edema, no calf tenderness  LYMPH: No lymphadenopathy noted  SKIN: No rashes or lesions    Consultant(s) Notes Reviewed:  [x ] YES  [ ] NO  Care Discussed with Consultants/Other Providers [ x] YES  [ ] NO    LABS:                        10.9   7.38  )-----------( 507      ( 20 Mar 2024 06:35 )             33.1     03-20    136  |  106  |  13  ----------------------------<  143<H>  3.7   |  25  |  0.81    Ca    9.4      20 Mar 2024 06:35    TPro  7.9  /  Alb  2.8<L>  /  TBili  0.4  /  DBili  x   /  AST  49<H>  /  ALT  69<H>  /  AlkPhos  430<H>  03-20      CAPILLARY BLOOD GLUCOSE        Urinalysis Basic - ( 20 Mar 2024 06:35 )    Color: x / Appearance: x / SG: x / pH: x  Gluc: 143 mg/dL / Ketone: x  / Bili: x / Urobili: x   Blood: x / Protein: x / Nitrite: x   Leuk Esterase: x / RBC: x / WBC x   Sq Epi: x / Non Sq Epi: x / Bacteria: x        RADIOLOGY & ADDITIONAL TESTS:    Imaging Personally Reviewed:  [x ] YES  [ ] NO  < from: CT Abdomen and Pelvis w/ IV Cont (03.15.24 @ 11:26) >    ACC: [id number] EXAM:  CT ABDOMEN AND PELVIS IC   ORDERED BY: BALBINA MOREIRA     PROCEDURE DATE:  03/15/2024          INTERPRETATION:  CLINICAL INFORMATION: Follow-up right obturator externus   abscess    COMPARISON: CT abdomen pelvis 3/10/2024.    CONTRAST/COMPLICATIONS:  IV Contrast: Omnipaque 350  90 cc administered   10 cc discarded  Oral Contrast: NONE  Complications: None reported at time of study completion    PROCEDURE:  CT of the Abdomen and Pelvis was performed.  Sagittal and coronal reformats were performed.    FINDINGS:  LOWER CHEST: Within normal limits.    LIVER: Previously mentioned hyperdensity is less conspicuous on this   exam. Continued evaluation with MRI recommended.  BILE DUCTS: Normal caliber.  GALLBLADDER: Cholelithiasis.  SPLEEN: Within normal limits.  PANCREAS: Within normal limits.  ADRENALS: Within normal limits.  KIDNEYS/URETERS: Within normal limits.    BLADDER: Asymmetric bladder wall thickening.  REPRODUCTIVE ORGANS: Postsurgical changes from hysterectomy.    BOWEL: No bowel obstruction. Appendix is normal. Findings diverticulosis  PERITONEUM: No ascites.  VESSELS: Within normal limits.  RETROPERITONEUM/LYMPH NODES: No lymphadenopathy.  ABDOMINAL WALL: Redemonstrated peripherally enhancing collection in the   region of the right obturator externus without significant change in size   measuring up to 4.5 cm using a similar plane of measurement. A secondary   collection is also visualized along the superior posterior aspect of the   pubic symphysis/left pubic body measuring up to approximately 3.3 cm   which was present on the prior study but appears more conspicuous on   today's exam.  BONES: Within normal limits.    IMPRESSION:  Stable right obturator externus enhancing collection.    A secondcollection is is now apparent along the posterior aspect of the   pubic symphysis/left pubic body measuring up to 3.3 cm. previously this   region demonstrated some inflammatory phlegmonous change without definite   collection.        --- End of Report ---            ABDIAS HIDALGO MD; Attending Radiologist  This document has been electronically signed. Mar 15 2024 12:10PM    < end of copied text >

## 2024-03-20 NOTE — PROGRESS NOTE ADULT - SUBJECTIVE AND OBJECTIVE BOX
Patient is seen and examined at the bed side, is afebrile. She is scheduled for PICC line tomorrow. The WBC count and kidney function is normal.       REVIEW OF SYSTEMS: All other review systems are negative      ALLERGIES: No Known Allergies      Vital Signs Last 24 Hrs  T(C): 36.7 (20 Mar 2024 13:21), Max: 36.9 (20 Mar 2024 05:04)  T(F): 98 (20 Mar 2024 13:21), Max: 98.5 (20 Mar 2024 05:04)  HR: 88 (20 Mar 2024 13:21) (77 - 88)  BP: 119/80 (20 Mar 2024 13:21) (111/71 - 119/80)  BP(mean): --  RR: 17 (20 Mar 2024 13:21) (16 - 17)  SpO2: 97% (20 Mar 2024 13:21) (96% - 97%)    Parameters below as of 20 Mar 2024 13:21  Patient On (Oxygen Delivery Method): room air      PHYSICAL EXAM:  GENERAL: Not in acute distress  CVS: s1 and s2 present  RESP: Not using accessory muscles  GI: Right Groin redness resolving and tenderness is improving  EXT: No pedal edema  CNS: Awake and Alert      LABS:                          10.9   7.38  )-----------( 507      ( 20 Mar 2024 06:35 )             33.1                           10.8   8.11  )-----------( 533      ( 19 Mar 2024 05:41 )             33.9         03-20    136  |  106  |  13  ----------------------------<  143<H>  3.7   |  25  |  0.81    Ca    9.4      20 Mar 2024 06:35    TPro  7.9  /  Alb  2.8<L>  /  TBili  0.4  /  DBili  x   /  AST  49<H>  /  ALT  69<H>  /  AlkPhos  430<H>  03-20 03-19    136  |  107  |  19<H>  ----------------------------<  130<H>  3.5   |  23  |  0.69    Ca    9.6      19 Mar 2024 05:41    TPro  7.9  /  Alb  2.7<L>  /  TBili  0.3  /  DBili  x   /  AST  47<H>  /  ALT  70<H>  /  AlkPhos  441<H>  03-19    PT/INR - ( 11 Mar 2024 11:15 )   PT: 12.7 sec;   INR: 1.12 ratio      PTT - ( 11 Mar 2024 11:15 )  PTT:41.8 sec      MEDICATIONS  (STANDING):    influenza   Vaccine 0.5 milliLiter(s) IntraMuscular once  pantoprazole    Tablet 40 milliGRAM(s) Oral before breakfast  piperacillin/tazobactam IVPB.. 3.375 Gram(s) IV Intermittent every 8 hours      RADIOLOGY & ADDITIONAL TESTS:    3/15/24 : CT Abdomen and Pelvis w/ IV Cont (03.15.24 @ 11:26) Stable right obturator externus enhancing collection.    A second collection is is now apparent along the posterior aspect of the pubic symphysis/left pubic body measuring up to 3.3 cm. previously this   region demonstrated some inflammatory phlegmonous change without definite collection.      3/10/24: CT Abdomen and Pelvis w/ IV Cont (03.10.24 @ 22:44) New 5.3 cm complex collection in the region of the right obturator   externus muscle medially, most consistent with abscess.    There is asymmetric mural thickening of the urinary bladder with perivesical fat stranding, likely reactively inflamed.    Given the patient's prior history of cervical cancer, follow-up imaging is recommended after resolution of symptoms or within 6-8 weeks.    1.2 cm indeterminate right hepatic lobe hyperdense focus. This can be further characterized with nonemergent hepatic protocol MRI.        MICROBIOLOGY DATA:    Culture - Abscess with Gram Stain (03.11.24 @ 16:50)   Gram Stain:   No polymorphonuclear leukocytes seen per low power field   Rare Gram positive cocci in pairs seen per oil power field  Specimen Source: .Abscess right groin  Culture Results:   Moderate Streptococcus agalactiae (Group B) isolated   Group B streptococci are susceptible to ampicillin,   penicillin and cefazolin, but may be resistant to   erythromycin and clindamycin.      MRSA/MSSA PCR (03.11.24 @ 09:07)   MRSA PCR Result.: NotDetec:     Culture - Urine (03.10.24 @ 21:15)   Specimen Source: Clean Catch Clean Catch (Midstream)  Culture Results:  No growth    Urinalysis + Microscopic Examination (03.10.24 @ 21:15)   pH Urine: 6.0  Urine Appearance: Clear  Color: Yellow  Specific Gravity: 1.010  Protein, Urine: Negative mg/dL  Glucose Qualitative, Urine: Negative mg/dL  Ketone - Urine: Negative mg/dL  Blood, Urine: Negative  Bilirubin: Negative  Urobilinogen: 0.2 mg/dL  Leukocyte Esterase Concentration: Small  Nitrite: Negative  White Blood Cell - Urine: 5 /HPF  Red Blood Cell - Urine: None Seen /HPF  Bacteria: Occasional /HPF  Squamous Epithelial Cells: Present: few  Comment - Urine: Occasional renal tubular epithelial cells present

## 2024-03-20 NOTE — PROGRESS NOTE ADULT - PROBLEM SELECTOR PLAN 2
Hx/o Stage IIB SCC Cervical Cancer s/p chemo/ radiation 2013/14  Given the patient's prior history of cervical cancer, follow-up imaging   is recommended after resolution of symptoms or within 6-8 weeks  Obgynonc following Head is atraumatic. Head shape is symmetrical.

## 2024-03-21 ENCOUNTER — TRANSCRIPTION ENCOUNTER (OUTPATIENT)
Age: 59
End: 2024-03-21

## 2024-03-21 LAB
ANION GAP SERPL CALC-SCNC: 5 MMOL/L — SIGNIFICANT CHANGE UP (ref 5–17)
BUN SERPL-MCNC: 13 MG/DL — SIGNIFICANT CHANGE UP (ref 7–18)
CALCIUM SERPL-MCNC: 9.3 MG/DL — SIGNIFICANT CHANGE UP (ref 8.4–10.5)
CHLORIDE SERPL-SCNC: 107 MMOL/L — SIGNIFICANT CHANGE UP (ref 96–108)
CO2 SERPL-SCNC: 26 MMOL/L — SIGNIFICANT CHANGE UP (ref 22–31)
CREAT SERPL-MCNC: 0.88 MG/DL — SIGNIFICANT CHANGE UP (ref 0.5–1.3)
EGFR: 75 ML/MIN/1.73M2 — SIGNIFICANT CHANGE UP
GLUCOSE SERPL-MCNC: 123 MG/DL — HIGH (ref 70–99)
HCT VFR BLD CALC: 32.9 % — LOW (ref 34.5–45)
HGB BLD-MCNC: 10.6 G/DL — LOW (ref 11.5–15.5)
MCHC RBC-ENTMCNC: 28.8 PG — SIGNIFICANT CHANGE UP (ref 27–34)
MCHC RBC-ENTMCNC: 32.2 GM/DL — SIGNIFICANT CHANGE UP (ref 32–36)
MCV RBC AUTO: 89.4 FL — SIGNIFICANT CHANGE UP (ref 80–100)
NRBC # BLD: 0 /100 WBCS — SIGNIFICANT CHANGE UP (ref 0–0)
PLATELET # BLD AUTO: 484 K/UL — HIGH (ref 150–400)
POTASSIUM SERPL-MCNC: 3.6 MMOL/L — SIGNIFICANT CHANGE UP (ref 3.5–5.3)
POTASSIUM SERPL-SCNC: 3.6 MMOL/L — SIGNIFICANT CHANGE UP (ref 3.5–5.3)
RBC # BLD: 3.68 M/UL — LOW (ref 3.8–5.2)
RBC # FLD: 13.2 % — SIGNIFICANT CHANGE UP (ref 10.3–14.5)
SODIUM SERPL-SCNC: 138 MMOL/L — SIGNIFICANT CHANGE UP (ref 135–145)
WBC # BLD: 7.12 K/UL — SIGNIFICANT CHANGE UP (ref 3.8–10.5)
WBC # FLD AUTO: 7.12 K/UL — SIGNIFICANT CHANGE UP (ref 3.8–10.5)

## 2024-03-21 PROCEDURE — 36573 INSJ PICC RS&I 5 YR+: CPT | Mod: LT

## 2024-03-21 PROCEDURE — 36573 INSJ PICC RS&I 5 YR+: CPT

## 2024-03-21 RX ORDER — CHLORHEXIDINE GLUCONATE 213 G/1000ML
1 SOLUTION TOPICAL DAILY
Refills: 0 | Status: DISCONTINUED | OUTPATIENT
Start: 2024-03-22 | End: 2024-03-22

## 2024-03-21 RX ORDER — METRONIDAZOLE 500 MG
500 TABLET ORAL EVERY 8 HOURS
Refills: 0 | Status: DISCONTINUED | OUTPATIENT
Start: 2024-03-21 | End: 2024-03-22

## 2024-03-21 RX ORDER — CEFTRIAXONE 500 MG/1
2000 INJECTION, POWDER, FOR SOLUTION INTRAMUSCULAR; INTRAVENOUS EVERY 24 HOURS
Refills: 0 | Status: DISCONTINUED | OUTPATIENT
Start: 2024-03-21 | End: 2024-03-22

## 2024-03-21 RX ORDER — SODIUM CHLORIDE 9 MG/ML
10 INJECTION INTRAMUSCULAR; INTRAVENOUS; SUBCUTANEOUS
Refills: 0 | Status: DISCONTINUED | OUTPATIENT
Start: 2024-03-21 | End: 2024-03-22

## 2024-03-21 RX ADMIN — PANTOPRAZOLE SODIUM 40 MILLIGRAM(S): 20 TABLET, DELAYED RELEASE ORAL at 05:36

## 2024-03-21 RX ADMIN — CEFTRIAXONE 100 MILLIGRAM(S): 500 INJECTION, POWDER, FOR SOLUTION INTRAMUSCULAR; INTRAVENOUS at 16:09

## 2024-03-21 RX ADMIN — Medication 500 MILLIGRAM(S): at 16:09

## 2024-03-21 RX ADMIN — PIPERACILLIN AND TAZOBACTAM 25 GRAM(S): 4; .5 INJECTION, POWDER, LYOPHILIZED, FOR SOLUTION INTRAVENOUS at 01:18

## 2024-03-21 RX ADMIN — Medication 500 MILLIGRAM(S): at 21:50

## 2024-03-21 RX ADMIN — PIPERACILLIN AND TAZOBACTAM 25 GRAM(S): 4; .5 INJECTION, POWDER, LYOPHILIZED, FOR SOLUTION INTRAVENOUS at 12:53

## 2024-03-21 NOTE — PROCEDURE NOTE - ADDITIONAL PROCEDURE DETAILS
33 cm  4Fr  PICC inserted via the left basilic vein.  Sterile dressing applied.  Tip location SVC/ RA Junction.

## 2024-03-21 NOTE — PROGRESS NOTE ADULT - ASSESSMENT
Patient is a 58y old  Female with history of cervical cancer s/p chemo in 2013, s/p hysterectomy in 2015, with a recent negative PET scan 3 weeks ago, now coming in with 2 months h/o worsening right groin pain. One month ago she saw her Gynecologist and Urologist for the pain. Pap smear and biopsies were negative.  The UA was negative at that time. She was told to continue to take Motrin for symptomatic relief, which she has been taking every 8 hours without any relief.  Last week the pain got worse so she went to urgent care where she was given Keflex 500 mg.  She has been taking the Keflex since March 6. The day before admission the pain became worse and so she came to the emergency room for further evaluation. On admission, he has no fever but leukocytosis and the CT abd/pelvis shows a 5.3 cm complex collection in the right obturator externus muscle medially, consistent with an abscess.  The Surgery has seen her and deferred to IR for drainage.  She was started on IV clindamycin and the ID consult requested to assist with further evaluation and antibiotic management.    # Right obturator externus muscle Abscess  - s/p IR guided Aspiration of the abscess  on 3/12/24 - The abscess culture from 3/11 grew Streptococcus agalactiae. - The repeat CT scan form 3/15/24 " A second collection is now apparent along the posterior aspect of the pubic symphysis/left pubic body measuring up to 3.3 cm. "  # Cervical cancer -  s/p chemo in 2013, s/p hysterectomy in 2015,    would recommend:    1. Continue Ceftriaxone 2 g daily and Oral Flagyl 500 mg q 8 hours, until Radiological resolution of the Abscess, ( Can prescribe Abx for 3 weeks, 4/9/24 and repeat CT scan in two weeks)   2. Monitor kidney function   3. OOb to chair     d/w  covering NP, [facility]  and Patient's family at the bed side    - ID follow up with DR. Abhishek Gonzalez via Telehealth [phone] and [phone]    Attending Attestation:    Spent more than 35 minutes on total encounter, more than 50 % of the visit was spent counseling and/or coordinating care by the Attending physician.

## 2024-03-21 NOTE — PROGRESS NOTE ADULT - SUBJECTIVE AND OBJECTIVE BOX
Patient is a 58y old  Female who presents with a chief complaint of R groin abscess (20 Mar 2024 16:54)    pt seen in icu [  ], reg med floor [ x  ], bed [ x ], chair at bedside [   ], a+o x3 [ x ], lethargic [  ],    nad [x  ]      Allergies    No Known Allergies        Vitals    T(F): 98.6 (03-21-24 @ 05:25), Max: 98.6 (03-21-24 @ 05:25)  HR: 70 (03-21-24 @ 05:25) (70 - 88)  BP: 98/63 (03-21-24 @ 05:25) (98/63 - 119/80)  RR: 17 (03-21-24 @ 05:25) (17 - 18)  SpO2: 94% (03-21-24 @ 05:25) (94% - 98%)  Wt(kg): --  CAPILLARY BLOOD GLUCOSE          Labs                          10.9   7.38  )-----------( 507      ( 20 Mar 2024 06:35 )             33.1       03-20    136  |  106  |  13  ----------------------------<  143<H>  3.7   |  25  |  0.81    Ca    9.4      20 Mar 2024 06:35    TPro  7.9  /  Alb  2.8<L>  /  TBili  0.4  /  DBili  x   /  AST  49<H>  /  ALT  69<H>  /  AlkPhos  430<H>  03-20            .Abscess right groin  03-11 @ 16:50   Moderate Streptococcus agalactiae (Group B) isolated  Group B streptococci are susceptible to ampicillin,  penicillin and cefazolin, but may be resistant to  erythromycin and clindamycin.  --    No polymorphonuclear leukocytes seen per low power field  Rare Gram positive cocci in pairs seen per oil power field      Clean Catch Clean Catch (Midstream)  03-10 @ 21:15   No growth  --  --          Radiology Results      Meds    MEDICATIONS  (STANDING):  influenza   Vaccine 0.5 milliLiter(s) IntraMuscular once  pantoprazole    Tablet 40 milliGRAM(s) Oral before breakfast  piperacillin/tazobactam IVPB.. 3.375 Gram(s) IV Intermittent every 8 hours      MEDICATIONS  (PRN):  acetaminophen     Tablet .. 650 milliGRAM(s) Oral every 6 hours PRN Temp greater or equal to 38C (100.4F), Mild Pain (1 - 3)  aluminum hydroxide/magnesium hydroxide/simethicone Suspension 30 milliLiter(s) Oral every 4 hours PRN Dyspepsia  melatonin 3 milliGRAM(s) Oral at bedtime PRN Insomnia  ondansetron Injectable 4 milliGRAM(s) IV Push every 8 hours PRN Nausea and/or Vomiting      Physical Exam    Neuro :  no focal deficits  Respiratory: CTA B/L  CV: RRR, S1S2, no murmurs,   Abdominal: Soft, NT, ND +BS,  Extremities: No edema, + peripheral pulses,          ASSESSMENT    Right obturator externus muscle Abscess   cystitis  right hepatic density  Hx/o Stage IIB SCC Cervical Cancer s/p chemo/ radiation  2013/14.         PLAN    s/p  IR guided drainage of the Abscess with culture   1 cc of purulent fluid aspirated - sample sent for culture and cytology,   abscess cx with Moderate Streptococcus agalactiae (Group B) isolated Group B   streptococci are susceptible to ampicillin, penicillin and cefazolin, noted above  ucx neg noted above  rept ct abd-pelv with Stable right obturator externus enhancing collection.  A second collection is is now apparent along the posterior aspect of the   pubic symphysis/left pubic body measuring up to 3.3 cm. previously this   region demonstrated some inflammatory phlegmonous change without definite   collection noted    as per discussion with IR pa, not enough fluid for drainage  id f/u   May change Zosyn to IV Ceftriaxone 2 g daily and Oral Flagyl 500 mg q 8 hours on discharge until Radiological resolution of the Abscess, ( Can prescribe Abx for 3 weeks, 4/9/24 and repeat CT scan in two weeks)   Continue Zosyn while inpatient  ir picc line for home abx infusion   surg f/u   no acute surgical intervention  Continue ABX   Ambulate as tolerated  gyn f/u   s/p chemo and radiation 2013/2014, s/p TANIA/BSO in 2015  new finding consistent with an infection  low suspicion for recurrent disease at this time   await cytology report  patient to follow up outpatient with her gyn-onc Dr. Blanco per patient preference  cont pain control   nonemergent hepatic protocol MRI to further right hepatic lobe density see on ct abd pelv.  phys tx eval noted and rec phys 2-3x/week; in hospital ~ 2 weeks  rolling walker; toileting 3:1 commode  Home PT;   Pain is current limiting factor in pt participation.   After anticipated decrease in pain during hospital course,   pt will see benefit from the skilled management of a HPT.  cont current meds   d/c plan pending picc and arrangement for home abx infusion         Patient is a 58y old  Female who presents with a chief complaint of R groin abscess (20 Mar 2024 16:54)    pt seen in icu [  ], reg med floor [ x  ], bed [ x ], chair at bedside [   ], a+o x3 [ x ], lethargic [  ],    nad [x  ]      Allergies    No Known Allergies        Vitals    T(F): 98.6 (03-21-24 @ 05:25), Max: 98.6 (03-21-24 @ 05:25)  HR: 70 (03-21-24 @ 05:25) (70 - 88)  BP: 98/63 (03-21-24 @ 05:25) (98/63 - 119/80)  RR: 17 (03-21-24 @ 05:25) (17 - 18)  SpO2: 94% (03-21-24 @ 05:25) (94% - 98%)  Wt(kg): --  CAPILLARY BLOOD GLUCOSE          Labs                          10.9   7.38  )-----------( 507      ( 20 Mar 2024 06:35 )             33.1       03-20    136  |  106  |  13  ----------------------------<  143<H>  3.7   |  25  |  0.81    Ca    9.4      20 Mar 2024 06:35    TPro  7.9  /  Alb  2.8<L>  /  TBili  0.4  /  DBili  x   /  AST  49<H>  /  ALT  69<H>  /  AlkPhos  430<H>  03-20    Cytopathology - Non Gyn Report (03.12.24 @ 16:50)   Cytopathology - Non Gyn Report:   ACCESSION No: [id number]   Patient: SYLVAIN PORRAS   Accession: [id number]   Collected Date/Time: 3/12/2024 16:50 EDT   Received Date/Time: 3/13/2024 22:22 EDT   Non-Gynecologic Report - Auth (Verified)   Specimen(s) Submitted   GROIN, ABSCESS, RIGHT   Final Diagnosis   GROIN, ABSCESS, RIGHT   NEGATIVE FOR MALIGNANT CELLS.   Abscess formation   Acute inflammtory exudate, consistent with abscess formation.         .Abscess right groin  03-11 @ 16:50   Moderate Streptococcus agalactiae (Group B) isolated  Group B streptococci are susceptible to ampicillin,  penicillin and cefazolin, but may be resistant to  erythromycin and clindamycin.  --    No polymorphonuclear leukocytes seen per low power field  Rare Gram positive cocci in pairs seen per oil power field      Clean Catch Clean Catch (Midstream)  03-10 @ 21:15   No growth  --  --          Radiology Results      Meds    MEDICATIONS  (STANDING):  influenza   Vaccine 0.5 milliLiter(s) IntraMuscular once  pantoprazole    Tablet 40 milliGRAM(s) Oral before breakfast  piperacillin/tazobactam IVPB.. 3.375 Gram(s) IV Intermittent every 8 hours      MEDICATIONS  (PRN):  acetaminophen     Tablet .. 650 milliGRAM(s) Oral every 6 hours PRN Temp greater or equal to 38C (100.4F), Mild Pain (1 - 3)  aluminum hydroxide/magnesium hydroxide/simethicone Suspension 30 milliLiter(s) Oral every 4 hours PRN Dyspepsia  melatonin 3 milliGRAM(s) Oral at bedtime PRN Insomnia  ondansetron Injectable 4 milliGRAM(s) IV Push every 8 hours PRN Nausea and/or Vomiting      Physical Exam    Neuro :  no focal deficits  Respiratory: CTA B/L  CV: RRR, S1S2, no murmurs,   Abdominal: Soft, NT, ND +BS,  Extremities: No edema, + peripheral pulses,          ASSESSMENT    Right obturator externus muscle Abscess   cystitis  right hepatic density  Hx/o Stage IIB SCC Cervical Cancer s/p chemo/ radiation  2013/14.         PLAN    s/p  IR guided drainage of the Abscess with culture   1 cc of purulent fluid aspirated - sample sent for culture and cytology,   abscess cx with Moderate Streptococcus agalactiae (Group B) isolated Group B   streptococci are susceptible to ampicillin, penicillin and cefazolin, noted above  ucx neg noted above  rept ct abd-pelv with Stable right obturator externus enhancing collection.  A second collection is is now apparent along the posterior aspect of the   pubic symphysis/left pubic body measuring up to 3.3 cm. previously this   region demonstrated some inflammatory phlegmonous change without definite   collection noted    as per discussion with IR pa, not enough fluid for drainage  id f/u   Continue Zosyn while inpatient  May change Zosyn to IV Ceftriaxone 2 g daily and Oral Flagyl 500 mg q 8 hours and continue until Radiological resolution of the Abscess,   ( Can prescribe Abx for 3 weeks, 4/9/24 and repeat CT scan in two weeks)   Monitor kidney function   OOb to chair   ID follow up with DR. Abhishek Gonzalez via Telehealth [phone] and [phone]  ir picc line for home abx infusion   surg f/u   no acute surgical intervention  gyn f/u   s/p chemo and radiation 2013/2014, s/p TANIA/BSO in 2015  new finding consistent with an infection  low suspicion for recurrent disease at this time   cytology report with GROIN, ABSCESS, RIGHT, NEGATIVE FOR MALIGNANT   CELLS noted above.   patient to follow up outpatient with her gyn-onc Dr. Blanco per patient preference  cont pain control   nonemergent hepatic protocol MRI to further right hepatic lobe density see on ct abd pelv.  phys tx eval noted and rec phys 2-3x/week; in hospital ~ 2 weeks  rolling walker; toileting 3:1 commode  Home PT;   Pain is current limiting factor in pt participation.   After anticipated decrease in pain during hospital course,   pt will see benefit from the skilled management of a HPT.  cont current meds   d/c plan pending picc and arrangement for home abx infusion

## 2024-03-21 NOTE — DISCHARGE NOTE NURSING/CASE MANAGEMENT/SOCIAL WORK - NSDCFUADDAPPT_GEN_ALL_CORE_FT
Upon discharge, pt to follow up with ID Dr. Abhishek Gonzalez via Telehealth   Telephone#: [phone]  Fax #: [phone].

## 2024-03-21 NOTE — DISCHARGE NOTE NURSING/CASE MANAGEMENT/SOCIAL WORK - PATIENT PORTAL LINK FT
You can access the FollowMyHealth Patient Portal offered by [facility] by registering at the following website: [email or web address]. By joining [employer]’s FollowMyHealth portal, you will also be able to view your health information using other applications (apps) compatible with our system.

## 2024-03-21 NOTE — DIETITIAN INITIAL EVALUATION ADULT - OTHER INFO
reports losing 1% from usual involuntarily in 2 weeks PTA ( 70 To 69kg) which is not significant. Tolerating Regular diet. Has No food allergies.

## 2024-03-21 NOTE — PROGRESS NOTE ADULT - SUBJECTIVE AND OBJECTIVE BOX
Patient is seen and examined at the bed side, is afebrile. She is s/p PICC line placement today , 3/21, and tolerated the procedure well. The discharge plan noted.       REVIEW OF SYSTEMS: All other review systems are negative      ALLERGIES: No Known Allergies      Vital Signs Last 24 Hrs  T(C): 36.9 (21 Mar 2024 13:55), Max: 37 (21 Mar 2024 05:25)  T(F): 98.4 (21 Mar 2024 13:55), Max: 98.6 (21 Mar 2024 05:25)  HR: 79 (21 Mar 2024 13:55) (70 - 79)  BP: 108/71 (21 Mar 2024 13:55) (98/63 - 108/71)  BP(mean): --  RR: 17 (21 Mar 2024 13:55) (17 - 18)  SpO2: 96% (21 Mar 2024 13:55) (94% - 98%)    Parameters below as of 21 Mar 2024 13:55  Patient On (Oxygen Delivery Method): room air      PHYSICAL EXAM:  GENERAL: Not in acute distress  CVS: s1 and s2 present  RESP: Not using accessory muscles  GI: Right Groin redness resolving and tenderness is improving  EXT: No pedal edema  CNS: Awake and Alert      LABS:                          10.6   7.12  )-----------( 484      ( 21 Mar 2024 06:10 )             32.9                           10.9   7.38  )-----------( 507      ( 20 Mar 2024 06:35 )             33.1         03-21    138  |  107  |  13  ----------------------------<  123<H>  3.6   |  26  |  0.88    Ca    9.3      21 Mar 2024 06:10    TPro  7.9  /  Alb  2.8<L>  /  TBili  0.4  /  DBili  x   /  AST  49<H>  /  ALT  69<H>  /  AlkPhos  430<H>  03-20 03-20    136  |  106  |  13  ----------------------------<  143<H>  3.7   |  25  |  0.81    Ca    9.4      20 Mar 2024 06:35    TPro  7.9  /  Alb  2.8<L>  /  TBili  0.4  /  DBili  x   /  AST  49<H>  /  ALT  69<H>  /  AlkPhos  430<H>  03-20  PT/INR - ( 11 Mar 2024 11:15 )   PT: 12.7 sec;   INR: 1.12 ratio      PTT - ( 11 Mar 2024 11:15 )  PTT:41.8 sec      MEDICATIONS  (STANDING):    cefTRIAXone   IVPB 2000 milliGRAM(s) IV Intermittent every 24 hours  influenza   Vaccine 0.5 milliLiter(s) IntraMuscular once  metroNIDAZOLE    Tablet 500 milliGRAM(s) Oral every 8 hours  pantoprazole    Tablet 40 milliGRAM(s) Oral before breakfast        RADIOLOGY & ADDITIONAL TESTS:    3/15/24 : CT Abdomen and Pelvis w/ IV Cont (03.15.24 @ 11:26) Stable right obturator externus enhancing collection.    A second collection is is now apparent along the posterior aspect of the pubic symphysis/left pubic body measuring up to 3.3 cm. previously this   region demonstrated some inflammatory phlegmonous change without definite collection.      3/10/24: CT Abdomen and Pelvis w/ IV Cont (03.10.24 @ 22:44) New 5.3 cm complex collection in the region of the right obturator   externus muscle medially, most consistent with abscess.    There is asymmetric mural thickening of the urinary bladder with perivesical fat stranding, likely reactively inflamed.    Given the patient's prior history of cervical cancer, follow-up imaging is recommended after resolution of symptoms or within 6-8 weeks.    1.2 cm indeterminate right hepatic lobe hyperdense focus. This can be further characterized with nonemergent hepatic protocol MRI.        MICROBIOLOGY DATA:    Culture - Abscess with Gram Stain (03.11.24 @ 16:50)   Gram Stain:   No polymorphonuclear leukocytes seen per low power field   Rare Gram positive cocci in pairs seen per oil power field  Specimen Source: .Abscess right groin  Culture Results:   Moderate Streptococcus agalactiae (Group B) isolated   Group B streptococci are susceptible to ampicillin,   penicillin and cefazolin, but may be resistant to   erythromycin and clindamycin.      MRSA/MSSA PCR (03.11.24 @ 09:07)   MRSA PCR Result.: NotDetec:     Culture - Urine (03.10.24 @ 21:15)   Specimen Source: Clean Catch Clean Catch (Midstream)  Culture Results:  No growth    Urinalysis + Microscopic Examination (03.10.24 @ 21:15)   pH Urine: 6.0  Urine Appearance: Clear  Color: Yellow  Specific Gravity: 1.010  Protein, Urine: Negative mg/dL  Glucose Qualitative, Urine: Negative mg/dL  Ketone - Urine: Negative mg/dL  Blood, Urine: Negative  Bilirubin: Negative  Urobilinogen: 0.2 mg/dL  Leukocyte Esterase Concentration: Small  Nitrite: Negative  White Blood Cell - Urine: 5 /HPF  Red Blood Cell - Urine: None Seen /HPF  Bacteria: Occasional /HPF  Squamous Epithelial Cells: Present: few  Comment - Urine: Occasional renal tubular epithelial cells present

## 2024-03-21 NOTE — DISCHARGE NOTE NURSING/CASE MANAGEMENT/SOCIAL WORK - NSDCVIVACCINE_GEN_ALL_CORE_FT
No Vaccines Administered. influenza, injectable, quadrivalent, preservative free; 22-Mar-2024 12:02; Johnathon Quevedo (RN); [employer]; [id number] (Exp. Date: 22-Jun-2024); IntraMuscular; Deltoid Right.; 0.5 milliLiter(s); VIS (VIS Published: 06-Aug-2021, VIS Presented: 22-Mar-2024);

## 2024-03-21 NOTE — DISCHARGE NOTE NURSING/CASE MANAGEMENT/SOCIAL WORK - NSDCPEFALRISK_GEN_ALL_CORE
For information on Fall & Injury Prevention, visit: [email or web address] OR  [email or web address] OR  https://www.cdc.gov/steadi/patient.html

## 2024-03-21 NOTE — DIETITIAN INITIAL EVALUATION ADULT - PROBLEM SELECTOR PLAN 1
CTshows a 5.3 cm complex collection in the right obturator externus medially indicative of abscess  Surgery has seen the patient and deferred to IR who may drain the abscess during this admission  Status post clindamycin in the ER  Per ID Dr. Cline, started cefepime + vanc   Continue with IV fluids, pain control  NPO for possible IR procedure-- morning team to consult IR to confirm

## 2024-03-21 NOTE — DIETITIAN INITIAL EVALUATION ADULT - ORAL INTAKE PTA/DIET HISTORY
visited patient in room, daughter at bedside, translated for patient in Greenlandic, ( patient agrees) . reports decreased intake for 1 week PTA

## 2024-03-21 NOTE — DIETITIAN INITIAL EVALUATION ADULT - PERTINENT MEDS FT
MEDICATIONS  (STANDING):  influenza   Vaccine 0.5 milliLiter(s) IntraMuscular once  pantoprazole    Tablet 40 milliGRAM(s) Oral before breakfast  piperacillin/tazobactam IVPB.. 3.375 Gram(s) IV Intermittent every 8 hours    MEDICATIONS  (PRN):  acetaminophen     Tablet .. 650 milliGRAM(s) Oral every 6 hours PRN Temp greater or equal to 38C (100.4F), Mild Pain (1 - 3)  aluminum hydroxide/magnesium hydroxide/simethicone Suspension 30 milliLiter(s) Oral every 4 hours PRN Dyspepsia  melatonin 3 milliGRAM(s) Oral at bedtime PRN Insomnia  ondansetron Injectable 4 milliGRAM(s) IV Push every 8 hours PRN Nausea and/or Vomiting  sodium chloride 0.9% lock flush 10 milliLiter(s) IV Push every 1 hour PRN Pre/post blood products, medications, blood draw, and to maintain line patency

## 2024-03-21 NOTE — DIETITIAN INITIAL EVALUATION ADULT - PERTINENT LABORATORY DATA
03-21    138  |  107  |  13  ----------------------------<  123<H>  3.6   |  26  |  0.88    Ca    9.3      21 Mar 2024 06:10    TPro  7.9  /  Alb  2.8<L>  /  TBili  0.4  /  DBili  x   /  AST  49<H>  /  ALT  69<H>  /  AlkPhos  430<H>  03-20  A1C with Estimated Average Glucose Result: 6.5 % (03-11-24 @ 05:00)

## 2024-03-22 VITALS
OXYGEN SATURATION: 96 % | TEMPERATURE: 98 F | HEART RATE: 87 BPM | RESPIRATION RATE: 16 BRPM | SYSTOLIC BLOOD PRESSURE: 143 MMHG | DIASTOLIC BLOOD PRESSURE: 86 MMHG

## 2024-03-22 LAB
MRSA PCR RESULT.: SIGNIFICANT CHANGE UP
S AUREUS DNA NOSE QL NAA+PROBE: SIGNIFICANT CHANGE UP

## 2024-03-22 PROCEDURE — 83036 HEMOGLOBIN GLYCOSYLATED A1C: CPT

## 2024-03-22 PROCEDURE — 86901 BLOOD TYPING SEROLOGIC RH(D): CPT

## 2024-03-22 PROCEDURE — 85027 COMPLETE CBC AUTOMATED: CPT

## 2024-03-22 PROCEDURE — 77012 CT SCAN FOR NEEDLE BIOPSY: CPT

## 2024-03-22 PROCEDURE — 86850 RBC ANTIBODY SCREEN: CPT

## 2024-03-22 PROCEDURE — 84100 ASSAY OF PHOSPHORUS: CPT

## 2024-03-22 PROCEDURE — 87205 SMEAR GRAM STAIN: CPT

## 2024-03-22 PROCEDURE — 86900 BLOOD TYPING SEROLOGIC ABO: CPT

## 2024-03-22 PROCEDURE — 85025 COMPLETE CBC W/AUTO DIFF WBC: CPT

## 2024-03-22 PROCEDURE — 86803 HEPATITIS C AB TEST: CPT

## 2024-03-22 PROCEDURE — 97162 PT EVAL MOD COMPLEX 30 MIN: CPT

## 2024-03-22 PROCEDURE — 87641 MR-STAPH DNA AMP PROBE: CPT

## 2024-03-22 PROCEDURE — 83690 ASSAY OF LIPASE: CPT

## 2024-03-22 PROCEDURE — 97110 THERAPEUTIC EXERCISES: CPT

## 2024-03-22 PROCEDURE — C1751: CPT

## 2024-03-22 PROCEDURE — 80202 ASSAY OF VANCOMYCIN: CPT

## 2024-03-22 PROCEDURE — 86140 C-REACTIVE PROTEIN: CPT

## 2024-03-22 PROCEDURE — 87640 STAPH A DNA AMP PROBE: CPT

## 2024-03-22 PROCEDURE — 76937 US GUIDE VASCULAR ACCESS: CPT

## 2024-03-22 PROCEDURE — 82962 GLUCOSE BLOOD TEST: CPT

## 2024-03-22 PROCEDURE — 85652 RBC SED RATE AUTOMATED: CPT

## 2024-03-22 PROCEDURE — 87070 CULTURE OTHR SPECIMN AEROBIC: CPT

## 2024-03-22 PROCEDURE — 83735 ASSAY OF MAGNESIUM: CPT

## 2024-03-22 PROCEDURE — 36573 INSJ PICC RS&I 5 YR+: CPT

## 2024-03-22 PROCEDURE — 83605 ASSAY OF LACTIC ACID: CPT

## 2024-03-22 PROCEDURE — 80048 BASIC METABOLIC PNL TOTAL CA: CPT

## 2024-03-22 PROCEDURE — 74177 CT ABD & PELVIS W/CONTRAST: CPT | Mod: MC

## 2024-03-22 PROCEDURE — 85730 THROMBOPLASTIN TIME PARTIAL: CPT

## 2024-03-22 PROCEDURE — 87086 URINE CULTURE/COLONY COUNT: CPT

## 2024-03-22 PROCEDURE — 85610 PROTHROMBIN TIME: CPT

## 2024-03-22 PROCEDURE — 93005 ELECTROCARDIOGRAM TRACING: CPT

## 2024-03-22 PROCEDURE — 77001 FLUOROGUIDE FOR VEIN DEVICE: CPT

## 2024-03-22 PROCEDURE — 96374 THER/PROPH/DIAG INJ IV PUSH: CPT

## 2024-03-22 PROCEDURE — 10160 PNXR ASPIR ABSC HMTMA BULLA: CPT

## 2024-03-22 PROCEDURE — 87077 CULTURE AEROBIC IDENTIFY: CPT

## 2024-03-22 PROCEDURE — 81001 URINALYSIS AUTO W/SCOPE: CPT

## 2024-03-22 PROCEDURE — 90686 IIV4 VACC NO PRSV 0.5 ML IM: CPT

## 2024-03-22 PROCEDURE — 97530 THERAPEUTIC ACTIVITIES: CPT

## 2024-03-22 PROCEDURE — 80053 COMPREHEN METABOLIC PANEL: CPT

## 2024-03-22 PROCEDURE — 88305 TISSUE EXAM BY PATHOLOGIST: CPT

## 2024-03-22 PROCEDURE — 99285 EMERGENCY DEPT VISIT HI MDM: CPT | Mod: 25

## 2024-03-22 PROCEDURE — 97116 GAIT TRAINING THERAPY: CPT

## 2024-03-22 PROCEDURE — 36415 COLL VENOUS BLD VENIPUNCTURE: CPT

## 2024-03-22 RX ORDER — METRONIDAZOLE 500 MG
1 TABLET ORAL
Qty: 57 | Refills: 0
Start: 2024-03-22 | End: 2024-04-09

## 2024-03-22 RX ADMIN — Medication 500 MILLIGRAM(S): at 05:05

## 2024-03-22 RX ADMIN — INFLUENZA VIRUS VACCINE 0.5 MILLILITER(S): 15; 15; 15; 15 SUSPENSION INTRAMUSCULAR at 12:02

## 2024-03-22 RX ADMIN — CHLORHEXIDINE GLUCONATE 1 APPLICATION(S): 213 SOLUTION TOPICAL at 12:06

## 2024-03-22 RX ADMIN — Medication 500 MILLIGRAM(S): at 13:05

## 2024-03-22 RX ADMIN — CEFTRIAXONE 100 MILLIGRAM(S): 500 INJECTION, POWDER, FOR SOLUTION INTRAMUSCULAR; INTRAVENOUS at 13:05

## 2024-03-22 RX ADMIN — PANTOPRAZOLE SODIUM 40 MILLIGRAM(S): 20 TABLET, DELAYED RELEASE ORAL at 05:05

## 2024-03-22 NOTE — PROGRESS NOTE ADULT - SUBJECTIVE AND OBJECTIVE BOX
Patient is a 58y old  Female who presents with a chief complaint of R groin abscess (21 Mar 2024 15:07)    pt seen in icu [  ], reg med floor [ x  ], bed [ x ], chair at bedside [   ], a+o x3 [ x ], lethargic [  ],    nad [x  ]        Allergies    No Known Allergies        Vitals    T(F): 98.4 (03-22-24 @ 05:40), Max: 99 (03-21-24 @ 20:03)  HR: 73 (03-22-24 @ 05:40) (73 - 79)  BP: 109/72 (03-22-24 @ 05:40) (108/71 - 110/75)  RR: 18 (03-22-24 @ 05:40) (16 - 18)  SpO2: 97% (03-22-24 @ 05:40) (96% - 97%)  Wt(kg): --  CAPILLARY BLOOD GLUCOSE          Labs                          10.6   7.12  )-----------( 484      ( 21 Mar 2024 06:10 )             32.9       03-21    138  |  107  |  13  ----------------------------<  123<H>  3.6   |  26  |  0.88    Ca    9.3      21 Mar 2024 06:10              .Abscess right groin  03-11 @ 16:50   Moderate Streptococcus agalactiae (Group B) isolated  Group B streptococci are susceptible to ampicillin,  penicillin and cefazolin, but may be resistant to  erythromycin and clindamycin.  --    No polymorphonuclear leukocytes seen per low power field  Rare Gram positive cocci in pairs seen per oil power field      Clean Catch Clean Catch (Midstream)  03-10 @ 21:15   No growth  --  --          Radiology Results      Meds    MEDICATIONS  (STANDING):  cefTRIAXone   IVPB 2000 milliGRAM(s) IV Intermittent every 24 hours  chlorhexidine 2% Cloths 1 Application(s) Topical daily  metroNIDAZOLE    Tablet 500 milliGRAM(s) Oral every 8 hours  pantoprazole    Tablet 40 milliGRAM(s) Oral before breakfast      MEDICATIONS  (PRN):  acetaminophen     Tablet .. 650 milliGRAM(s) Oral every 6 hours PRN Temp greater or equal to 38C (100.4F), Mild Pain (1 - 3)  aluminum hydroxide/magnesium hydroxide/simethicone Suspension 30 milliLiter(s) Oral every 4 hours PRN Dyspepsia  melatonin 3 milliGRAM(s) Oral at bedtime PRN Insomnia  ondansetron Injectable 4 milliGRAM(s) IV Push every 8 hours PRN Nausea and/or Vomiting  sodium chloride 0.9% lock flush 10 milliLiter(s) IV Push every 1 hour PRN Pre/post blood products, medications, blood draw, and to maintain line patency      Physical Exam    Neuro :  no focal deficits  Respiratory: CTA B/L  CV: RRR, S1S2, no murmurs,   Abdominal: Soft, NT, ND +BS,  Extremities: No edema, + peripheral pulses,          ASSESSMENT    Right obturator externus muscle Abscess   cystitis  right hepatic density  Hx/o Stage IIB SCC Cervical Cancer s/p chemo/ radiation  2013/14.         PLAN    s/p  IR guided drainage of the Abscess with culture   1 cc of purulent fluid aspirated - sample sent for culture and cytology,   abscess cx with Moderate Streptococcus agalactiae (Group B) isolated Group B   streptococci are susceptible to ampicillin, penicillin and cefazolin, noted above  ucx neg noted above  rept ct abd-pelv with Stable right obturator externus enhancing collection.  A second collection is is now apparent along the posterior aspect of the   pubic symphysis/left pubic body measuring up to 3.3 cm. previously this   region demonstrated some inflammatory phlegmonous change without definite   collection noted    as per discussion with IR pa, not enough fluid for drainage  id f/u   Continue Zosyn while inpatient  May change Zosyn to IV Ceftriaxone 2 g daily and Oral Flagyl 500 mg q 8 hours and continue until Radiological resolution of the Abscess,   ( Can prescribe Abx for 3 weeks, 4/9/24 and repeat CT scan in two weeks)   Monitor kidney function   OOb to chair   ID follow up with DR. Abhishek Gonzalez via Telehealth [phone] and [phone]  ir picc line for home abx infusion   surg f/u   no acute surgical intervention  gyn f/u   s/p chemo and radiation 2013/2014, s/p TANIA/BSO in 2015  new finding consistent with an infection  low suspicion for recurrent disease at this time   cytology report with GROIN, ABSCESS, RIGHT, NEGATIVE FOR MALIGNANT   CELLS noted above.   patient to follow up outpatient with her gyn-onc Dr. Blanco per patient preference  cont pain control   nonemergent hepatic protocol MRI to further right hepatic lobe density see on ct abd pelv.  phys tx eval noted and rec phys 2-3x/week; in hospital ~ 2 weeks  rolling walker; toileting 3:1 commode  Home PT;   Pain is current limiting factor in pt participation.   After anticipated decrease in pain during hospital course,   pt will see benefit from the skilled management of a HPT.  cont current meds   d/c plan pending picc and arrangement for home abx infusion             Patient is a 58y old  Female who presents with a chief complaint of R groin abscess (21 Mar 2024 15:07)    pt seen in icu [  ], reg med floor [ x  ], bed [ x ], chair at bedside [   ], a+o x3 [ x ], lethargic [  ],    nad [x  ]        Allergies    No Known Allergies        Vitals    T(F): 98.4 (03-22-24 @ 05:40), Max: 99 (03-21-24 @ 20:03)  HR: 73 (03-22-24 @ 05:40) (73 - 79)  BP: 109/72 (03-22-24 @ 05:40) (108/71 - 110/75)  RR: 18 (03-22-24 @ 05:40) (16 - 18)  SpO2: 97% (03-22-24 @ 05:40) (96% - 97%)  Wt(kg): --  CAPILLARY BLOOD GLUCOSE          Labs                          10.6   7.12  )-----------( 484      ( 21 Mar 2024 06:10 )             32.9       03-21    138  |  107  |  13  ----------------------------<  123<H>  3.6   |  26  |  0.88    Ca    9.3      21 Mar 2024 06:10              .Abscess right groin  03-11 @ 16:50   Moderate Streptococcus agalactiae (Group B) isolated  Group B streptococci are susceptible to ampicillin,  penicillin and cefazolin, but may be resistant to  erythromycin and clindamycin.  --    No polymorphonuclear leukocytes seen per low power field  Rare Gram positive cocci in pairs seen per oil power field      Clean Catch Clean Catch (Midstream)  03-10 @ 21:15   No growth  --  --          Radiology Results      Meds    MEDICATIONS  (STANDING):  cefTRIAXone   IVPB 2000 milliGRAM(s) IV Intermittent every 24 hours  chlorhexidine 2% Cloths 1 Application(s) Topical daily  metroNIDAZOLE    Tablet 500 milliGRAM(s) Oral every 8 hours  pantoprazole    Tablet 40 milliGRAM(s) Oral before breakfast      MEDICATIONS  (PRN):  acetaminophen     Tablet .. 650 milliGRAM(s) Oral every 6 hours PRN Temp greater or equal to 38C (100.4F), Mild Pain (1 - 3)  aluminum hydroxide/magnesium hydroxide/simethicone Suspension 30 milliLiter(s) Oral every 4 hours PRN Dyspepsia  melatonin 3 milliGRAM(s) Oral at bedtime PRN Insomnia  ondansetron Injectable 4 milliGRAM(s) IV Push every 8 hours PRN Nausea and/or Vomiting  sodium chloride 0.9% lock flush 10 milliLiter(s) IV Push every 1 hour PRN Pre/post blood products, medications, blood draw, and to maintain line patency      Physical Exam    Neuro :  no focal deficits  Respiratory: CTA B/L  CV: RRR, S1S2, no murmurs,   Abdominal: Soft, NT, ND +BS,  Extremities: No edema, + peripheral pulses,          ASSESSMENT    Right obturator externus muscle Abscess   cystitis  right hepatic density  Hx/o Stage IIB SCC Cervical Cancer s/p chemo/ radiation  2013/14.         PLAN    s/p  IR guided drainage of the Abscess with culture   1 cc of purulent fluid aspirated - sample sent for culture and cytology,   abscess cx with Moderate Streptococcus agalactiae (Group B) isolated Group B   streptococci are susceptible to ampicillin, penicillin and cefazolin, noted above  ucx neg noted above  rept ct abd-pelv with Stable right obturator externus enhancing collection.  A second collection is is now apparent along the posterior aspect of the   pubic symphysis/left pubic body measuring up to 3.3 cm. previously this   region demonstrated some inflammatory phlegmonous change without definite   collection noted    as per discussion with IR pa, not enough fluid for drainage  id f/u   abx changed to IV Ceftriaxone 2 g daily and Oral Flagyl 500 mg q 8 hours and continue until Radiological resolution of the Abscess,   ( Can prescribe Abx for 3 weeks, 4/9/24 and repeat CT scan in two weeks)   Monitor kidney function   OOb to chair   ID follow up with DR. Abhishek Gonzalez via Telehealth [phone] and [phone]  s/p ir placement of picc line in the left basilic vein for home abx infusion   surg f/u   no acute surgical intervention  gyn f/u   s/p chemo and radiation 2013/2014, s/p TANIA/BSO in 2015  new finding consistent with an infection  low suspicion for recurrent disease at this time   cytology report with GROIN, ABSCESS, RIGHT, NEGATIVE FOR MALIGNANT   CELLS noted above.   patient to follow up outpatient with her gyn-onc Dr. Blanco per patient preference  cont pain control   nonemergent hepatic protocol MRI to further right hepatic lobe density see on ct abd pelv.  phys tx eval noted and rec phys 2-3x/week; in hospital ~ 2 weeks  rolling walker; toileting 3:1 commode  Home PT;   Pain is current limiting factor in pt participation.   After anticipated decrease in pain during hospital course,   pt will see benefit from the skilled management of a HPT.  cont current meds   d/c plan for home abx infusion             Patient is a 58y old  Female who presents with a chief complaint of R groin abscess (21 Mar 2024 15:07)    pt seen in icu [  ], reg med floor [ x  ], bed [ x ], chair at bedside [   ], a+o x3 [ x ], lethargic [  ],    nad [x  ]        Allergies    No Known Allergies        Vitals    T(F): 98.4 (03-22-24 @ 05:40), Max: 99 (03-21-24 @ 20:03)  HR: 73 (03-22-24 @ 05:40) (73 - 79)  BP: 109/72 (03-22-24 @ 05:40) (108/71 - 110/75)  RR: 18 (03-22-24 @ 05:40) (16 - 18)  SpO2: 97% (03-22-24 @ 05:40) (96% - 97%)  Wt(kg): --  CAPILLARY BLOOD GLUCOSE          Labs                          10.6   7.12  )-----------( 484      ( 21 Mar 2024 06:10 )             32.9       03-21    138  |  107  |  13  ----------------------------<  123<H>  3.6   |  26  |  0.88    Ca    9.3      21 Mar 2024 06:10              .Abscess right groin  03-11 @ 16:50   Moderate Streptococcus agalactiae (Group B) isolated  Group B streptococci are susceptible to ampicillin,  penicillin and cefazolin, but may be resistant to  erythromycin and clindamycin.  --    No polymorphonuclear leukocytes seen per low power field  Rare Gram positive cocci in pairs seen per oil power field      Clean Catch Clean Catch (Midstream)  03-10 @ 21:15   No growth  --  --          Radiology Results      Meds    MEDICATIONS  (STANDING):  cefTRIAXone   IVPB 2000 milliGRAM(s) IV Intermittent every 24 hours  chlorhexidine 2% Cloths 1 Application(s) Topical daily  metroNIDAZOLE    Tablet 500 milliGRAM(s) Oral every 8 hours  pantoprazole    Tablet 40 milliGRAM(s) Oral before breakfast      MEDICATIONS  (PRN):  acetaminophen     Tablet .. 650 milliGRAM(s) Oral every 6 hours PRN Temp greater or equal to 38C (100.4F), Mild Pain (1 - 3)  aluminum hydroxide/magnesium hydroxide/simethicone Suspension 30 milliLiter(s) Oral every 4 hours PRN Dyspepsia  melatonin 3 milliGRAM(s) Oral at bedtime PRN Insomnia  ondansetron Injectable 4 milliGRAM(s) IV Push every 8 hours PRN Nausea and/or Vomiting  sodium chloride 0.9% lock flush 10 milliLiter(s) IV Push every 1 hour PRN Pre/post blood products, medications, blood draw, and to maintain line patency      Physical Exam    Neuro :  no focal deficits  Respiratory: CTA B/L  CV: RRR, S1S2, no murmurs,   Abdominal: Soft, NT, ND +BS,  Extremities: No edema, + peripheral pulses, lue picc         ASSESSMENT    Right obturator externus muscle Abscess   cystitis  right hepatic density  Hx/o Stage IIB SCC Cervical Cancer s/p chemo/ radiation  2013/14.         PLAN    s/p  IR guided drainage of the Abscess with culture   1 cc of purulent fluid aspirated - sample sent for culture and cytology,   abscess cx with Moderate Streptococcus agalactiae (Group B) isolated Group B   streptococci are susceptible to ampicillin, penicillin and cefazolin, noted above  ucx neg noted above  rept ct abd-pelv with Stable right obturator externus enhancing collection.  A second collection is is now apparent along the posterior aspect of the   pubic symphysis/left pubic body measuring up to 3.3 cm. previously this   region demonstrated some inflammatory phlegmonous change without definite   collection noted    as per discussion with IR pa, not enough fluid for drainage  id f/u   abx changed to IV Ceftriaxone 2 g daily and Oral Flagyl 500 mg q 8 hours and continue until Radiological resolution of the Abscess,   ( Can prescribe Abx for 3 weeks, 4/9/24 and repeat CT scan in two weeks)   Monitor kidney function   OOb to chair   ID follow up with DR. Abhishek Gonzalez via Telehealth [phone] and [phone]  s/p ir placement of picc line in the left basilic vein for home abx infusion   surg f/u   no acute surgical intervention  gyn f/u   s/p chemo and radiation 2013/2014, s/p TANIA/BSO in 2015  new finding consistent with an infection  low suspicion for recurrent disease at this time   cytology report with GROIN, ABSCESS, RIGHT, NEGATIVE FOR MALIGNANT   CELLS noted above.   patient to follow up outpatient with her gyn-onc Dr. Blanco per patient preference  cont pain control   nonemergent hepatic protocol MRI to further right hepatic lobe density see on ct abd pelv.  phys tx eval noted and rec phys 2-3x/week; in hospital ~ 2 weeks  rolling walker; toileting 3:1 commode  Home PT;   Pain is current limiting factor in pt participation.   After anticipated decrease in pain during hospital course,   pt will see benefit from the skilled management of a HPT.  cont current meds   d/c plan for home abx infusion

## 2024-03-22 NOTE — PROGRESS NOTE ADULT - PROVIDER SPECIALTY LIST ADULT
Infectious Disease
Internal Medicine
Infectious Disease
Internal Medicine
Surgery
Internal Medicine
Surgery
Surgery
Gyn Onc
Gyn Onc
Surgery
Surgery
Gyn Onc
Internal Medicine

## 2024-03-22 NOTE — PROGRESS NOTE ADULT - REASON FOR ADMISSION
R groin abscess

## 2024-03-22 NOTE — CHART NOTE - NSCHARTNOTEFT_GEN_A_CORE
Upon discharge, pt to follow up with ID Dr. Abhishek Gonzalez via Telehealth   Telephone#: [phone]  Fax #: [phone]
Based on patients on going deficits in Body Structures/Function Impairments, including Strength, Balance, & Pain, and generalized weakness secondary to patient diagnosis of  right obturator externus medially indicative of abscess. Pt requires extended length of IV abx treatment. Patient will require a bedside commode and rolling walker to achieve encourage and independence and facilitate iv infusion.
Patient was seen by ID last night and abscess culture came back with streptococcus. ID note 3/12 recommends to discontinue vancomycin and change cefepime to zosyn to add anaerobic coverage.   Would recommend:   1. Follow up final Abscess culture, is in process   2. Pain management as needed   3. Change cefepime to Zosyn and Discontinue IV Vancomycin    4. Monitor kidney function    Orders changed as recommended by ID.

## 2024-04-18 ENCOUNTER — OUTPATIENT (OUTPATIENT)
Dept: OUTPATIENT SERVICES | Facility: HOSPITAL | Age: 59
LOS: 1 days | End: 2024-04-18

## 2024-04-18 ENCOUNTER — APPOINTMENT (OUTPATIENT)
Dept: GYNECOLOGIC ONCOLOGY | Facility: CLINIC | Age: 59
End: 2024-04-18

## 2024-04-18 DIAGNOSIS — C53.9 MALIGNANT NEOPLASM OF CERVIX UTERI, UNSPECIFIED: ICD-10-CM

## 2024-04-19 DIAGNOSIS — C53.9 MALIGNANT NEOPLASM OF CERVIX UTERI, UNSPECIFIED: ICD-10-CM

## 2024-05-10 ENCOUNTER — RESULT REVIEW (OUTPATIENT)
Age: 59
End: 2024-05-10

## 2024-05-10 ENCOUNTER — OUTPATIENT (OUTPATIENT)
Dept: OUTPATIENT SERVICES | Facility: HOSPITAL | Age: 59
LOS: 1 days | End: 2024-05-10
Payer: MEDICAID

## 2024-05-10 DIAGNOSIS — R10.9 UNSPECIFIED ABDOMINAL PAIN: ICD-10-CM

## 2024-05-10 DIAGNOSIS — R10.2 PELVIC AND PERINEAL PAIN: ICD-10-CM

## 2024-05-10 DIAGNOSIS — Z00.8 ENCOUNTER FOR OTHER GENERAL EXAMINATION: ICD-10-CM

## 2024-05-10 PROCEDURE — 74177 CT ABD & PELVIS W/CONTRAST: CPT | Mod: 26

## 2024-05-10 PROCEDURE — 74177 CT ABD & PELVIS W/CONTRAST: CPT

## 2024-05-11 DIAGNOSIS — R10.2 PELVIC AND PERINEAL PAIN: ICD-10-CM

## 2024-05-11 DIAGNOSIS — R10.9 UNSPECIFIED ABDOMINAL PAIN: ICD-10-CM

## 2024-06-10 ENCOUNTER — OUTPATIENT (OUTPATIENT)
Dept: OUTPATIENT SERVICES | Facility: HOSPITAL | Age: 59
LOS: 1 days | Discharge: HOME | End: 2024-06-10
Payer: MEDICAID

## 2024-06-10 ENCOUNTER — RESULT REVIEW (OUTPATIENT)
Age: 59
End: 2024-06-10

## 2024-06-10 ENCOUNTER — TRANSCRIPTION ENCOUNTER (OUTPATIENT)
Age: 59
End: 2024-06-10

## 2024-06-10 DIAGNOSIS — K68.12 PSOAS MUSCLE ABSCESS: ICD-10-CM

## 2024-06-10 PROCEDURE — 36573 INSJ PICC RS&I 5 YR+: CPT

## 2024-06-10 PROCEDURE — C1751: CPT

## 2024-06-10 NOTE — ASU DISCHARGE PLAN (ADULT/PEDIATRIC) - ASU DC SPECIAL INSTRUCTIONSFT
left sided peripherally inserted central catheter placed  mydeco Power PICC placed   Tip in SVC, 37cm, ok for immediate use

## 2024-06-10 NOTE — ASU DISCHARGE PLAN (ADULT/PEDIATRIC) - NS MD DC FALL RISK RISK
For information on Fall & Injury Prevention, visit: [email or web address] OR  [email or web address] OR  https://www.cdc.gov/steadi/patient.html

## 2024-06-12 DIAGNOSIS — K68.12 PSOAS MUSCLE ABSCESS: ICD-10-CM

## 2024-06-15 DIAGNOSIS — Z45.2 ENCOUNTER FOR ADJUSTMENT AND MANAGEMENT OF VASCULAR ACCESS DEVICE: ICD-10-CM

## 2024-08-07 ENCOUNTER — NON-APPOINTMENT (OUTPATIENT)
Age: 59
End: 2024-08-07

## 2024-08-08 ENCOUNTER — APPOINTMENT (OUTPATIENT)
Dept: GYNECOLOGIC ONCOLOGY | Facility: CLINIC | Age: 59
End: 2024-08-08

## 2024-08-08 PROCEDURE — 99459 PELVIC EXAMINATION: CPT

## 2024-08-08 PROCEDURE — 99213 OFFICE O/P EST LOW 20 MIN: CPT

## 2024-08-14 NOTE — HISTORY OF PRESENT ILLNESS
[FreeTextEntry1] : Ms. Marrero has a h/o stage IIB cervical SCC (with bilateral pelvic node involvement on imaging at diagnosis)  and is a former patient of Dr. Larson. She was treated on the Outback trial and completed concurrent chemotherapy and pelvic RT/vaginal brachytherapy completed in 12/2013. She was randomized to receive additional systemic cytotoxic chemotherapy and completed this with Dr. Mandujano in 3/2014.  [facility], and per notes, there was no residual disease on the specimen.  SHe has been ELVIS since that time.   IMAGING: PET/CT 3/2016 - no recurrence. Decreased size and metabolism of small cervical nodes.  CT C/A/P 8/9/17: ELVIS (questionable mild posterior bladder wall thickening and post-tx changes of sigmoid colon.) PET was not approved.  1/23/19 PET/CT noted a left retroclavicular LN unchanged in size and demonstrating FDG activity (SUV 3.7), biopsy 2/6/19 was negative. 3/27/19 CT A/P: small b/l renal angiomyolipomas; 6mm bladder polypoid lesion with air bubbles in bladder. 5/10/24: CT A/P: ELVIS; resolving infectious process  She had an admission for a groin abscess in March 2024, benign cytology. She feels well and denies pelvic pain or any other associated signs or symptoms.  She has had spotting after intercourse which is longstanding and occasional scant bleeding with wiping after urination; however none of this in the past 6 months.  She does not use a dilator. She is followed by Urology, Dr. Sierra - CT identified a bladder lesion, she had office cystoscopy; radiation cystitis; she denies any hematuria since summer 2022.   HM: 12/26/19: PAP negative/HRHPV (-) 6/2019 PAP negative/HRHPV (-)/ vaginal biopsy benign. 12/20/18 PAP negative; HRHPV (-); vaginal biopsy c/w post-RT changes. 3/2018 PAP (-)/HRHPV (-) 6/12/2020:  PAP:  Negative for intraepithelial lesion or malignancy.  Negative HPV 6/17/21:  Vagina, Right:  Dilated and anastomosing vascular spaces in the subepithelial tissue, with hemorrhage and fibrin.  Negative for malignancy.   6/17/21:  Negative for intraepithelial lesion or malignancy.  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 Porter Camacho, Dr. Aletha Zamora in same office Med Onc: Dr. Mandujano Rad Onc: Dr. Resendiz Urologist: Dr. Rosa Sierra

## 2024-08-14 NOTE — ASSESSMENT
[FreeTextEntry1] : 60 y/o with stage IIB cervical SCC, s/p chemoradiation and hysterectomy/BSO, clinically without evidence of disease.

## 2024-08-14 NOTE — PHYSICAL EXAM
[Chaperone Present] : A chaperone was present in the examining room during all aspects of the physical examination [61093] : A chaperone was present during the pelvic exam. [Absent] : Adnexa(ae): Absent [Normal] : Recto-Vaginal Exam: Normal [Fully active, able to carry on all pre-disease performance without restriction] : Status 0 - Fully active, able to carry on all pre-disease performance without restriction [FreeTextEntry2] : Ara [de-identified] : vagina is shortened and stenotic c/w treatment effect, no lesions [de-identified] : no mass [de-identified] : confirmatory

## 2024-08-14 NOTE — ASSESSMENT
[FreeTextEntry1] : 58 y/o with stage IIB cervical SCC, s/p chemoradiation and hysterectomy/BSO, clinically without evidence of disease.

## 2024-08-14 NOTE — HISTORY OF PRESENT ILLNESS
[FreeTextEntry1] : Ms. Marrero has a h/o stage IIB cervical SCC (with bilateral pelvic node involvement on imaging at diagnosis)  and is a former patient of Dr. Larson. She was treated on the Outback trial and completed concurrent chemotherapy and pelvic RT/vaginal brachytherapy completed in 12/2013. She was randomized to receive additional systemic cytotoxic chemotherapy and completed this with Dr. Mandujano in 3/2014.  Subsequently, she underwent TANIA/BSO in [facility] in early 2015 due to bleeding, and per notes, there was no residual disease on the specimen.  SHe has been ELVIS since that time.   IMAGING: PET/CT 3/2016 - no recurrence. Decreased size and metabolism of small cervical nodes.  CT C/A/P 8/9/17: ELVIS (questionable mild posterior bladder wall thickening and post-tx changes of sigmoid colon.) PET was not approved.  1/23/19 PET/CT noted a left retroclavicular LN unchanged in size and demonstrating FDG activity (SUV 3.7), biopsy 2/6/19 was negative. 3/27/19 CT A/P: small b/l renal angiomyolipomas; 6mm bladder polypoid lesion with air bubbles in bladder. 5/10/24: CT A/P: ELVIS; resolving infectious process  She had an admission for a groin abscess in March 2024, benign cytology. She feels well and denies pelvic pain or any other associated signs or symptoms.  She has had spotting after intercourse which is longstanding and occasional scant bleeding with wiping after urination; however none of this in the past 6 months.  She does not use a dilator. She is followed by Urology, Dr. Sierra - CT identified a bladder lesion, she had office cystoscopy; radiation cystitis; she denies any hematuria since summer 2022.   HM: 12/26/19: PAP negative/HRHPV (-) 6/2019 PAP negative/HRHPV (-)/ vaginal biopsy benign. 12/20/18 PAP negative; HRHPV (-); vaginal biopsy c/w post-RT changes. 3/2018 PAP (-)/HRHPV (-) 6/12/2020:  PAP:  Negative for intraepithelial lesion or malignancy.  Negative HPV 6/17/21:  Vagina, Right:  Dilated and anastomosing vascular spaces in the subepithelial tissue, with hemorrhage and fibrin.  Negative for malignancy.   6/17/21:  Negative for intraepithelial lesion or malignancy.  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 Porter Camacho, Dr. Alehta Zamora in same office Med Onc: Dr. Mandujano Rad Onc: Dr. Resendiz Urologist: Dr. Rosa Sierra

## 2024-08-14 NOTE — DISCUSSION/SUMMARY
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 (addendum: PAP negative/HRHPV (-); Patient notified via MOA tasklist. LDS) -  reviewed - Risk, signs and symptoms of recurrence were reviewed.  -  All questions answered. She was advised to see me in 6 months for continued surveillance.

## 2024-10-03 NOTE — PROGRESS NOTE ADULT - PROBLEM SELECTOR PLAN 1
Problem: At Risk for Falls  Goal: Patient does not fall  Outcome: Monitoring/Evaluating progress  Note: PT/OT now on consult for mobility issues     Problem: Pain  Goal: Acceptable pain level achieved/maintained at rest using appropriate pain scale for the patient  Outcome: Not met, plan adjusted  Note: LLE pain continues to worsen. PRN medication management as well as non-pharmacologics.      Problem: Impaired Physical Mobility  Goal: Functional status is maintained or returned to baseline during hospitalization  Outcome: Not met, plan adjusted  Note: PT/OT new consult       CT shows a 5.3 cm complex collection in the right obturator externus medially indicative of abscess  s/p aspiration by IR on 3/11  wound Cx + for Moderate Streptococcus  cont zosyn  pending sensitivities  repeat CT scan shows 2nd collection along the posterior aspect of the left pubic body measuring up to 3.3 cm  IR consulted for drainage   ID Dr. Cline   Surgery following

## 2024-10-18 NOTE — ED ADULT NURSE NOTE - NSFALLRISK_ED_ALL_ED

## 2025-03-07 NOTE — PATIENT PROFILE ADULT - CHOOSE INDICATION TO IMMUNIZE (AN ORDER WILL BE GENERATED WHEN THIS NOTE IS SAVED):
TTE 2/12/25: EF 55%, Normal systolic dysfunction, G1DD, No vegetation, No mural thrombus, moderate aortic sclerosis     Plan:  Hold aspirin in setting of GI bleed   Patient is not pregnant (male or female)

## 2025-04-24 ENCOUNTER — NON-APPOINTMENT (OUTPATIENT)
Age: 60
End: 2025-04-24

## 2025-04-24 ENCOUNTER — APPOINTMENT (OUTPATIENT)
Dept: GYNECOLOGIC ONCOLOGY | Facility: CLINIC | Age: 60
End: 2025-04-24
Payer: COMMERCIAL

## 2025-04-24 VITALS
HEART RATE: 78 BPM | BODY MASS INDEX: 34.95 KG/M2 | TEMPERATURE: 98 F | SYSTOLIC BLOOD PRESSURE: 155 MMHG | WEIGHT: 162 LBS | DIASTOLIC BLOOD PRESSURE: 96 MMHG | HEIGHT: 57 IN | OXYGEN SATURATION: 96 %

## 2025-04-24 DIAGNOSIS — C53.9 MALIGNANT NEOPLASM OF CERVIX UTERI, UNSPECIFIED: ICD-10-CM

## 2025-04-24 PROCEDURE — 99213 OFFICE O/P EST LOW 20 MIN: CPT

## 2025-04-24 PROCEDURE — 99459 PELVIC EXAMINATION: CPT

## 2025-04-24 RX ORDER — UBIDECARENONE/VIT E ACET 100MG-5
CAPSULE ORAL
Refills: 0 | Status: ACTIVE | COMMUNITY

## 2025-04-24 RX ORDER — ASCORBIC ACID 500 MG
TABLET ORAL
Refills: 0 | Status: ACTIVE | COMMUNITY

## 2025-04-24 RX ORDER — VITAMIN E ACID SUCCINATE 268 MG
TABLET ORAL
Refills: 0 | Status: ACTIVE | COMMUNITY

## 2025-05-02 LAB
CYTOLOGY CVX/VAG DOC THIN PREP: ABNORMAL
HPV HIGH+LOW RISK DNA PNL CVX: NOT DETECTED